# Patient Record
Sex: FEMALE | Race: WHITE | NOT HISPANIC OR LATINO | Employment: FULL TIME | URBAN - METROPOLITAN AREA
[De-identification: names, ages, dates, MRNs, and addresses within clinical notes are randomized per-mention and may not be internally consistent; named-entity substitution may affect disease eponyms.]

---

## 2017-04-13 ENCOUNTER — ALLSCRIPTS OFFICE VISIT (OUTPATIENT)
Dept: OTHER | Facility: OTHER | Age: 62
End: 2017-04-13

## 2017-04-21 ENCOUNTER — ALLSCRIPTS OFFICE VISIT (OUTPATIENT)
Dept: OTHER | Facility: OTHER | Age: 62
End: 2017-04-21

## 2017-04-28 ENCOUNTER — GENERIC CONVERSION - ENCOUNTER (OUTPATIENT)
Dept: OTHER | Facility: OTHER | Age: 62
End: 2017-04-28

## 2017-04-28 LAB
A/G RATIO (HISTORICAL): 1.6 (ref 1.2–2.2)
ALBUMIN SERPL BCP-MCNC: 4 G/DL (ref 3.5–5.5)
ALP SERPL-CCNC: 84 IU/L (ref 39–117)
ALT SERPL W P-5'-P-CCNC: 23 IU/L (ref 0–32)
AST SERPL W P-5'-P-CCNC: 22 IU/L (ref 0–40)
BILIRUB SERPL-MCNC: 0.5 MG/DL (ref 0–1.2)
BUN SERPL-MCNC: 12 MG/DL (ref 6–24)
BUN/CREA RATIO (HISTORICAL): 18 (ref 9–23)
CALCIUM SERPL-MCNC: 9 MG/DL (ref 8.7–10.2)
CHLORIDE SERPL-SCNC: 102 MMOL/L (ref 96–106)
CHOLEST SERPL-MCNC: 164 MG/DL (ref 100–199)
CHOLEST/HDLC SERPL: 2.4 RATIO UNITS (ref 0–4.4)
CO2 SERPL-SCNC: 24 MMOL/L (ref 18–29)
CREAT SERPL-MCNC: 0.67 MG/DL (ref 0.57–1)
EGFR AFRICAN AMERICAN (HISTORICAL): 111 ML/MIN/1.73
EGFR-AMERICAN CALC (HISTORICAL): 97 ML/MIN/1.73
GLUCOSE SERPL-MCNC: 83 MG/DL (ref 65–99)
HDLC SERPL-MCNC: 67 MG/DL
LDLC SERPL CALC-MCNC: 85 MG/DL (ref 0–99)
POTASSIUM SERPL-SCNC: 4.5 MMOL/L (ref 3.5–5.2)
SODIUM SERPL-SCNC: 142 MMOL/L (ref 134–144)
TOT. GLOBULIN, SERUM (HISTORICAL): 2.5 G/DL (ref 1.5–4.5)
TOTAL PROTEIN (HISTORICAL): 6.5 G/DL (ref 6–8.5)
TRIGL SERPL-MCNC: 62 MG/DL (ref 0–149)
VLDLC SERPL CALC-MCNC: 12 MG/DL (ref 5–40)

## 2017-04-29 LAB — INTERPRETATION (HISTORICAL): NORMAL

## 2017-04-30 ENCOUNTER — GENERIC CONVERSION - ENCOUNTER (OUTPATIENT)
Dept: OTHER | Facility: OTHER | Age: 62
End: 2017-04-30

## 2017-05-10 ENCOUNTER — GENERIC CONVERSION - ENCOUNTER (OUTPATIENT)
Dept: OTHER | Facility: OTHER | Age: 62
End: 2017-05-10

## 2017-12-19 ENCOUNTER — ALLSCRIPTS OFFICE VISIT (OUTPATIENT)
Dept: OTHER | Facility: OTHER | Age: 62
End: 2017-12-19

## 2018-01-13 VITALS
WEIGHT: 145 LBS | SYSTOLIC BLOOD PRESSURE: 106 MMHG | BODY MASS INDEX: 24.75 KG/M2 | DIASTOLIC BLOOD PRESSURE: 60 MMHG | HEIGHT: 64 IN | TEMPERATURE: 98 F

## 2018-01-13 NOTE — RESULT NOTES
Verified Results  (1) ACUTE HEPATITIS PANEL [id number] 07:14AM [facility]     Test Name Result Flag Reference   Hep A Ab, IgM Negative  Negative   HBsAg Screen Negative  Negative   Hep B Core Ab, IgM Negative  Negative   Hep C Virus Ab 0 2 s/co ratio  0 0-0 9   Negative:     < 0 8                                              Indeterminate: 0 8 - 0 9                                                   Positive:     > 0 9                  The [facility] recommends that a positive HCV antibody result                  be followed up with a HCV Nucleic Acid Amplification                  test ([id number])  (1) COMPREHENSIVE METABOLIC PANEL [id number] 06:93LF [facility]     Test Name Result Flag Reference   Glucose, Serum 96 mg/dL  65-99   BUN 9 mg/dL  6-24   Creatinine, Serum 0 78 mg/dL  0 57-1 00   eGFR If NonAfricn Am 83 mL/min/1 73  >59   eGFR If Africn Am 96 mL/min/1 73  >59   BUN/Creatinine Ratio 12  9-23   Sodium, Serum 140 mmol/L  134-144   Potassium, Serum 4 3 mmol/L  3 5-5 2   Chloride, Serum 102 mmol/L     Carbon Dioxide, Total 22 mmol/L  18-29   Calcium, Serum 9 6 mg/dL  8 7-10 2   Protein, Total, Serum 6 6 g/dL  6 0-8 5   Albumin, Serum 4 3 g/dL  3 5-5 5   Globulin, Total 2 3 g/dL  1 5-4 5   A/G Ratio 1 9  1 1-2 5   Bilirubin, Total 0 5 mg/dL  0 0-1 2   Alkaline Phosphatase, S 91 IU/L     AST (SGOT) 25 IU/L  0-40   ALT (SGPT) 21 IU/L  0-32     (1) LIPID PANEL FASTING W DIRECT LDL REFLEX [id number] 07:14AM [facility]     Test Name Result Flag Reference   Cholesterol, Total 147 mg/dL  100-199   Triglycerides 79 mg/dL  0-149   HDL Cholesterol 54 mg/dL  >39   According to ATP-III Guidelines, HDL-C >59 mg/dL is considered a  negative risk factor for CHD     LDL Cholesterol Calc 77 mg/dL  0-99     (1) CBC/ PLT (NO DIFF) [id number] 07:14AM [facility]     Test Name Result Flag Reference   WBC 3 8 x10E3/uL  3 4-10 8   RBC 4 31 x10E6/uL  3 77-5 28   Hemoglobin 13 5 g/dL  11 1-15 9   Hematocrit 40 8 % 34 0-46  6   MCV 95 fL  79-97   MCH 31 3 pg  26 6-33 0   MCHC 33 1 g/dL  31 5-35 7   RDW 13 3 %  12 3-15 4   Platelets 625 B48Y6/TRUMAN  150-379     Dinora Reesex CMP14 Default [id number] 07:14AM [facility]     Test Name Result Flag Reference   Everjose Keto CMP14 Default Comment     A hand-written panel/profile was received from your office  In  accordance with the [facility] Ambiguous Test Code Policy dated July 9295, we have completed your order by using the closest currently  or formerly recognized AMA panel  We have assigned Comprehensive  Metabolic Panel (14), Test Code #121603 to this request   If this  is not the testing you wished to receive on this specimen, please  contact the [address] [place] [address] Client Inquiry/Technical Services Department  to clarify the test order  We appreciate your business         Discussion/Summary   your labs were great! follow up as planned

## 2018-01-14 VITALS
SYSTOLIC BLOOD PRESSURE: 124 MMHG | BODY MASS INDEX: 24.75 KG/M2 | HEART RATE: 66 BPM | TEMPERATURE: 98.2 F | DIASTOLIC BLOOD PRESSURE: 78 MMHG | WEIGHT: 145 LBS | HEIGHT: 64 IN | RESPIRATION RATE: 14 BRPM

## 2018-01-18 NOTE — RESULT NOTES
Discussion/Summary   labs in normal range  Continue medications     Verified Results  (1) COMPREHENSIVE METABOLIC PANEL [id number] 11:07BU [facility]     Test Name Result Flag Reference   Glucose, Serum 83 mg/dL  65-99   BUN 12 mg/dL  6-24   Creatinine, Serum 0 67 mg/dL  0 57-1 00   BUN/Creatinine Ratio 18  9-23   Sodium, Serum 142 mmol/L  134-144   Potassium, Serum 4 5 mmol/L  3 5-5 2   Chloride, Serum 102 mmol/L     Carbon Dioxide, Total 24 mmol/L  18-29   Calcium, Serum 9 0 mg/dL  8 7-10 2   Protein, Total, Serum 6 5 g/dL  6 0-8 5   Albumin, Serum 4 0 g/dL  3 5-5 5   Globulin, Total 2 5 g/dL  1 5-4 5   A/G Ratio 1 6  1 2-2 2   Bilirubin, Total 0 5 mg/dL  0 0-1 2   Alkaline Phosphatase, S 84 IU/L     AST (SGOT) 22 IU/L  0-40   ALT (SGPT) 23 IU/L  0-32   eGFR If NonAfricn Am 97 mL/min/1 73  >59   eGFR If Africn Am 111 mL/min/1 73  >59     (1) LIPID PANEL, FASTING [id number] 07:16AM [facility]     Test Name Result Flag Reference   Cholesterol, Total 164 mg/dL  100-199   Triglycerides 62 mg/dL  0-149   HDL Cholesterol 67 mg/dL  >39   VLDL Cholesterol Carlos 12 mg/dL  5-40   LDL Cholesterol Calc 85 mg/dL  0-99   T  Chol/HDL Ratio 2 4 ratio units  0 0-4 4   T  Chol/HDL Ratio                                                             Men  Women                                               1/2 Avg  Risk  3 4    3 3                                                   Avg Risk  5 0    4 4                                                2X Avg  Risk  9 6    7 1                                                3X Avg  Risk 23 4   11 0     [facility]) Cardiovascular Risk Assessment [id number] 07:16AM [facility]     Test Name Result Flag Reference   Interpretation Note     Supplement report is available  PDF Image

## 2018-01-23 NOTE — MISCELLANEOUS
Message  Return to work or school:   Claudell Fire is under my professional care  She was seen in my office on 12/19/2017        SUSANNE Mccoy        Signatures   Electronically signed by : Harrison Smalls; Dec 19 2017  2:36PM EST                       (Author)    Electronically signed by : Rodri Graf DO; Dec 19 2017  8:56PM EST                       (Author)

## 2018-01-24 VITALS
HEART RATE: 72 BPM | DIASTOLIC BLOOD PRESSURE: 68 MMHG | HEIGHT: 64 IN | RESPIRATION RATE: 16 BRPM | BODY MASS INDEX: 26.46 KG/M2 | SYSTOLIC BLOOD PRESSURE: 120 MMHG | WEIGHT: 155 LBS | TEMPERATURE: 96.8 F

## 2018-02-16 DIAGNOSIS — J32.9 CHRONIC SINUSITIS, UNSPECIFIED LOCATION: Primary | ICD-10-CM

## 2018-02-16 RX ORDER — CHLORAL HYDRATE 500 MG
CAPSULE ORAL
COMMUNITY
End: 2018-04-22

## 2018-02-16 RX ORDER — CETIRIZINE HYDROCHLORIDE 10 MG/1
10 TABLET ORAL
COMMUNITY
End: 2022-07-28

## 2018-02-16 RX ORDER — FLUTICASONE PROPIONATE 50 MCG
SPRAY, SUSPENSION (ML) NASAL
COMMUNITY
Start: 2016-05-07 | End: 2018-02-16 | Stop reason: SDUPTHER

## 2018-02-16 RX ORDER — ESTRADIOL 0.1 MG/G
CREAM VAGINAL
COMMUNITY
Start: 2017-12-19

## 2018-02-16 RX ORDER — BIOTIN 1 MG
TABLET ORAL DAILY
COMMUNITY

## 2018-02-16 RX ORDER — FLUTICASONE PROPIONATE 50 MCG
1 SPRAY, SUSPENSION (ML) NASAL DAILY
Qty: 16 G | Refills: 1 | Status: SHIPPED | OUTPATIENT
Start: 2018-02-16 | End: 2018-04-27 | Stop reason: SDUPTHER

## 2018-02-16 RX ORDER — ATORVASTATIN CALCIUM 10 MG/1
10 TABLET, FILM COATED ORAL
COMMUNITY
End: 2018-04-13 | Stop reason: SDUPTHER

## 2018-04-13 DIAGNOSIS — E78.2 MIXED HYPERLIPIDEMIA: Primary | ICD-10-CM

## 2018-04-13 RX ORDER — ATORVASTATIN CALCIUM 10 MG/1
10 TABLET, FILM COATED ORAL DAILY
Qty: 30 TABLET | Refills: 0 | Status: SHIPPED | OUTPATIENT
Start: 2018-04-13 | End: 2018-04-27 | Stop reason: SDUPTHER

## 2018-04-13 NOTE — TELEPHONE ENCOUNTER
Left message for pt to call back  She needs an appt and we need to confirm if she will see Dr Domenic Joe in [place]

## 2018-04-13 NOTE — TELEPHONE ENCOUNTER
Maira left a message on the hotline requesting that Dr Kanu Kincaid refill her Atorvastatin 10 mg to [facility]   We need to clarify with her that she plans to continue care with Dr Gail Mansfield

## 2018-04-13 NOTE — TELEPHONE ENCOUNTER
Confirmed with pt that she will be staying at [facility]  Appt made with Dr Rashida Adams 4/27/18  Rx confirmed  No further action needed

## 2018-04-22 PROBLEM — E88.1 LIPODYSTROPHY: Status: ACTIVE | Noted: 2017-05-12

## 2018-04-27 ENCOUNTER — OFFICE VISIT (OUTPATIENT)
Dept: FAMILY MEDICINE CLINIC | Facility: CLINIC | Age: 63
End: 2018-04-27
Payer: COMMERCIAL

## 2018-04-27 VITALS
TEMPERATURE: 97.7 F | HEART RATE: 82 BPM | RESPIRATION RATE: 16 BRPM | HEIGHT: 64 IN | DIASTOLIC BLOOD PRESSURE: 70 MMHG | SYSTOLIC BLOOD PRESSURE: 118 MMHG | BODY MASS INDEX: 27.83 KG/M2 | WEIGHT: 163 LBS

## 2018-04-27 DIAGNOSIS — J30.2 SEASONAL ALLERGIC RHINITIS, UNSPECIFIED TRIGGER: ICD-10-CM

## 2018-04-27 DIAGNOSIS — J32.9 CHRONIC SINUSITIS, UNSPECIFIED LOCATION: ICD-10-CM

## 2018-04-27 DIAGNOSIS — M81.0 OSTEOPOROSIS, UNSPECIFIED OSTEOPOROSIS TYPE, UNSPECIFIED PATHOLOGICAL FRACTURE PRESENCE: ICD-10-CM

## 2018-04-27 DIAGNOSIS — E78.2 MIXED HYPERLIPIDEMIA: Primary | ICD-10-CM

## 2018-04-27 PROCEDURE — 3008F BODY MASS INDEX DOCD: CPT | Performed by: FAMILY MEDICINE

## 2018-04-27 PROCEDURE — 99214 OFFICE O/P EST MOD 30 MIN: CPT | Performed by: FAMILY MEDICINE

## 2018-04-27 RX ORDER — ATORVASTATIN CALCIUM 10 MG/1
10 TABLET, FILM COATED ORAL DAILY
Qty: 90 TABLET | Refills: 1 | Status: SHIPPED | OUTPATIENT
Start: 2018-04-27 | End: 2018-11-08 | Stop reason: SDUPTHER

## 2018-04-27 RX ORDER — ALENDRONATE SODIUM 70 MG/1
70 TABLET ORAL WEEKLY
COMMUNITY
Start: 2018-03-09 | End: 2018-12-10 | Stop reason: SINTOL

## 2018-04-27 RX ORDER — FLUTICASONE PROPIONATE 50 MCG
1 SPRAY, SUSPENSION (ML) NASAL DAILY
Qty: 48 G | Refills: 1 | Status: SHIPPED | OUTPATIENT
Start: 2018-04-27 | End: 2019-10-04 | Stop reason: SDUPTHER

## 2018-04-27 NOTE — PATIENT INSTRUCTIONS
SHINGRIX is the new, more effective vaccine for Shingles  It is more than 90% effective  You should check with your local pharmacy and insurance company for availability and coverage  It is a 2 shot series, with the second shot given between 2-6 months after the first, and is approved for ages 48 and up

## 2018-04-27 NOTE — PROGRESS NOTES
Assessment/Plan:    No problem-specific Assessment & Plan notes found for this encounter  Allergies stable, cont flonase  Chol advised, importance of q6m f/u and labs  Use of statins for the purposes of CVD/CVA risks reduction was advised according to USPSTF guidelines along with appropriate continued liver monitoring  bmi advised and wt loss  Diet/exercise/weight loss is recommended  Keep rheum f/u  Osteoporosis new, tolerating fosamax, told to get dexa in 2y     Diagnoses and all orders for this visit:    Seasonal allergic rhinitis, unspecified trigger    Mixed hyperlipidemia  -     atorvastatin (LIPITOR) 10 mg tablet; Take 1 tablet (10 mg total) by mouth daily  -     Lipid Panel with Direct LDL reflex; Future    BMI 27 0-27 9,adult    Chronic sinusitis, unspecified location  -     fluticasone (FLONASE) 50 mcg/act nasal spray; 1 spray into each nostril daily    Osteoporosis, unspecified osteoporosis type, unspecified pathological fracture presence              Return in about 6 months (around 10/27/2018) for Recheck  Subjective:      Patient ID: Bairon Martinez is a 63 y o  female      Chief Complaint   Patient presents with    Follow-up     medications        HPI  Seeing rheumatologist, did labs, in [facility]  Also sees gyn  Had left leg pain  dexa showed osteoporosis, started on fosamax    Mid march had labs at [address] [facility] specialist and  normal cmp/tsh/D  On 1000u/d of vitamin D, was on 2000u and told to reduce      Takes lipitor  Not on low fat diet   Exercises  LFD and lab monitoring q6m advised    Allergies better on flonase, no nose bleeds  Zyrtec helps also  Spring and fall  Been congested  No f/c/discolored mucus    Quit tob 10y ago  Refuses CT lung screen      The following portions of the patient's history were reviewed and updated as appropriate: allergies, current medications, past family history, past medical history, past social history, past surgical history and problem list     Review of Systems Constitutional: Negative for fever  Respiratory: Negative for shortness of breath and wheezing  Musculoskeletal: Positive for arthralgias  Negative for myalgias  Current Outpatient Prescriptions   Medication Sig Dispense Refill    alendronate (FOSAMAX) 70 mg tablet Take 70 mg by mouth Once a week      atorvastatin (LIPITOR) 10 mg tablet Take 1 tablet (10 mg total) by mouth daily 90 tablet 1    cetirizine (ZyrTEC) 10 mg tablet Take 10 mg by mouth      Cholecalciferol (VITAMIN D3) 1000 units CAPS Take by mouth 2 (two) times a day      estradiol (ESTRACE VAGINAL) 0 1 mg/g vaginal cream Insert into the vagina      fluticasone (FLONASE) 50 mcg/act nasal spray 1 spray into each nostril daily 48 g 1    Omega-3 Fatty Acids (FISH OIL PO) Take 1,000 mg by mouth      CALCIUM CITRATE-VITAMIN D3 PO Take by mouth       No current facility-administered medications for this visit  Objective:    /70   Pulse 82   Temp 97 7 °F (36 5 °C)   Resp 16   Ht 5' 4" (1 626 m)   Wt 73 9 kg (163 lb)   BMI 27 98 kg/m²        Physical Exam   Constitutional: She appears well-developed  HENT:   Head: Normocephalic  Eyes: Conjunctivae are normal    Neck: Neck supple  Cardiovascular: Normal rate and intact distal pulses  Pulmonary/Chest: Effort normal  No respiratory distress  Abdominal: Soft  Musculoskeletal: She exhibits no edema or deformity  Neurological: She is alert  Skin: Skin is warm and dry  Psychiatric: Her behavior is normal  Thought content normal    Nursing note and vitals reviewed               Ebony Espino DO

## 2018-07-07 LAB
CHOLEST SERPL-MCNC: 185 MG/DL (ref 100–199)
HDLC SERPL-MCNC: 74 MG/DL
LDLC SERPL CALC-MCNC: 95 MG/DL (ref 0–99)
MICRODELETION SYND BLD/T FISH: NORMAL
TRIGL SERPL-MCNC: 79 MG/DL (ref 0–149)

## 2018-11-08 DIAGNOSIS — E78.2 MIXED HYPERLIPIDEMIA: ICD-10-CM

## 2018-11-08 RX ORDER — ATORVASTATIN CALCIUM 10 MG/1
10 TABLET, FILM COATED ORAL DAILY
Qty: 30 TABLET | Refills: 0 | Status: SHIPPED | OUTPATIENT
Start: 2018-11-08 | End: 2018-12-10 | Stop reason: SDUPTHER

## 2018-11-09 NOTE — TELEPHONE ENCOUNTER
Appointment made for 12/10/18 with Dr Courtney Govea   No further action is required  Milena Altman MA

## 2018-12-06 NOTE — PROGRESS NOTES
Subjective:      Patient ID: Lenard Allen is a 66 y o  female  Chief Complaint   Patient presents with    Follow-up     medication use-lj       She is here for follow up of hyperlipidemia and osteoporosis  Had DEXA by gyn showing osteoporosis  He sent her to rheum at [facility], who put her on fosamax and discharged her  She has been having problems with her teeth and was told by her dentist that she has to be off the fosamax for at least 3 months in order to treat her tooth, and that he did not think this medication is good for her  She stopped the fosamax and does not want anything else at this point for osteoporosis despite the risk of fracture  Is due for another DEXA in 2 years  Does exercise and gets adequate calcium and vitamin D daily  The following portions of the patient's history were reviewed and updated as appropriate: allergies, current medications, past family history, past medical history, past social history, past surgical history and problem list     Review of Systems   Constitutional: Negative  Respiratory: Negative  Cardiovascular: Negative  Current Outpatient Prescriptions   Medication Sig Dispense Refill    atorvastatin (LIPITOR) 10 mg tablet Take 1 tablet (10 mg total) by mouth daily 90 tablet 3    cetirizine (ZyrTEC) 10 mg tablet Take 10 mg by mouth      Cholecalciferol (VITAMIN D3) 1000 units CAPS Take by mouth 2 (two) times a day      estradiol (ESTRACE VAGINAL) 0 1 mg/g vaginal cream Insert into the vagina      fluticasone (FLONASE) 50 mcg/act nasal spray 1 spray into each nostril daily 48 g 1    Omega-3 Fatty Acids (FISH OIL PO) Take 1,000 mg by mouth       No current facility-administered medications for this visit  Objective:    /72   Pulse 68   Temp (!) 97 4 °F (36 3 °C)   Resp 16   Ht 5' 4" (1 626 m)   Wt 75 8 kg (167 lb)   Breastfeeding?  No   BMI 28 67 kg/m²        Physical Exam   Constitutional: She appears well-developed and well-nourished  Eyes: Conjunctivae are normal    Neck: Neck supple  No JVD present  No thyromegaly present  Cardiovascular: Normal rate, regular rhythm, normal heart sounds and intact distal pulses  Exam reveals no gallop and no friction rub  No murmur heard  Pulmonary/Chest: Effort normal and breath sounds normal  She has no wheezes  She has no rales  Abdominal: Soft  Bowel sounds are normal  She exhibits no distension  There is no tenderness  Musculoskeletal: She exhibits no edema  Assessment/Plan:    Osteoporosis  She will continue calcium and vitamin D and will recheck DEXA in 2 years  She refuses any alternate medication  Mixed hyperlipidemia  She is due for bloodwork on atorvastatin and this was added  Advised on low fat diet exercise, weight loss  BMI 28 0-28 9,adult  Advised again on low fat diet, exercise, weight loss  Diagnoses and all orders for this visit:    Osteoporosis, unspecified osteoporosis type, unspecified pathological fracture presence    Mixed hyperlipidemia  -     atorvastatin (LIPITOR) 10 mg tablet; Take 1 tablet (10 mg total) by mouth daily  -     CBC and differential; Future  -     Comprehensive metabolic panel; Future  -     Lipid panel; Future  -     CBC and differential  -     Comprehensive metabolic panel  -     Lipid panel    BMI 28 0-28 9,adult          Return in about 6 months (around 6/10/2019)         Goldy Baum MD

## 2018-12-10 ENCOUNTER — OFFICE VISIT (OUTPATIENT)
Dept: FAMILY MEDICINE CLINIC | Facility: CLINIC | Age: 63
End: 2018-12-10
Payer: COMMERCIAL

## 2018-12-10 VITALS
DIASTOLIC BLOOD PRESSURE: 72 MMHG | SYSTOLIC BLOOD PRESSURE: 124 MMHG | BODY MASS INDEX: 28.51 KG/M2 | HEIGHT: 64 IN | WEIGHT: 167 LBS | HEART RATE: 68 BPM | TEMPERATURE: 97.4 F | RESPIRATION RATE: 16 BRPM

## 2018-12-10 DIAGNOSIS — E78.2 MIXED HYPERLIPIDEMIA: ICD-10-CM

## 2018-12-10 DIAGNOSIS — M81.0 OSTEOPOROSIS, UNSPECIFIED OSTEOPOROSIS TYPE, UNSPECIFIED PATHOLOGICAL FRACTURE PRESENCE: Primary | ICD-10-CM

## 2018-12-10 PROCEDURE — 99213 OFFICE O/P EST LOW 20 MIN: CPT | Performed by: INTERNAL MEDICINE

## 2018-12-10 PROCEDURE — 3008F BODY MASS INDEX DOCD: CPT | Performed by: INTERNAL MEDICINE

## 2018-12-10 RX ORDER — ATORVASTATIN CALCIUM 10 MG/1
10 TABLET, FILM COATED ORAL DAILY
Qty: 90 TABLET | Refills: 3 | Status: SHIPPED | OUTPATIENT
Start: 2018-12-10 | End: 2018-12-19 | Stop reason: SDUPTHER

## 2018-12-10 NOTE — ASSESSMENT & PLAN NOTE
She will continue calcium and vitamin D and will recheck DEXA in 2 years  She refuses any alternate medication

## 2018-12-10 NOTE — ASSESSMENT & PLAN NOTE
She is due for bloodwork on atorvastatin and this was added  Advised on low fat diet exercise, weight loss

## 2018-12-19 DIAGNOSIS — E78.2 MIXED HYPERLIPIDEMIA: ICD-10-CM

## 2018-12-19 RX ORDER — ATORVASTATIN CALCIUM 10 MG/1
10 TABLET, FILM COATED ORAL DAILY
Qty: 90 TABLET | Refills: 3 | Status: SHIPPED | OUTPATIENT
Start: 2018-12-19 | End: 2019-06-10 | Stop reason: SDUPTHER

## 2019-01-02 ENCOUNTER — OFFICE VISIT (OUTPATIENT)
Dept: FAMILY MEDICINE CLINIC | Facility: CLINIC | Age: 64
End: 2019-01-02
Payer: COMMERCIAL

## 2019-01-02 VITALS
DIASTOLIC BLOOD PRESSURE: 82 MMHG | SYSTOLIC BLOOD PRESSURE: 130 MMHG | BODY MASS INDEX: 28.68 KG/M2 | RESPIRATION RATE: 16 BRPM | HEIGHT: 64 IN | TEMPERATURE: 97.4 F | HEART RATE: 84 BPM | WEIGHT: 168 LBS

## 2019-01-02 DIAGNOSIS — J06.9 VIRAL UPPER RESPIRATORY TRACT INFECTION: Primary | ICD-10-CM

## 2019-01-02 DIAGNOSIS — M77.12 LATERAL EPICONDYLITIS OF LEFT ELBOW: ICD-10-CM

## 2019-01-02 PROCEDURE — 3008F BODY MASS INDEX DOCD: CPT | Performed by: INTERNAL MEDICINE

## 2019-01-02 PROCEDURE — 99213 OFFICE O/P EST LOW 20 MIN: CPT | Performed by: INTERNAL MEDICINE

## 2019-01-02 PROCEDURE — 1036F TOBACCO NON-USER: CPT | Performed by: INTERNAL MEDICINE

## 2019-01-02 RX ORDER — DEXTROMETHORPHAN HYDROBROMIDE AND PROMETHAZINE HYDROCHLORIDE 15; 6.25 MG/5ML; MG/5ML
5 SYRUP ORAL 4 TIMES DAILY PRN
Qty: 118 ML | Refills: 0 | Status: SHIPPED | OUTPATIENT
Start: 2019-01-02 | End: 2019-06-10 | Stop reason: ALTCHOICE
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
## 2019-01-02 NOTE — LETTER
January 2, 2019     Patient: Tosin Esteves   YOB: 1955   Date of Visit: 1/2/2019       To Whom it May Concern:    Aris Bennett is under my professional care  She was seen in my office on 1/2/2019  She is excused due to illness 1/2 and 1/3/19  If you have any questions or concerns, please don't hesitate to call           Sincerely,          Reed Pinto MD        CC: No Recipients

## 2019-02-07 LAB
ALBUMIN SERPL-MCNC: 4.4 G/DL (ref 3.6–4.8)
ALBUMIN/GLOB SERPL: 1.8 {RATIO} (ref 1.2–2.2)
ALP SERPL-CCNC: 85 IU/L (ref 39–117)
ALT SERPL-CCNC: 20 IU/L (ref 0–32)
AST SERPL-CCNC: 20 IU/L (ref 0–40)
BASOPHILS # BLD AUTO: 0.1 X10E3/UL (ref 0–0.2)
BASOPHILS NFR BLD AUTO: 1 %
BILIRUB SERPL-MCNC: 0.3 MG/DL (ref 0–1.2)
BUN SERPL-MCNC: 16 MG/DL (ref 8–27)
BUN/CREAT SERPL: 20 (ref 12–28)
CALCIUM SERPL-MCNC: 9.5 MG/DL (ref 8.7–10.3)
CHLORIDE SERPL-SCNC: 104 MMOL/L (ref 96–106)
CHOLEST SERPL-MCNC: 167 MG/DL (ref 100–199)
CHOLEST/HDLC SERPL: 2.9 RATIO (ref 0–4.4)
CO2 SERPL-SCNC: 26 MMOL/L (ref 20–29)
CREAT SERPL-MCNC: 0.81 MG/DL (ref 0.57–1)
EOSINOPHIL # BLD AUTO: 0.5 X10E3/UL (ref 0–0.4)
EOSINOPHIL NFR BLD AUTO: 10 %
ERYTHROCYTE [DISTWIDTH] IN BLOOD BY AUTOMATED COUNT: 13.1 % (ref 12.3–15.4)
GLOBULIN SER-MCNC: 2.4 G/DL (ref 1.5–4.5)
GLUCOSE SERPL-MCNC: 99 MG/DL (ref 65–99)
HCT VFR BLD AUTO: 39 % (ref 34–46.6)
HDLC SERPL-MCNC: 58 MG/DL
HGB BLD-MCNC: 13.4 G/DL (ref 11.1–15.9)
IMM GRANULOCYTES # BLD: 0 X10E3/UL (ref 0–0.1)
IMM GRANULOCYTES NFR BLD: 0 %
LDLC SERPL CALC-MCNC: 92 MG/DL (ref 0–99)
LYMPHOCYTES # BLD AUTO: 1.6 X10E3/UL (ref 0.7–3.1)
LYMPHOCYTES NFR BLD AUTO: 32 %
MCH RBC QN AUTO: 32.4 PG (ref 26.6–33)
MCHC RBC AUTO-ENTMCNC: 34.4 G/DL (ref 31.5–35.7)
MCV RBC AUTO: 94 FL (ref 79–97)
MICRODELETION SYND BLD/T FISH: NORMAL
MONOCYTES # BLD AUTO: 0.5 X10E3/UL (ref 0.1–0.9)
MONOCYTES NFR BLD AUTO: 9 %
NEUTROPHILS # BLD AUTO: 2.4 X10E3/UL (ref 1.4–7)
NEUTROPHILS NFR BLD AUTO: 48 %
PLATELET # BLD AUTO: 230 X10E3/UL (ref 150–379)
POTASSIUM SERPL-SCNC: 4.5 MMOL/L (ref 3.5–5.2)
PROT SERPL-MCNC: 6.8 G/DL (ref 6–8.5)
RBC # BLD AUTO: 4.14 X10E6/UL (ref 3.77–5.28)
SL AMB EGFR AFRICAN AMERICAN: 91 ML/MIN/1.73
SL AMB EGFR NON AFRICAN AMERICAN: 79 ML/MIN/1.73
SL AMB VLDL CHOLESTEROL CALC: 17 MG/DL (ref 5–40)
SODIUM SERPL-SCNC: 143 MMOL/L (ref 134–144)
TRIGL SERPL-MCNC: 87 MG/DL (ref 0–149)
WBC # BLD AUTO: 5 X10E3/UL (ref 3.4–10.8)

## 2019-02-11 ENCOUNTER — TELEPHONE (OUTPATIENT)
Dept: FAMILY MEDICINE CLINIC | Facility: CLINIC | Age: 64
End: 2019-02-11

## 2019-02-11 NOTE — TELEPHONE ENCOUNTER
Patient aware    Thinks she may need more testing if level does not come down  Upset that count is off, "count should not be off" tried to explain this is a type of wbc and its not elevated much but said she should not chronically be sick with nasal congestion  Believes its from a infection  Encouraged patient to make appt if sick  Will only make appointment if continues 
Pt left a message on results hotline for lab work she had done last Friday  Pt states she was looking in [facility] and noticed she had an abnormal reading for EOS absolute  She wants to speak with Dr Hemal Cueva about this   [place], [place]
These are types of WBC's that can be elevated in allergy and the elevation is very mild 
Alert and oriented to person, place, time/situation. normal mood and affect. no apparent risk to self or others.

## 2019-06-10 ENCOUNTER — OFFICE VISIT (OUTPATIENT)
Dept: FAMILY MEDICINE CLINIC | Facility: CLINIC | Age: 64
End: 2019-06-10
Payer: COMMERCIAL

## 2019-06-10 VITALS
WEIGHT: 159 LBS | RESPIRATION RATE: 16 BRPM | DIASTOLIC BLOOD PRESSURE: 78 MMHG | SYSTOLIC BLOOD PRESSURE: 122 MMHG | HEART RATE: 76 BPM | BODY MASS INDEX: 27.14 KG/M2 | TEMPERATURE: 97.9 F | HEIGHT: 64 IN

## 2019-06-10 DIAGNOSIS — R19.7 DIARRHEA, UNSPECIFIED TYPE: ICD-10-CM

## 2019-06-10 DIAGNOSIS — E78.2 MIXED HYPERLIPIDEMIA: Primary | ICD-10-CM

## 2019-06-10 PROCEDURE — 3008F BODY MASS INDEX DOCD: CPT | Performed by: INTERNAL MEDICINE

## 2019-06-10 PROCEDURE — 99213 OFFICE O/P EST LOW 20 MIN: CPT | Performed by: INTERNAL MEDICINE

## 2019-06-10 PROCEDURE — 1036F TOBACCO NON-USER: CPT | Performed by: INTERNAL MEDICINE

## 2019-06-10 RX ORDER — ATORVASTATIN CALCIUM 10 MG/1
10 TABLET, FILM COATED ORAL DAILY
Qty: 90 TABLET | Refills: 3 | Status: SHIPPED | OUTPATIENT
Start: 2019-06-10 | End: 2019-12-12 | Stop reason: SDUPTHER

## 2019-06-10 RX ORDER — AMOXICILLIN 500 MG
1000 CAPSULE ORAL
COMMUNITY
Start: 2010-03-01 | End: 2019-06-10 | Stop reason: ALTCHOICE

## 2019-10-04 ENCOUNTER — OFFICE VISIT (OUTPATIENT)
Dept: FAMILY MEDICINE CLINIC | Facility: CLINIC | Age: 64
End: 2019-10-04
Payer: COMMERCIAL

## 2019-10-04 VITALS
HEART RATE: 72 BPM | HEIGHT: 64 IN | WEIGHT: 163 LBS | SYSTOLIC BLOOD PRESSURE: 110 MMHG | TEMPERATURE: 98.6 F | RESPIRATION RATE: 18 BRPM | DIASTOLIC BLOOD PRESSURE: 74 MMHG | BODY MASS INDEX: 27.83 KG/M2

## 2019-10-04 DIAGNOSIS — J32.9 CHRONIC SINUSITIS, UNSPECIFIED LOCATION: ICD-10-CM

## 2019-10-04 DIAGNOSIS — J06.9 UPPER RESPIRATORY TRACT INFECTION, UNSPECIFIED TYPE: Primary | ICD-10-CM

## 2019-10-04 PROCEDURE — 3008F BODY MASS INDEX DOCD: CPT | Performed by: FAMILY MEDICINE

## 2019-10-04 PROCEDURE — 99213 OFFICE O/P EST LOW 20 MIN: CPT | Performed by: FAMILY MEDICINE

## 2019-10-04 RX ORDER — AZITHROMYCIN 250 MG/1
TABLET, FILM COATED ORAL
Qty: 6 TABLET | Refills: 0 | Status: SHIPPED | OUTPATIENT
Start: 2019-10-04 | End: 2019-10-09

## 2019-10-04 RX ORDER — FLUTICASONE PROPIONATE 50 MCG
1 SPRAY, SUSPENSION (ML) NASAL DAILY
Qty: 48 G | Refills: 1 | Status: SHIPPED | OUTPATIENT
Start: 2019-10-04 | End: 2020-08-03 | Stop reason: SDUPTHER

## 2019-10-04 NOTE — PROGRESS NOTES
Assessment/Plan:     Diagnoses and all orders for this visit:    Upper respiratory tract infection, unspecified type  -     azithromycin (ZITHROMAX) 250 mg tablet; Take 2 tablets (500 mg total) by mouth daily for 1 day, THEN 1 tablet (250 mg total) daily for 4 days if symptoms worsen or do not improve  -     fluticasone (FLONASE) 50 mcg/act nasal spray; 1 spray into each nostril daily  - Reviewed supportive care with patient including rest, staying hydrated (drink 8-10 glasses of liquids such as water, ginger ale, juice), salt water gargles, saline nasal drops, use of humidifier, frequent handwashing to prevent spread of germs, and use of over the counter decongestants, cough suppressants, Ibuprofen/Acetaminophen        Subjective:      Patient ID: Liliam Earl is a 60 y o  female  URI    This is a new problem  The current episode started in the past 7 days  The problem has been unchanged  There has been no fever  Associated symptoms include congestion, ear pain, a plugged ear sensation, sinus pain, a sore throat and swollen glands  Pertinent negatives include no abdominal pain, chest pain, coughing, diarrhea, dysuria, headaches, joint pain, joint swelling, nausea, neck pain, rash, rhinorrhea, sneezing, vomiting or wheezing  She has tried increased fluids for the symptoms  The treatment provided mild relief  The following portions of the patient's history were reviewed and updated as appropriate: allergies, current medications, past family history, past medical history, past social history, past surgical history and problem list     Review of Systems   HENT: Positive for congestion, ear pain, sinus pain and sore throat  Negative for rhinorrhea and sneezing  Respiratory: Negative for cough and wheezing  Cardiovascular: Negative for chest pain  Gastrointestinal: Negative for abdominal pain, diarrhea, nausea and vomiting  Genitourinary: Negative for dysuria     Musculoskeletal: Negative for joint pain and neck pain  Skin: Negative for rash  Neurological: Negative for headaches  Objective:      /74   Pulse 72   Temp 98 6 °F (37 °C)   Resp 18   Ht 5' 4" (1 626 m)   Wt 73 9 kg (163 lb)   BMI 27 98 kg/m²          Physical Exam   Constitutional: She is oriented to person, place, and time  She appears well-developed and well-nourished  No distress  HENT:   Head: Normocephalic and atraumatic  Right Ear: External ear normal    Left Ear: External ear normal    Nose: Nose normal    Mouth/Throat: Oropharynx is clear and moist  No oropharyngeal exudate  Eyes: Pupils are equal, round, and reactive to light  Conjunctivae and EOM are normal  Right eye exhibits no discharge  Left eye exhibits no discharge  No scleral icterus  Neck: Normal range of motion  Neck supple  No JVD present  No tracheal deviation present  No thyromegaly present  Cardiovascular: Normal rate, regular rhythm, normal heart sounds and intact distal pulses  Exam reveals no gallop and no friction rub  No murmur heard  Pulmonary/Chest: Effort normal and breath sounds normal  No respiratory distress  She has no wheezes  She has no rales  She exhibits no tenderness  Musculoskeletal: Normal range of motion  She exhibits no edema or deformity  Neurological: She is alert and oriented to person, place, and time  Skin: Skin is warm and dry  She is not diaphoretic  Psychiatric: She has a normal mood and affect   Her behavior is normal  Judgment and thought content normal

## 2019-11-27 LAB
ALBUMIN SERPL-MCNC: 4.4 G/DL (ref 3.6–4.8)
ALBUMIN/GLOB SERPL: 1.8 {RATIO} (ref 1.2–2.2)
ALP SERPL-CCNC: 89 IU/L (ref 39–117)
ALT SERPL-CCNC: 29 IU/L (ref 0–32)
AST SERPL-CCNC: 25 IU/L (ref 0–40)
BILIRUB SERPL-MCNC: 0.6 MG/DL (ref 0–1.2)
BUN SERPL-MCNC: 13 MG/DL (ref 8–27)
BUN/CREAT SERPL: 16 (ref 12–28)
CALCIUM SERPL-MCNC: 9.4 MG/DL (ref 8.7–10.3)
CHLORIDE SERPL-SCNC: 102 MMOL/L (ref 96–106)
CHOLEST SERPL-MCNC: 189 MG/DL (ref 100–199)
CHOLEST/HDLC SERPL: 2.7 RATIO (ref 0–4.4)
CO2 SERPL-SCNC: 24 MMOL/L (ref 20–29)
CREAT SERPL-MCNC: 0.79 MG/DL (ref 0.57–1)
ENDOMYSIUM IGA SER QL: NEGATIVE
GLOBULIN SER-MCNC: 2.4 G/DL (ref 1.5–4.5)
GLUCOSE SERPL-MCNC: 96 MG/DL (ref 65–99)
HDLC SERPL-MCNC: 69 MG/DL
IGA SERPL-MCNC: 176 MG/DL (ref 87–352)
LDLC SERPL CALC-MCNC: 97 MG/DL (ref 0–99)
MICRODELETION SYND BLD/T FISH: NORMAL
POTASSIUM SERPL-SCNC: 4.3 MMOL/L (ref 3.5–5.2)
PROT SERPL-MCNC: 6.8 G/DL (ref 6–8.5)
SL AMB EGFR AFRICAN AMERICAN: 93 ML/MIN/1.73
SL AMB EGFR NON AFRICAN AMERICAN: 80 ML/MIN/1.73
SL AMB VLDL CHOLESTEROL CALC: 23 MG/DL (ref 5–40)
SODIUM SERPL-SCNC: 140 MMOL/L (ref 134–144)
TRIGL SERPL-MCNC: 115 MG/DL (ref 0–149)
TTG IGA SER-ACNC: <2 U/ML (ref 0–3)

## 2019-12-09 NOTE — PROGRESS NOTES
Assessment/Plan:    1  Mixed hyperlipidemia  Assessment & Plan: This is well controlled on atorvastatin, reviewed labs with patient  Continue low fat diet  Orders:  -     atorvastatin (LIPITOR) 10 mg tablet; Take 1 tablet (10 mg total) by mouth daily  -     Comprehensive metabolic panel; Future  -     Lipid panel; Future  -     influenza vaccine, [id number], quadrivalent, recombinant, PF, 0 5 mL, for patients 18 yr+ (FLUBLOK)  -     Comprehensive metabolic panel  -     Lipid panel    2  Nevus  Comments:  Referred to dermatology for a skin check  Orders:  -     Ambulatory referral to Dermatology; Future    3  Need for vaccination  -     influenza vaccine, [id number], quadrivalent, recombinant, PF, 0 5 mL, for patients 18 yr+ (FLUBLOK)          There are no Patient Instructions on file for this visit  Return in about 6 months (around 6/12/2020)  Subjective:      Patient ID: Magen Olsen is a 60 y o  female  Chief Complaint   Patient presents with    Follow-up     6 month f/u-wmcma       Here for follow up of hyperlipidemia  Feels well and has no side effects on the atorvastatin  Trying to follow a low fat diet  She has a mole that she is a little concerned about  It has been there for years and has not changed, but would like it checked  The following portions of the patient's history were reviewed and updated as appropriate: allergies, current medications, past family history, past medical history, past social history, past surgical history and problem list     Review of Systems   Constitutional: Negative  Respiratory: Negative  Cardiovascular: Negative  Skin:        As per HPI           Current Outpatient Medications   Medication Sig Dispense Refill    Ascorbic Acid (VITAMIN C) 100 MG tablet Take 100 mg by mouth daily      atorvastatin (LIPITOR) 10 mg tablet Take 1 tablet (10 mg total) by mouth daily 90 tablet 3    cetirizine (ZyrTEC) 10 mg tablet Take 10 mg by mouth      Cholecalciferol (VITAMIN D3) 1000 units CAPS Take by mouth 2 (two) times a day      estradiol (ESTRACE VAGINAL) 0 1 mg/g vaginal cream Insert into the vagina      fluticasone (FLONASE) 50 mcg/act nasal spray 1 spray into each nostril daily 48 g 1    Multiple Vitamins-Iron (MULTIVITAMIN/IRON PO) one daily      Omega-3 Fatty Acids (FISH OIL PO) Take 1,000 mg by mouth       No current facility-administered medications for this visit  Objective:    /80   Pulse 66   Temp (!) 96 7 °F (35 9 °C)   Resp 16   Ht 5' 4" (1 626 m)   Wt 74 4 kg (164 lb)   SpO2 96%   BMI 28 15 kg/m²        Physical Exam   Constitutional: She appears well-developed and well-nourished  Eyes: Conjunctivae are normal    Neck: Neck supple  No JVD present  No thyromegaly present  Cardiovascular: Normal rate, regular rhythm, normal heart sounds and intact distal pulses  Exam reveals no gallop and no friction rub  No murmur heard  Pulmonary/Chest: Effort normal and breath sounds normal  She has no wheezes  She has no rales  Abdominal: Soft  Bowel sounds are normal  She exhibits no distension  There is no tenderness  Musculoskeletal: She exhibits no edema     Skin:   Benign appearing nevus R upper chest              Poonam Burton MD

## 2019-12-12 ENCOUNTER — OFFICE VISIT (OUTPATIENT)
Dept: FAMILY MEDICINE CLINIC | Facility: CLINIC | Age: 64
End: 2019-12-12
Payer: COMMERCIAL

## 2019-12-12 VITALS
TEMPERATURE: 96.7 F | WEIGHT: 164 LBS | RESPIRATION RATE: 16 BRPM | HEART RATE: 66 BPM | OXYGEN SATURATION: 96 % | HEIGHT: 64 IN | SYSTOLIC BLOOD PRESSURE: 120 MMHG | BODY MASS INDEX: 28 KG/M2 | DIASTOLIC BLOOD PRESSURE: 80 MMHG

## 2019-12-12 DIAGNOSIS — D22.9 NEVUS: ICD-10-CM

## 2019-12-12 DIAGNOSIS — Z23 NEED FOR VACCINATION: ICD-10-CM

## 2019-12-12 DIAGNOSIS — E78.2 MIXED HYPERLIPIDEMIA: Primary | ICD-10-CM

## 2019-12-12 PROCEDURE — 90471 IMMUNIZATION ADMIN: CPT

## 2019-12-12 PROCEDURE — 90682 RIV4 VACC RECOMBINANT DNA IM: CPT

## 2019-12-12 PROCEDURE — 1036F TOBACCO NON-USER: CPT

## 2019-12-12 PROCEDURE — 99213 OFFICE O/P EST LOW 20 MIN: CPT

## 2019-12-12 PROCEDURE — 3008F BODY MASS INDEX DOCD: CPT

## 2019-12-12 RX ORDER — RIBOFLAVIN (VITAMIN B2) 100 MG
100 TABLET ORAL DAILY
COMMUNITY

## 2019-12-12 RX ORDER — ATORVASTATIN CALCIUM 10 MG/1
10 TABLET, FILM COATED ORAL DAILY
Qty: 90 TABLET | Refills: 3 | Status: SHIPPED | OUTPATIENT
Start: 2019-12-12 | End: 2020-09-19

## 2020-04-01 ENCOUNTER — TELEMEDICINE (OUTPATIENT)
Dept: FAMILY MEDICINE CLINIC | Facility: CLINIC | Age: 65
End: 2020-04-01
Payer: COMMERCIAL

## 2020-04-01 DIAGNOSIS — Z20.822 EXPOSURE TO COVID-19 VIRUS: Primary | ICD-10-CM

## 2020-04-01 PROBLEM — N39.3 STRESS INCONTINENCE IN FEMALE: Status: ACTIVE | Noted: 2020-02-22

## 2020-04-01 PROCEDURE — 99213 OFFICE O/P EST LOW 20 MIN: CPT | Performed by: INTERNAL MEDICINE

## 2020-08-03 ENCOUNTER — TELEMEDICINE (OUTPATIENT)
Dept: FAMILY MEDICINE CLINIC | Facility: CLINIC | Age: 65
End: 2020-08-03
Payer: COMMERCIAL

## 2020-08-03 DIAGNOSIS — J32.9 CHRONIC SINUSITIS, UNSPECIFIED LOCATION: ICD-10-CM

## 2020-08-03 DIAGNOSIS — Z20.828 EXPOSURE TO SARS-ASSOCIATED CORONAVIRUS: ICD-10-CM

## 2020-08-03 DIAGNOSIS — R19.7 DIARRHEA, UNSPECIFIED TYPE: Primary | ICD-10-CM

## 2020-08-03 DIAGNOSIS — J02.9 SORE THROAT: ICD-10-CM

## 2020-08-03 PROCEDURE — U0003 INFECTIOUS AGENT DETECTION BY NUCLEIC ACID (DNA OR RNA); SEVERE ACUTE RESPIRATORY SYNDROME CORONAVIRUS 2 (SARS-COV-2) (CORONAVIRUS DISEASE [COVID-19]), AMPLIFIED PROBE TECHNIQUE, MAKING USE OF HIGH THROUGHPUT TECHNOLOGIES AS DESCRIBED BY CMS-2020-01-R: HCPCS | Performed by: NURSE PRACTITIONER

## 2020-08-03 PROCEDURE — 1036F TOBACCO NON-USER: CPT | Performed by: NURSE PRACTITIONER

## 2020-08-03 PROCEDURE — 99213 OFFICE O/P EST LOW 20 MIN: CPT | Performed by: NURSE PRACTITIONER

## 2020-08-03 RX ORDER — FLUTICASONE PROPIONATE 50 MCG
1 SPRAY, SUSPENSION (ML) NASAL DAILY
Qty: 48 G | Refills: 1 | Status: SHIPPED | OUTPATIENT
Start: 2020-08-03 | End: 2021-06-09

## 2020-08-03 NOTE — PROGRESS NOTES
Virtual Regular Visit      Assessment/Plan:    Problem List Items Addressed This Visit        Respiratory    Chronic sinusitis    Relevant Medications    fluticasone (FLONASE) 50 mcg/act nasal spray      Other Visit Diagnoses     Diarrhea, unspecified type    -  Primary    Sore throat        Exposure to SARS-associated coronavirus        Relevant Orders    Novel Coronavirus (COVID-19), PCR [facility] - Collected at [facility] or [facility]        Afrin nasal spray for 3 days followed by fluticasone (Flonase) 2 sprays in each nostril daily  Supportive care discussed and advised  Advised to RTO for any worsening and no improvement  Follow up for no improvement and worsening of conditions  Patient advised and educated when to see immediate medical care  Increase fluid intake, saline nasal rinses, and hot tea with honey and lemon  Cool air humidification can be helpful as well  May take Tylenol as needed for pain or fevers  Mucinex D for sinus congestion or Coricidin HBP if you have high blood pressure or a heart condition  Mucinex or Robitussin DM are effective for cough and chest congestion  Advise to avoid use of NSAIDs like ibuprofen, advil, motrin, alleve, mobic  Eat low residue diet with food low in fiber and follow bland diet  Avoid Caffeine for 2 weeks  Avoid dairy products  Increase your fluid intake  Keep a record of the number of times you urinate during the day  You may slowly resume your normal level of activity once you feel better  Pepto Bismol and kaopectate as needed  Do not take any imodium for diarrhea  Follow up for no improvement and worsening of symptoms and for fever, localized and severe abdominal pain, recurrent nausea, vomiting, and diarrhea      If no improvement in 48 to 72 hours, will do COVID-19 testing             Reason for visit is   Chief Complaint   Patient presents with    Virtual Regular Visit        Encounter provider LEOPOLDO Terry    Provider located at  [address]  [address]  [address] [facility] [address]  [place] [address]      Recent Visits  No visits were found meeting these conditions  Showing recent visits within past 7 days and meeting all other requirements     Today's Visits  Date Type Provider Dept   08/03/20 Telemedicine Francis Wallis today's visits and meeting all other requirements     Future Appointments  No visits were found meeting these conditions  Showing future appointments within next 150 days and meeting all other requirements        The patient was identified by name and date of birth  Kori Kristan was informed that this is a telemedicine visit and that the visit is being conducted through [employer] [address] [employer] and patient was informed that this is not a secure, HIPAA-complaint platform  She agrees to proceed     My office door was closed  No one else was in the room  She acknowledged consent and understanding of privacy and security of the video platform  The patient has agreed to participate and understands they can discontinue the visit at any time  Patient is aware this is a billable service  Rudolph Hernandez is a 63 y o  female    HPI   Patient stated that went for camping last week in [place]  Started with congestion and sore throat last week and  having chills too  Denies fever, sob, chest pain, loss of sense of smell and taste  Stated that had diarrhea one day last week and today again  Denies abdominal pain, nausea and vomiting  Taking mucinex and sudafed for congestion  Not taking anything for diarrhea  Stated that does have chronic sinus issues too         Past Medical History:   Diagnosis Date    Anxiety     Cellulitis     Chronic rhinitis     Last Assessed:6/6/2016    GERD without esophagitis     Last Assessed:6/6/2016    Hyperlipidemia     Hypometabolism     Vitamin D deficiency        Past Surgical History:   Procedure Laterality Date    COLONOSCOPY         Current Outpatient Medications   Medication Sig Dispense Refill    Ascorbic Acid (VITAMIN C) 100 MG tablet Take 100 mg by mouth daily      atorvastatin (LIPITOR) 10 mg tablet Take 1 tablet (10 mg total) by mouth daily 90 tablet 3    cetirizine (ZyrTEC) 10 mg tablet Take 10 mg by mouth      Cholecalciferol (VITAMIN D3) 1000 units CAPS Take by mouth 2 (two) times a day      estradiol (ESTRACE VAGINAL) 0 1 mg/g vaginal cream Insert into the vagina      fluticasone (FLONASE) 50 mcg/act nasal spray 1 spray into each nostril daily 48 g 1    Multiple Vitamins-Iron (MULTIVITAMIN/IRON PO) one daily      Omega-3 Fatty Acids (FISH OIL PO) Take 1,000 mg by mouth       No current facility-administered medications for this visit  Allergies   Allergen Reactions    Monascus Purpureus Went Yeast Other (See Comments)    Sulfa Antibiotics Edema    Penicillins Rash       Review of Systems   Constitutional: Positive for chills  Negative for diaphoresis, fatigue, fever and unexpected weight change  HENT: Positive for congestion and sore throat  Negative for dental problem, drooling, ear discharge, ear pain, facial swelling, hearing loss, mouth sores, nosebleeds, postnasal drip, rhinorrhea, sinus pressure, sinus pain, sneezing, tinnitus, trouble swallowing and voice change  Respiratory: Negative for cough, chest tightness, shortness of breath and wheezing  Cardiovascular: Negative  Gastrointestinal: Positive for diarrhea  Negative for abdominal pain, constipation, nausea and vomiting  Musculoskeletal: Negative  Skin: Negative  Neurological: Negative for dizziness, weakness, light-headedness and headaches  Hematological: Negative  Video Exam    There were no vitals filed for this visit  Physical Exam   Constitutional: She is oriented to person, place, and time  She appears well-developed     HENT:   Nose: Nose normal    Eyes: Conjunctivae are normal    Neck: Normal range of motion  Pulmonary/Chest: Effort normal    Neurological: She is alert and oriented to person, place, and time  Skin:   No diaphoresis noted on video call   Psychiatric: Her behavior is normal  Judgment and thought content normal         I spent 10 minutes directly with the patient during this visit      [address] acknowledges that she has consented to an online visit or consultation  She understands that the online visit is based solely on information provided by her, and that, in the absence of a face-to-face physical evaluation by the physician, the diagnosis she receives is both limited and provisional in terms of accuracy and completeness  This is not intended to replace a full medical face-to-face evaluation by the physician  Renae Miranda understands and accepts these terms

## 2020-08-03 NOTE — LETTER
August 3, 2020     Patient: Eneida Webber   YOB: 1955   Date of Visit: 8/3/2020       To Whom it May Concern:    Genevieve Frankyjazmyn is under my professional care  She was seen in my office on 8/3/2020  She may return to work on 08/05/2020  If you have any questions or concerns, please don't hesitate to call           Sincerely,          LEOPOLDO Carmona        CC: No Recipients

## 2020-08-04 ENCOUNTER — TELEPHONE (OUTPATIENT)
Dept: FAMILY MEDICINE CLINIC | Facility: CLINIC | Age: 65
End: 2020-08-04

## 2020-08-04 LAB — SARS-COV-2 RNA SPEC QL NAA+PROBE: NOT DETECTED

## 2020-08-04 NOTE — LETTER
August 4, 2020     Patient: Erin Castro   YOB: 1955   Date of Visit: 8/4/2020       To Whom it May Concern:    Jenaro Yan is under my professional care  She may return to work on 08/6/2020  If you have any questions or concerns, please don't hesitate to call           Sincerely,          Jose Coley MA        CC: No Recipients

## 2020-08-04 NOTE — TELEPHONE ENCOUNTER
Patient aware, she is requesting a note to return to work on 08/06/20(not 08/05/20) as she is still feeling sick   Please advise Carisa Howard MA

## 2020-08-04 NOTE — TELEPHONE ENCOUNTER
----- Message from Pikk 20 sent at 8/4/2020  3:17 PM EDT -----  Please let patient know that her COVID-19 test is negative   LEOPOLDO Espinoza

## 2020-09-17 ENCOUNTER — TELEPHONE (OUTPATIENT)
Dept: FAMILY MEDICINE CLINIC | Facility: CLINIC | Age: 65
End: 2020-09-17

## 2020-09-17 ENCOUNTER — OFFICE VISIT (OUTPATIENT)
Dept: FAMILY MEDICINE CLINIC | Facility: CLINIC | Age: 65
End: 2020-09-17
Payer: COMMERCIAL

## 2020-09-17 VITALS
RESPIRATION RATE: 16 BRPM | WEIGHT: 169 LBS | BODY MASS INDEX: 28.85 KG/M2 | HEIGHT: 64 IN | TEMPERATURE: 97 F | DIASTOLIC BLOOD PRESSURE: 68 MMHG | SYSTOLIC BLOOD PRESSURE: 126 MMHG | HEART RATE: 64 BPM

## 2020-09-17 DIAGNOSIS — R10.2 SUPRAPUBIC PRESSURE: Primary | ICD-10-CM

## 2020-09-17 DIAGNOSIS — N39.3 STRESS INCONTINENCE: ICD-10-CM

## 2020-09-17 LAB
SL AMB  POCT GLUCOSE, UA: ABNORMAL
SL AMB LEUKOCYTE ESTERASE,UA: ABNORMAL
SL AMB POCT BILIRUBIN,UA: ABNORMAL
SL AMB POCT BLOOD,UA: ABNORMAL
SL AMB POCT CLARITY,UA: ABNORMAL
SL AMB POCT COLOR,UA: YELLOW
SL AMB POCT KETONES,UA: ABNORMAL
SL AMB POCT NITRITE,UA: ABNORMAL
SL AMB POCT PH,UA: 7
SL AMB POCT SPECIFIC GRAVITY,UA: 1.01
SL AMB POCT URINE PROTEIN: ABNORMAL
SL AMB POCT UROBILINOGEN: 0.2

## 2020-09-17 PROCEDURE — 99213 OFFICE O/P EST LOW 20 MIN: CPT | Performed by: NURSE PRACTITIONER

## 2020-09-17 PROCEDURE — 3725F SCREEN DEPRESSION PERFORMED: CPT | Performed by: NURSE PRACTITIONER

## 2020-09-17 PROCEDURE — 81003 URINALYSIS AUTO W/O SCOPE: CPT | Performed by: NURSE PRACTITIONER

## 2020-09-17 PROCEDURE — 1036F TOBACCO NON-USER: CPT | Performed by: NURSE PRACTITIONER

## 2020-09-17 NOTE — TELEPHONE ENCOUNTER
NAVEEN    Patient needs a work note for today  Patient will call back with a fax number for her work

## 2020-09-17 NOTE — PROGRESS NOTES
Assessment/Plan:    FAUSTO pollo  Will send for culture and have pt hydrate  Discussed that symptoms are consistent with pelvic floor prolapse  She was referred to a urogynecologist by her ob/gyn, but has not yet scheduled an appt   Reports she has also seen Dr Froy Lopez years ago  Advised that if urine culture is negative and her symptoms do not improve, would recommend urogyn eval      1  Suprapubic pressure  -     POCT urine dip auto non-scope  -     Urine culture    2  Urinary incontinence, unspecified type  -     Urine culture            There are no Patient Instructions on file for this visit  No follow-ups on file  Subjective:      Patient ID: Franklin Green is a 63 y o  female  Chief Complaint   Patient presents with    Urinary Incontinence     C/O recent dribbling of urine and now has pressure with urination  Nausea as well this am JMoyleLPN       Here today with complaints of urinary incontinence  This started about a year ago with mild leakage  Feels this is getting worse with time, however over the past few days, she has been experiencing suprapubic and vaginal pressure  She has needed to wear a liner in the past, but now needs to consistently  She notes frequency, but denies any hematuria, dysuria, flank pain, or vaginal itching or discharge  She does not experience any leakage overnight  No prior history of UTI  Saw her ob/gyn in February of this year and did discuss her incontinence issues with him at that time  The following portions of the patient's history were reviewed and updated as appropriate: allergies, current medications, past family history, past medical history, past social history, past surgical history and problem list     Review of Systems   Constitutional: Negative for chills, fatigue and fever  Respiratory: Negative for cough and shortness of breath  Cardiovascular: Negative for chest pain     Gastrointestinal: Negative for abdominal pain, diarrhea, nausea and vomiting  Genitourinary: Positive for vaginal pain (pressure)  Negative for dysuria, flank pain, frequency, hematuria, pelvic pain, urgency and vaginal discharge  See HPI   Musculoskeletal: Negative for arthralgias and back pain  Current Outpatient Medications   Medication Sig Dispense Refill    Ascorbic Acid (VITAMIN C) 100 MG tablet Take 100 mg by mouth daily      atorvastatin (LIPITOR) 10 mg tablet Take 1 tablet (10 mg total) by mouth daily 90 tablet 3    cetirizine (ZyrTEC) 10 mg tablet Take 10 mg by mouth      Cholecalciferol (VITAMIN D3) 1000 units CAPS Take by mouth daily       estradiol (ESTRACE VAGINAL) 0 1 mg/g vaginal cream Insert into the vagina      fluticasone (FLONASE) 50 mcg/act nasal spray 1 spray into each nostril daily 48 g 1    Multiple Vitamins-Iron (MULTIVITAMIN/IRON PO) daily       Omega-3 Fatty Acids (FISH OIL PO) Take 1,000 mg by mouth       No current facility-administered medications for this visit  Objective:    /68   Pulse 64   Temp (!) 97 °F (36 1 °C)   Resp 16   Ht 5' 4" (1 626 m)   Wt 76 7 kg (169 lb)   BMI 29 01 kg/m²        Physical Exam  Vitals signs and nursing note reviewed  Constitutional:       Appearance: She is well-developed  Cardiovascular:      Rate and Rhythm: Normal rate and regular rhythm  Heart sounds: S1 normal and S2 normal    Pulmonary:      Effort: Pulmonary effort is normal       Breath sounds: Normal breath sounds  Abdominal:      General: Bowel sounds are normal  There is no distension  Tenderness: There is no abdominal tenderness  Comments: Bladder nondistended   Skin:     General: Skin is warm and dry                  [facility]

## 2020-09-18 LAB
BACTERIA UR CULT: NORMAL
Lab: NORMAL

## 2020-09-19 DIAGNOSIS — E78.2 MIXED HYPERLIPIDEMIA: ICD-10-CM

## 2020-09-19 RX ORDER — ATORVASTATIN CALCIUM 10 MG/1
10 TABLET, FILM COATED ORAL DAILY
Qty: 90 TABLET | Refills: 3 | Status: SHIPPED | OUTPATIENT
Start: 2020-09-19 | End: 2020-12-22 | Stop reason: SDUPTHER

## 2020-09-21 ENCOUNTER — TELEPHONE (OUTPATIENT)
Dept: FAMILY MEDICINE CLINIC | Facility: CLINIC | Age: 65
End: 2020-09-21

## 2020-09-21 NOTE — TELEPHONE ENCOUNTER
Left message on machine requesting a call back  [place] Call, MAHSA Santos's information is    [address], [address] [facility]    [phone]      [place] Call, [address] [facility]

## 2020-09-21 NOTE — TELEPHONE ENCOUNTER
Please let pt know that her urine culture came back negative  If she is still symptomatic, I would recommend that she schedule an appointment with Dr Robe Condon, urogynecology, for further evaluation   Elenore Model

## 2020-12-19 LAB
ALBUMIN SERPL-MCNC: 4.1 G/DL (ref 3.8–4.8)
ALBUMIN/GLOB SERPL: 1.9 {RATIO} (ref 1.2–2.2)
ALP SERPL-CCNC: 95 IU/L (ref 39–117)
ALT SERPL-CCNC: 19 IU/L (ref 0–32)
AST SERPL-CCNC: 24 IU/L (ref 0–40)
BILIRUB SERPL-MCNC: 0.6 MG/DL (ref 0–1.2)
BUN SERPL-MCNC: 14 MG/DL (ref 8–27)
BUN/CREAT SERPL: 22 (ref 12–28)
CALCIUM SERPL-MCNC: 9.2 MG/DL (ref 8.7–10.3)
CHLORIDE SERPL-SCNC: 105 MMOL/L (ref 96–106)
CHOLEST SERPL-MCNC: 169 MG/DL (ref 100–199)
CHOLEST/HDLC SERPL: 2.8 RATIO (ref 0–4.4)
CO2 SERPL-SCNC: 26 MMOL/L (ref 20–29)
CREAT SERPL-MCNC: 0.63 MG/DL (ref 0.57–1)
GLOBULIN SER-MCNC: 2.2 G/DL (ref 1.5–4.5)
GLUCOSE SERPL-MCNC: 92 MG/DL (ref 65–99)
HDLC SERPL-MCNC: 60 MG/DL
LDLC SERPL CALC-MCNC: 93 MG/DL (ref 0–99)
MICRODELETION SYND BLD/T FISH: NORMAL
POTASSIUM SERPL-SCNC: 4.1 MMOL/L (ref 3.5–5.2)
PROT SERPL-MCNC: 6.3 G/DL (ref 6–8.5)
SL AMB EGFR AFRICAN AMERICAN: 110 ML/MIN/1.73
SL AMB EGFR NON AFRICAN AMERICAN: 96 ML/MIN/1.73
SL AMB VLDL CHOLESTEROL CALC: 16 MG/DL (ref 5–40)
SODIUM SERPL-SCNC: 142 MMOL/L (ref 134–144)
TRIGL SERPL-MCNC: 85 MG/DL (ref 0–149)

## 2020-12-22 DIAGNOSIS — E78.2 MIXED HYPERLIPIDEMIA: ICD-10-CM

## 2020-12-22 RX ORDER — ATORVASTATIN CALCIUM 10 MG/1
10 TABLET, FILM COATED ORAL DAILY
Qty: 90 TABLET | Refills: 3 | Status: SHIPPED | OUTPATIENT
Start: 2020-12-22 | End: 2021-10-01

## 2021-02-05 ENCOUNTER — TRANSCRIBE ORDERS (OUTPATIENT)
Dept: ADMINISTRATIVE | Facility: HOSPITAL | Age: 66
End: 2021-02-05

## 2021-02-05 DIAGNOSIS — Z12.31 ENCOUNTER FOR SCREENING MAMMOGRAM FOR MALIGNANT NEOPLASM OF BREAST: Primary | ICD-10-CM

## 2021-02-24 ENCOUNTER — TELEMEDICINE (OUTPATIENT)
Dept: FAMILY MEDICINE CLINIC | Facility: CLINIC | Age: 66
End: 2021-02-24
Payer: COMMERCIAL

## 2021-02-24 DIAGNOSIS — J01.91 ACUTE RECURRENT SINUSITIS, UNSPECIFIED LOCATION: Primary | ICD-10-CM

## 2021-02-24 PROCEDURE — 1036F TOBACCO NON-USER: CPT | Performed by: INTERNAL MEDICINE

## 2021-02-24 PROCEDURE — 99213 OFFICE O/P EST LOW 20 MIN: CPT | Performed by: INTERNAL MEDICINE

## 2021-02-24 RX ORDER — AZITHROMYCIN 250 MG/1
TABLET, FILM COATED ORAL
Qty: 6 TABLET | Refills: 0 | Status: SHIPPED | OUTPATIENT
Start: 2021-02-24 | End: 2021-03-01

## 2021-02-24 NOTE — PROGRESS NOTES
Virtual Regular Visit      Assessment/Plan:    Problem List Items Addressed This Visit     None      Visit Diagnoses     Acute recurrent sinusitis, unspecified location    -  Primary    Continue saline rinses, mucinex, flonase  Will add antibiotics and also advised warm compresses  Follow up if not improving  Relevant Medications    azithromycin (Zithromax) 250 mg tablet               Reason for visit is   Chief Complaint   Patient presents with    Virtual Regular Visit        Encounter provider Hilda Gurrola MD    Provider located at  [address]  [place] [address]  [address] [facility] [address]  [place] [address]      Recent Visits  No visits were found meeting these conditions  Showing recent visits within past 7 days and meeting all other requirements     Today's Visits  Date Type Provider Dept   02/24/21 [id number] [facility], [address] [facility] today's visits and meeting all other requirements     Future Appointments  No visits were found meeting these conditions  Showing future appointments within next 150 days and meeting all other requirements        The patient was identified by name and date of birth  Shannon Gillespie was informed that this is a telemedicine visit and that the visit is being conducted through [address] [place] [address] and patient was informed that this is not a secure, HIPAA-compliant platform  She agrees to proceed     My office door was closed  No one else was in the room  She acknowledged consent and understanding of privacy and security of the video platform  The patient has agreed to participate and understands they can discontinue the visit at any time  Patient is aware this is a billable service  Robe Nelson is a 63 y o  female with sinus issues   Calling with complaints of possible sinus infection  She has been congested for many months  She feels she has kept this at [place] [address] with daily saline rinses    Last week she started to have pain in her upper teeth  It can be from side to side  There is no swelling or pain with chewing  She does see the dentist tomorrow  Now her sinuses seem completely blocked  She is not able to get anything out with her rinses  Pressure is worse with bending forward  There is no fever  Past Medical History:   Diagnosis Date    Anxiety     Cellulitis     Chronic rhinitis     Last Assessed:6/6/2016    GERD without esophagitis     Last Assessed:6/6/2016    Hyperlipidemia     Hypometabolism     Vitamin D deficiency        Past Surgical History:   Procedure Laterality Date    COLONOSCOPY         Current Outpatient Medications   Medication Sig Dispense Refill    Ascorbic Acid (VITAMIN C) 100 MG tablet Take 100 mg by mouth daily      atorvastatin (LIPITOR) 10 mg tablet Take 1 tablet (10 mg total) by mouth daily 90 tablet 3    azithromycin (Zithromax) 250 mg tablet Take 2 tablets (500 mg total) by mouth daily for 1 day, THEN 1 tablet (250 mg total) daily for 4 days  6 tablet 0    cetirizine (ZyrTEC) 10 mg tablet Take 10 mg by mouth      Cholecalciferol (VITAMIN D3) 1000 units CAPS Take by mouth daily       estradiol (ESTRACE VAGINAL) 0 1 mg/g vaginal cream Insert into the vagina      fluticasone (FLONASE) 50 mcg/act nasal spray 1 spray into each nostril daily 48 g 1    Multiple Vitamins-Iron (MULTIVITAMIN/IRON PO) daily       Omega-3 Fatty Acids (FISH OIL PO) Take 1,000 mg by mouth       No current facility-administered medications for this visit  Allergies   Allergen Reactions    Monascus Purpureus Went Yeast Other (See Comments)    Sulfa Antibiotics Edema    Penicillins Rash       Review of Systems   Constitutional: Positive for fatigue  Negative for fever  HENT: Positive for congestion and rhinorrhea  Negative for sore throat  Respiratory: Negative for cough and wheezing  Video Exam    There were no vitals filed for this visit      Physical Exam  Constitutional: General: She is not in acute distress  Appearance: She is well-developed  She is not diaphoretic  HENT:      Head: Normocephalic and atraumatic  Neck:      Musculoskeletal: Normal range of motion  Pulmonary:      Effort: Pulmonary effort is normal    Neurological:      Mental Status: She is alert and oriented to person, place, and time  I spent 10 minutes directly with the patient during this visit      [address] acknowledges that she has consented to an online visit or consultation  She understands that the online visit is based solely on information provided by her, and that, in the absence of a face-to-face physical evaluation by the physician, the diagnosis she receives is both limited and provisional in terms of accuracy and completeness  This is not intended to replace a full medical face-to-face evaluation by the physician  Hema Valerio understands and accepts these terms

## 2021-03-30 DIAGNOSIS — Z23 ENCOUNTER FOR IMMUNIZATION: ICD-10-CM

## 2021-06-09 DIAGNOSIS — J32.9 CHRONIC SINUSITIS, UNSPECIFIED LOCATION: ICD-10-CM

## 2021-06-09 RX ORDER — FLUTICASONE PROPIONATE 50 MCG
1 SPRAY, SUSPENSION (ML) NASAL DAILY
Qty: 48 G | Refills: 1 | Status: SHIPPED | OUTPATIENT
Start: 2021-06-09 | End: 2022-05-26 | Stop reason: SDUPTHER

## 2021-06-18 ENCOUNTER — HOSPITAL ENCOUNTER (OUTPATIENT)
Dept: RADIOLOGY | Facility: HOSPITAL | Age: 66
Discharge: HOME | End: 2021-06-18
Payer: COMMERCIAL

## 2021-06-18 ENCOUNTER — TELEPHONE (OUTPATIENT)
Dept: RADIOLOGY | Facility: HOSPITAL | Age: 66
End: 2021-06-18

## 2021-06-18 VITALS — HEIGHT: 64 IN | BODY MASS INDEX: 27.31 KG/M2 | WEIGHT: 160 LBS

## 2021-06-18 DIAGNOSIS — Z12.31 ENCOUNTER FOR SCREENING MAMMOGRAM FOR MALIGNANT NEOPLASM OF BREAST: ICD-10-CM

## 2021-06-18 PROCEDURE — 77063 BREAST TOMOSYNTHESIS BI: CPT

## 2021-06-18 PROCEDURE — 77067 SCR MAMMO BI INCL CAD: CPT

## 2021-10-14 PROCEDURE — U0003 INFECTIOUS AGENT DETECTION BY NUCLEIC ACID (DNA OR RNA); SEVERE ACUTE RESPIRATORY SYNDROME CORONAVIRUS 2 (SARS-COV-2) (CORONAVIRUS DISEASE [COVID-19]), AMPLIFIED PROBE TECHNIQUE, MAKING USE OF HIGH THROUGHPUT TECHNOLOGIES AS DESCRIBED BY CMS-2020-01-R: HCPCS | Performed by: FAMILY MEDICINE

## 2021-10-14 PROCEDURE — U0005 INFEC AGEN DETEC AMPLI PROBE: HCPCS | Performed by: FAMILY MEDICINE

## 2021-10-15 ENCOUNTER — TELEPHONE (OUTPATIENT)
Dept: FAMILY MEDICINE CLINIC | Facility: CLINIC | Age: 66
End: 2021-10-15

## 2021-11-18 ENCOUNTER — OFFICE VISIT (OUTPATIENT)
Dept: FAMILY MEDICINE CLINIC | Facility: CLINIC | Age: 66
End: 2021-11-18
Payer: COMMERCIAL

## 2021-11-18 VITALS
HEIGHT: 63 IN | HEART RATE: 74 BPM | SYSTOLIC BLOOD PRESSURE: 120 MMHG | OXYGEN SATURATION: 97 % | RESPIRATION RATE: 16 BRPM | WEIGHT: 173 LBS | DIASTOLIC BLOOD PRESSURE: 70 MMHG | BODY MASS INDEX: 30.65 KG/M2 | TEMPERATURE: 97.6 F

## 2021-11-18 DIAGNOSIS — E78.2 MIXED HYPERLIPIDEMIA: ICD-10-CM

## 2021-11-18 DIAGNOSIS — Z00.00 WELL ADULT EXAM: Primary | ICD-10-CM

## 2021-11-18 DIAGNOSIS — Z23 NEED FOR VACCINATION: ICD-10-CM

## 2021-11-18 PROCEDURE — 90471 IMMUNIZATION ADMIN: CPT

## 2021-11-18 PROCEDURE — 3725F SCREEN DEPRESSION PERFORMED: CPT | Performed by: INTERNAL MEDICINE

## 2021-11-18 PROCEDURE — 1036F TOBACCO NON-USER: CPT | Performed by: INTERNAL MEDICINE

## 2021-11-18 PROCEDURE — 3008F BODY MASS INDEX DOCD: CPT | Performed by: INTERNAL MEDICINE

## 2021-11-18 PROCEDURE — 99396 PREV VISIT EST AGE 40-64: CPT | Performed by: INTERNAL MEDICINE

## 2021-11-18 PROCEDURE — 90715 TDAP VACCINE 7 YRS/> IM: CPT

## 2021-11-18 RX ORDER — PSEUDOEPHEDRINE HYDROCHLORIDE 30 MG/1
30 TABLET ORAL EVERY 4 HOURS PRN
COMMUNITY

## 2021-11-18 RX ORDER — VITAMIN E 268 MG
400 CAPSULE ORAL DAILY
COMMUNITY
End: 2022-07-28

## 2021-11-18 RX ORDER — SODIUM FLUORIDE 1.1 G/100G
GEL, DENTIFRICE ORAL
COMMUNITY
Start: 2021-08-31 | End: 2022-07-28

## 2021-12-31 LAB
ALBUMIN SERPL-MCNC: 4.4 G/DL (ref 3.8–4.8)
ALBUMIN/GLOB SERPL: 1.9 {RATIO} (ref 1.2–2.2)
ALP SERPL-CCNC: 106 IU/L (ref 44–121)
ALT SERPL-CCNC: 25 IU/L (ref 0–32)
AST SERPL-CCNC: 25 IU/L (ref 0–40)
BASOPHILS # BLD AUTO: 0.1 X10E3/UL (ref 0–0.2)
BASOPHILS NFR BLD AUTO: 2 %
BILIRUB SERPL-MCNC: 0.5 MG/DL (ref 0–1.2)
BUN SERPL-MCNC: 16 MG/DL (ref 8–27)
BUN/CREAT SERPL: 23 (ref 12–28)
CALCIUM SERPL-MCNC: 9.4 MG/DL (ref 8.7–10.3)
CHLORIDE SERPL-SCNC: 100 MMOL/L (ref 96–106)
CHOLEST SERPL-MCNC: 202 MG/DL (ref 100–199)
CHOLEST/HDLC SERPL: 3.1 RATIO (ref 0–4.4)
CO2 SERPL-SCNC: 25 MMOL/L (ref 20–29)
CREAT SERPL-MCNC: 0.7 MG/DL (ref 0.57–1)
EOSINOPHIL # BLD AUTO: 0.4 X10E3/UL (ref 0–0.4)
EOSINOPHIL NFR BLD AUTO: 8 %
ERYTHROCYTE [DISTWIDTH] IN BLOOD BY AUTOMATED COUNT: 12.3 % (ref 11.7–15.4)
GLOBULIN SER-MCNC: 2.3 G/DL (ref 1.5–4.5)
GLUCOSE SERPL-MCNC: 94 MG/DL (ref 65–99)
HCT VFR BLD AUTO: 39.4 % (ref 34–46.6)
HDLC SERPL-MCNC: 66 MG/DL
HGB BLD-MCNC: 13.7 G/DL (ref 11.1–15.9)
IMM GRANULOCYTES # BLD: 0 X10E3/UL (ref 0–0.1)
IMM GRANULOCYTES NFR BLD: 0 %
LDLC SERPL CALC-MCNC: 121 MG/DL (ref 0–99)
LYMPHOCYTES # BLD AUTO: 1.7 X10E3/UL (ref 0.7–3.1)
LYMPHOCYTES NFR BLD AUTO: 37 %
MCH RBC QN AUTO: 32.9 PG (ref 26.6–33)
MCHC RBC AUTO-ENTMCNC: 34.8 G/DL (ref 31.5–35.7)
MCV RBC AUTO: 95 FL (ref 79–97)
MICRODELETION SYND BLD/T FISH: NORMAL
MONOCYTES # BLD AUTO: 0.4 X10E3/UL (ref 0.1–0.9)
MONOCYTES NFR BLD AUTO: 9 %
NEUTROPHILS # BLD AUTO: 2 X10E3/UL (ref 1.4–7)
NEUTROPHILS NFR BLD AUTO: 44 %
PLATELET # BLD AUTO: 218 X10E3/UL (ref 150–450)
POTASSIUM SERPL-SCNC: 4.1 MMOL/L (ref 3.5–5.2)
PROT SERPL-MCNC: 6.7 G/DL (ref 6–8.5)
RBC # BLD AUTO: 4.16 X10E6/UL (ref 3.77–5.28)
SL AMB EGFR AFRICAN AMERICAN: 106 ML/MIN/1.73
SL AMB EGFR NON AFRICAN AMERICAN: 92 ML/MIN/1.73
SL AMB VLDL CHOLESTEROL CALC: 15 MG/DL (ref 5–40)
SODIUM SERPL-SCNC: 138 MMOL/L (ref 134–144)
TRIGL SERPL-MCNC: 85 MG/DL (ref 0–149)
WBC # BLD AUTO: 4.5 X10E3/UL (ref 3.4–10.8)

## 2022-01-03 DIAGNOSIS — E78.2 MIXED HYPERLIPIDEMIA: ICD-10-CM

## 2022-01-05 RX ORDER — ATORVASTATIN CALCIUM 10 MG/1
10 TABLET, FILM COATED ORAL DAILY
Qty: 90 TABLET | Refills: 1 | Status: SHIPPED | OUTPATIENT
Start: 2022-01-05 | End: 2022-07-06

## 2022-01-08 ENCOUNTER — APPOINTMENT (OUTPATIENT)
Dept: RADIOLOGY | Facility: CLINIC | Age: 67
End: 2022-01-08
Payer: COMMERCIAL

## 2022-01-08 ENCOUNTER — OFFICE VISIT (OUTPATIENT)
Dept: URGENT CARE | Facility: CLINIC | Age: 67
End: 2022-01-08
Payer: COMMERCIAL

## 2022-01-08 VITALS
DIASTOLIC BLOOD PRESSURE: 86 MMHG | RESPIRATION RATE: 18 BRPM | HEART RATE: 65 BPM | WEIGHT: 175 LBS | TEMPERATURE: 96.9 F | HEIGHT: 64 IN | BODY MASS INDEX: 29.88 KG/M2 | OXYGEN SATURATION: 99 % | SYSTOLIC BLOOD PRESSURE: 190 MMHG

## 2022-01-08 DIAGNOSIS — S90.112A CONTUSION OF LEFT GREAT TOE WITHOUT DAMAGE TO NAIL, INITIAL ENCOUNTER: ICD-10-CM

## 2022-01-08 DIAGNOSIS — S99.922A INJURY OF LEFT GREAT TOE, INITIAL ENCOUNTER: Primary | ICD-10-CM

## 2022-01-08 DIAGNOSIS — S99.922A INJURY OF LEFT GREAT TOE, INITIAL ENCOUNTER: ICD-10-CM

## 2022-01-08 PROCEDURE — 99213 OFFICE O/P EST LOW 20 MIN: CPT | Performed by: PHYSICIAN ASSISTANT

## 2022-01-08 PROCEDURE — 73660 X-RAY EXAM OF TOE(S): CPT

## 2022-01-08 NOTE — PROGRESS NOTES
[facility]        NAME: Franklin Green is a 61 y o  female  : 1955    MRN: [id number]  DATE: 2022  TIME: 2:02 PM    Assessment and Plan   Injury of left great toe, initial encounter [S99 922A]  1  Injury of left great toe, initial encounter  XR toe left great min 2 views     Patient Instructions   L great toe contusion  xrays negative  Scooter taped to adjacent toe  Rest, ice and elevation  If no improvement in 1 week f/u with ortho    Follow up with PCP in 3-5 days  Proceed to  ER if symptoms worsen  Chief Complaint     Chief Complaint   Patient presents with    Toe Pain     left great big toe injury  History of Present Illness       Santi Eye is a 72-year-old female who presents to clinic complaining of left toe pain x1 day  She states the male night she got up to go the bathroom and stubbed her left toe on the pad of stool of the sink and male night  She notes bruising swelling and decreased range of motion  She notes increased pain with weight-bearing  She denies any history of injury to that toe  She denies any numbness or tingling of the toe  She denies any open wounds or nail damage to the toe  Review of Systems   Review of Systems   Constitutional: Negative  Musculoskeletal: Positive for arthralgias, gait problem and joint swelling  Skin: Positive for color change  Negative for wound       Current Medications       Current Outpatient Medications:     Ascorbic Acid (VITAMIN C) 100 MG tablet, Take 100 mg by mouth daily, Disp: , Rfl:     atorvastatin (LIPITOR) 10 mg tablet, Take 1 tablet (10 mg total) by mouth daily, Disp: 90 tablet, Rfl: 1    cetirizine (ZyrTEC) 10 mg tablet, Take 10 mg by mouth, Disp: , Rfl:     Cholecalciferol (VITAMIN D3) 1000 units CAPS, Take by mouth daily , Disp: , Rfl:     Denta 5000 Plus 1 1 % CREA, , Disp: , Rfl:     estradiol (ESTRACE VAGINAL) 0 1 mg/g vaginal cream, Insert into the vagina, Disp: , Rfl:     fluticasone (FLONASE) 50 mcg/act nasal spray, 1 SPRAY INTO EACH NOSTRIL DAILY, Disp: 48 g, Rfl: 1    Multiple Vitamins-Iron (MULTIVITAMIN/IRON PO), daily , Disp: , Rfl:     Omega-3 Fatty Acids (FISH OIL PO), Take 1,000 mg by mouth, Disp: , Rfl:     pseudoephedrine (SUDAFED) 30 mg tablet, Take 30 mg by mouth every 4 (four) hours as needed, Disp: , Rfl:     vitamin E, tocopherol, 400 units capsule, Take 400 Units by mouth daily, Disp: , Rfl:     Current Allergies     Allergies as of 01/08/2022 - Reviewed 01/08/2022   Allergen Reaction Noted    Monascus [place] went yeast Other (See Comments) 04/06/2015    Sulfa antibiotics Edema 09/26/2013    Other Rash 02/25/2021    Penicillins Rash 09/26/2013            The following portions of the patient's history were reviewed and updated as appropriate: allergies, current medications, past family history, past medical history, past social history, past surgical history and problem list      Past Medical History:   Diagnosis Date    Anxiety     Cellulitis     Chronic rhinitis     Last Assessed:6/6/2016    GERD without esophagitis     Last Assessed:6/6/2016    Hyperlipidemia     Hypometabolism     Vitamin D deficiency        Past Surgical History:   Procedure Laterality Date    COLONOSCOPY         Family History   Problem Relation Age of Onset    Atrial fibrillation Mother     Coronary artery disease Mother     Atrial fibrillation Father     Hypertension Father     Breast cancer Maternal Grandmother 60         Medications have been verified  Objective   BP (!) 190/86   Pulse 65   Temp (!) 96 9 °F (36 1 °C)   Resp 18   Ht 5' 4" (1 626 m)   Wt 79 4 kg (175 lb)   SpO2 99%   BMI 30 04 kg/m²   No LMP recorded  Patient is postmenopausal        Physical Exam     Physical Exam  Vitals and nursing note reviewed  Constitutional:       General: She is not in acute distress  Appearance: Normal appearance  She is not ill-appearing     Cardiovascular:      Rate and Rhythm: Normal rate and regular rhythm  Heart sounds: Normal heart sounds  Pulmonary:      Effort: Pulmonary effort is normal       Breath sounds: Normal breath sounds  Musculoskeletal:      Left foot: Decreased range of motion  Normal capillary refill  Swelling, tenderness and bony tenderness present  No deformity, laceration or crepitus  Normal pulse  Neurological:      Mental Status: She is alert and oriented to person, place, and time     Psychiatric:         Mood and Affect: Mood normal          Behavior: Behavior normal

## 2022-01-10 ENCOUNTER — TELEPHONE (OUTPATIENT)
Dept: FAMILY MEDICINE CLINIC | Facility: CLINIC | Age: 67
End: 2022-01-10

## 2022-01-10 ENCOUNTER — TELEPHONE (OUTPATIENT)
Dept: OBGYN CLINIC | Facility: OTHER | Age: 67
End: 2022-01-10

## 2022-01-10 NOTE — TELEPHONE ENCOUNTER
DR SULTANA [facility]    Patient feel and broke her foot on sat  She needs to see ortho and they cant see her until weds  She would like to speak to you for some medical advice

## 2022-04-05 ENCOUNTER — TELEPHONE (OUTPATIENT)
Dept: FAMILY MEDICINE CLINIC | Facility: CLINIC | Age: 67
End: 2022-04-05

## 2022-04-07 ENCOUNTER — CLINICAL SUPPORT (OUTPATIENT)
Dept: FAMILY MEDICINE CLINIC | Facility: CLINIC | Age: 67
End: 2022-04-07
Payer: COMMERCIAL

## 2022-04-07 ENCOUNTER — TELEPHONE (OUTPATIENT)
Dept: FAMILY MEDICINE CLINIC | Facility: CLINIC | Age: 67
End: 2022-04-07

## 2022-04-07 DIAGNOSIS — Z23 NEED FOR VACCINATION: Primary | ICD-10-CM

## 2022-04-07 PROCEDURE — 90471 IMMUNIZATION ADMIN: CPT

## 2022-04-07 PROCEDURE — 90750 HZV VACC RECOMBINANT IM: CPT

## 2022-05-26 DIAGNOSIS — J32.9 CHRONIC SINUSITIS, UNSPECIFIED LOCATION: ICD-10-CM

## 2022-05-26 RX ORDER — FLUTICASONE PROPIONATE 50 MCG
1 SPRAY, SUSPENSION (ML) NASAL DAILY
Qty: 48 G | Refills: 1 | Status: SHIPPED | OUTPATIENT
Start: 2022-05-26

## 2022-06-20 ENCOUNTER — TELEPHONE (OUTPATIENT)
Dept: FAMILY MEDICINE CLINIC | Facility: CLINIC | Age: 67
End: 2022-06-20

## 2022-06-23 ENCOUNTER — CLINICAL SUPPORT (OUTPATIENT)
Dept: FAMILY MEDICINE CLINIC | Facility: CLINIC | Age: 67
End: 2022-06-23
Payer: COMMERCIAL

## 2022-06-23 DIAGNOSIS — Z23 NEED FOR VACCINATION: Primary | ICD-10-CM

## 2022-06-23 PROCEDURE — 90471 IMMUNIZATION ADMIN: CPT

## 2022-06-23 PROCEDURE — 90750 HZV VACC RECOMBINANT IM: CPT

## 2022-07-06 DIAGNOSIS — E78.2 MIXED HYPERLIPIDEMIA: ICD-10-CM

## 2022-07-06 RX ORDER — ATORVASTATIN CALCIUM 10 MG/1
TABLET, FILM COATED ORAL
Qty: 90 TABLET | Refills: 1 | Status: SHIPPED | OUTPATIENT
Start: 2022-07-06

## 2022-07-20 ENCOUNTER — RA CDI HCC (OUTPATIENT)
Dept: OTHER | Facility: HOSPITAL | Age: 67
End: 2022-07-20

## 2022-07-20 NOTE — PROGRESS NOTES
[facility]  coding opportunities       Chart reviewed, no opportunity found: CHART REVIEWED, NO OPPORTUNITY FOUND        Patients Insurance        Commercial Insurance: Joey Begum

## 2022-07-28 ENCOUNTER — OFFICE VISIT (OUTPATIENT)
Dept: FAMILY MEDICINE CLINIC | Facility: CLINIC | Age: 67
End: 2022-07-28
Payer: COMMERCIAL

## 2022-07-28 VITALS
SYSTOLIC BLOOD PRESSURE: 126 MMHG | DIASTOLIC BLOOD PRESSURE: 80 MMHG | RESPIRATION RATE: 16 BRPM | HEART RATE: 62 BPM | BODY MASS INDEX: 30.9 KG/M2 | WEIGHT: 181 LBS | HEIGHT: 64 IN | OXYGEN SATURATION: 99 %

## 2022-07-28 DIAGNOSIS — Z23 NEED FOR VACCINATION: ICD-10-CM

## 2022-07-28 DIAGNOSIS — E78.2 MIXED HYPERLIPIDEMIA: Primary | ICD-10-CM

## 2022-07-28 PROCEDURE — 90471 IMMUNIZATION ADMIN: CPT

## 2022-07-28 PROCEDURE — 1100F PTFALLS ASSESS-DOCD GE2>/YR: CPT | Performed by: INTERNAL MEDICINE

## 2022-07-28 PROCEDURE — 99213 OFFICE O/P EST LOW 20 MIN: CPT | Performed by: INTERNAL MEDICINE

## 2022-07-28 PROCEDURE — 90677 PCV20 VACCINE IM: CPT

## 2022-07-28 PROCEDURE — 3288F FALL RISK ASSESSMENT DOCD: CPT | Performed by: INTERNAL MEDICINE

## 2022-07-28 PROCEDURE — 3725F SCREEN DEPRESSION PERFORMED: CPT | Performed by: INTERNAL MEDICINE

## 2022-07-28 NOTE — PROGRESS NOTES
Assessment/Plan:    1  Mixed hyperlipidemia  Assessment & Plan: Will continue atorvastatin and asked patient to get labs for evaluation of lipids and LFT's  Encouraged low fat diet and continued exercise  Orders:  -     CBC and differential; Future  -     Comprehensive metabolic panel; Future  -     Lipid panel; Future  -     CBC and differential  -     Comprehensive metabolic panel  -     Lipid panel    2  Need for vaccination  -     Pneumococcal Conjugate Vaccine 20-valent (Pcv20)            There are no Patient Instructions on file for this visit  Return in about 6 months (around 1/28/2023)  Subjective:      Patient ID: Lashon Villanueva is a 74 y o  female  Chief Complaint   Patient presents with    Follow-up     6 month mz cma       Here for follow up of hyperlipidemia  Has been having foot issues and is being cared for by podiatry at [facility]  Otherwise feels well  The following portions of the patient's history were reviewed and updated as appropriate: allergies, current medications, past family history, past medical history, past social history, past surgical history and problem list     Review of Systems   Constitutional: Negative  Respiratory: Negative  Cardiovascular: Negative            Current Outpatient Medications   Medication Sig Dispense Refill    Ascorbic Acid (VITAMIN C) 100 MG tablet Take 100 mg by mouth daily      atorvastatin (LIPITOR) 10 mg tablet TAKE 1 TABLET DAILY 90 tablet 1    Cholecalciferol (VITAMIN D3) 1000 units CAPS Take by mouth daily       estradiol (ESTRACE) 0 1 mg/g vaginal cream Insert into the vagina      fluticasone (FLONASE) 50 mcg/act nasal spray 1 spray into each nostril daily 48 g 1    Multiple Vitamins-Iron (MULTIVITAMIN/IRON PO) daily       Omega-3 Fatty Acids (FISH OIL PO) Take 1,000 mg by mouth      pseudoephedrine (SUDAFED) 30 mg tablet Take 30 mg by mouth every 4 (four) hours as needed       No current facility-administered medications for this visit  Objective:    /80   Pulse 62   Resp 16   Ht 5' 4" (1 626 m)   Wt 82 1 kg (181 lb)   SpO2 99%   BMI 31 07 kg/m²        Physical Exam  Constitutional:       Appearance: Normal appearance  She is well-developed  Eyes:      Conjunctiva/sclera: Conjunctivae normal    Neck:      Thyroid: No thyromegaly  Vascular: No JVD  Cardiovascular:      Rate and Rhythm: Normal rate and regular rhythm  Heart sounds: Normal heart sounds  No murmur heard  No friction rub  No gallop  Pulmonary:      Effort: Pulmonary effort is normal       Breath sounds: Normal breath sounds  No wheezing or rales  Abdominal:      General: Bowel sounds are normal  There is no distension  Palpations: Abdomen is soft  Tenderness: There is no abdominal tenderness  Musculoskeletal:      Cervical back: Neck supple  Neurological:      Mental Status: She is alert                  Jay Montiel MD

## 2022-07-28 NOTE — ASSESSMENT & PLAN NOTE
Will continue atorvastatin and asked patient to get labs for evaluation of lipids and LFT's  Encouraged low fat diet and continued exercise

## 2022-09-19 ENCOUNTER — HOSPITAL ENCOUNTER (OUTPATIENT)
Dept: RADIOLOGY | Facility: HOSPITAL | Age: 67
Discharge: HOME | End: 2022-09-19
Payer: COMMERCIAL

## 2022-09-19 VITALS — BODY MASS INDEX: 30.73 KG/M2 | HEIGHT: 64 IN | WEIGHT: 180 LBS

## 2022-09-19 DIAGNOSIS — Z12.31 ENCOUNTER FOR SCREENING MAMMOGRAM FOR MALIGNANT NEOPLASM OF BREAST: ICD-10-CM

## 2022-09-19 PROCEDURE — 77063 BREAST TOMOSYNTHESIS BI: CPT

## 2022-09-19 PROCEDURE — 77067 SCR MAMMO BI INCL CAD: CPT

## 2022-11-12 LAB
ALBUMIN SERPL-MCNC: 4.1 G/DL (ref 3.8–4.8)
ALBUMIN/GLOB SERPL: 1.8 {RATIO} (ref 1.2–2.2)
ALP SERPL-CCNC: 97 IU/L (ref 44–121)
ALT SERPL-CCNC: 20 IU/L (ref 0–32)
AST SERPL-CCNC: 23 IU/L (ref 0–40)
BASOPHILS # BLD AUTO: 0.1 X10E3/UL (ref 0–0.2)
BASOPHILS NFR BLD AUTO: 2 %
BILIRUB SERPL-MCNC: 0.5 MG/DL (ref 0–1.2)
BUN SERPL-MCNC: 11 MG/DL (ref 8–27)
BUN/CREAT SERPL: 16 (ref 12–28)
CALCIUM SERPL-MCNC: 9 MG/DL (ref 8.7–10.3)
CHLORIDE SERPL-SCNC: 103 MMOL/L (ref 96–106)
CHOLEST SERPL-MCNC: 172 MG/DL (ref 100–199)
CHOLEST/HDLC SERPL: 2.8 RATIO (ref 0–4.4)
CO2 SERPL-SCNC: 24 MMOL/L (ref 20–29)
CREAT SERPL-MCNC: 0.69 MG/DL (ref 0.57–1)
EGFR: 96 ML/MIN/1.73
EOSINOPHIL # BLD AUTO: 0.3 X10E3/UL (ref 0–0.4)
EOSINOPHIL NFR BLD AUTO: 7 %
ERYTHROCYTE [DISTWIDTH] IN BLOOD BY AUTOMATED COUNT: 12.2 % (ref 11.7–15.4)
GLOBULIN SER-MCNC: 2.3 G/DL (ref 1.5–4.5)
GLUCOSE SERPL-MCNC: 93 MG/DL (ref 70–99)
HCT VFR BLD AUTO: 38.2 % (ref 34–46.6)
HDLC SERPL-MCNC: 61 MG/DL
HGB BLD-MCNC: 12.9 G/DL (ref 11.1–15.9)
IMM GRANULOCYTES # BLD: 0 X10E3/UL (ref 0–0.1)
IMM GRANULOCYTES NFR BLD: 0 %
LDLC SERPL CALC-MCNC: 99 MG/DL (ref 0–99)
LYMPHOCYTES # BLD AUTO: 1.3 X10E3/UL (ref 0.7–3.1)
LYMPHOCYTES NFR BLD AUTO: 35 %
MCH RBC QN AUTO: 32.2 PG (ref 26.6–33)
MCHC RBC AUTO-ENTMCNC: 33.8 G/DL (ref 31.5–35.7)
MCV RBC AUTO: 95 FL (ref 79–97)
MICRODELETION SYND BLD/T FISH: NORMAL
MONOCYTES # BLD AUTO: 0.3 X10E3/UL (ref 0.1–0.9)
MONOCYTES NFR BLD AUTO: 9 %
NEUTROPHILS # BLD AUTO: 1.8 X10E3/UL (ref 1.4–7)
NEUTROPHILS NFR BLD AUTO: 47 %
PLATELET # BLD AUTO: 201 X10E3/UL (ref 150–450)
POTASSIUM SERPL-SCNC: 4.4 MMOL/L (ref 3.5–5.2)
PROT SERPL-MCNC: 6.4 G/DL (ref 6–8.5)
RBC # BLD AUTO: 4.01 X10E6/UL (ref 3.77–5.28)
SL AMB VLDL CHOLESTEROL CALC: 12 MG/DL (ref 5–40)
SODIUM SERPL-SCNC: 139 MMOL/L (ref 134–144)
TRIGL SERPL-MCNC: 59 MG/DL (ref 0–149)
WBC # BLD AUTO: 3.7 X10E3/UL (ref 3.4–10.8)

## 2022-12-03 DIAGNOSIS — J32.9 CHRONIC SINUSITIS, UNSPECIFIED LOCATION: ICD-10-CM

## 2022-12-05 RX ORDER — FLUTICASONE PROPIONATE 50 MCG
1 SPRAY, SUSPENSION (ML) NASAL DAILY
Qty: 48 G | Refills: 1 | Status: SHIPPED | OUTPATIENT
Start: 2022-12-05

## 2022-12-15 ENCOUNTER — OFFICE VISIT (OUTPATIENT)
Dept: FAMILY MEDICINE CLINIC | Facility: CLINIC | Age: 67
End: 2022-12-15

## 2022-12-15 VITALS
DIASTOLIC BLOOD PRESSURE: 80 MMHG | TEMPERATURE: 99.6 F | SYSTOLIC BLOOD PRESSURE: 114 MMHG | RESPIRATION RATE: 16 BRPM | HEIGHT: 64 IN | HEART RATE: 92 BPM | WEIGHT: 182 LBS | BODY MASS INDEX: 31.07 KG/M2

## 2022-12-15 DIAGNOSIS — U07.1 COVID-19: Primary | ICD-10-CM

## 2022-12-15 RX ORDER — NIRMATRELVIR AND RITONAVIR 300-100 MG
3 KIT ORAL 2 TIMES DAILY
Qty: 30 TABLET | Refills: 0 | Status: SHIPPED | OUTPATIENT
Start: 2022-12-15 | End: 2022-12-20

## 2022-12-15 NOTE — PROGRESS NOTES
COVID-19 Outpatient Progress Note    Assessment/Plan:    Problem List Items Addressed This Visit    None  Visit Diagnoses     COVID-19    -  Primary    Relevant Medications    nirmatrelvir & ritonavir (Paxlovid, 300/100,) tablet therapy pack         Anticipate return to work on 12/20/22 but she needs to be reevaluated to return as required by her employer    Disposition:     Patient has asymptomatic or mild COVID-19 infection  Based off CDC guidelines, they were recommended to isolate for 5 days  If they are asymptomatic or symptoms are improving with no fevers in the past 24 hours, isolation may be ended followed by 5 days of wearing a mask when around othes to minimize risk of infecting others  If still have a fever or other symptoms have not improved, continue to isolate until they improve  Regardless of when they end isolation, avoid being around people who are more likely to get very sick from COVID-19 until at least day 11  Patient meets criteria for PAXLOVID and they have been counseled appropriately according to EUA documentation released by the FDA  After discussion, patient agrees to treatment  Patricia Mantle is an investigational medicine used to treat mild-to-moderate COVID-19 in adults and children (15years of age and older weighing at least 80 pounds (40 kg)) with positive results of direct SARS-CoV-2 viral testing, and who are at high risk for progression to severe COVID-19, including hospitalization or death  PAXLOVID is investigational because it is still being studied  There is limited information about the safety and effectiveness of using PAXLOVID to treat people with mild-to-moderate COVID-19      The FDA has authorized the emergency use of PAXLOVID for the treatment of mild-tomoderate COVID-19 in adults and children (15years of age and older weighing at least 80 pounds (40 kg)) with a positive test for the virus that causes COVID-19, and who are at high risk for progression to severe COVID-19, including hospitalization or death, under an EUA  What should I tell my healthcare provider before I take PAXLOVID? Tell your healthcare provider if you:  - Have any allergies  - Have liver or kidney disease  - Are pregnant or plan to become pregnant  - Are breastfeeding a child  - Have any serious illnesses    Tell your healthcare provider about all the medicines you take, including prescription and over-the-counter medicines, vitamins, and herbal supplements  Some medicines may interact with PAXLOVID and may cause serious side effects  Keep a list of your medicines to show your healthcare provider and pharmacist when you get a new medicine  You can ask your healthcare provider or pharmacist for a list of medicines that interact with PAXLOVID  Do not start taking a new medicine without telling your healthcare provider  Your healthcare provider can tell you if it is safe to take PAXLOVID with other medicines  Tell your healthcare provider if you are taking combined hormonal contraceptive  PAXLOVID may affect how your birth control pills work  Females who are able to become pregnant should use another effective alternative form of contraception or an additional barrier method of contraception  Talk to your healthcare provider if you have any questions about contraceptive methods that might be right for you  How do I take PAXLOVID? PAXLOVID consists of 2 medicines: nirmatrelvir and ritonavir  - Take 2 pink tablets of nirmatrelvir with 1 white tablet of ritonavir by mouth 2 times each day (in the morning and in the evening) for 5 days  For each dose, take all 3 tablets at the same time  - If you have kidney disease, talk to your healthcare provider  You may need a different dose  - Swallow the tablets whole  Do not chew, break, or crush the tablets  - Take PAXLOVID with or without food    - Do not stop taking PAXLOVID without talking to your healthcare provider, even if you feel better  - If you miss a dose of PAXLOVID within 8 hours of the time it is usually taken, take it as soon as you remember  If you miss a dose by more than 8 hours, skip the missed dose and take the next dose at your regular time  Do not take 2 doses of PAXLOVID at the same time  - If you take too much PAXLOVID, call your healthcare provider or go to the nearest hospital emergency room right away  - If you are taking a ritonavir- or cobicistat-containing medicine to treat hepatitis C or Human Immunodeficiency Virus (HIV), you should continue to take your medicine as prescribed by your healthcare provider   - Talk to your healthcare provider if you do not feel better or if you feel worse after 5 days  Who should generally not take PAXLOVID? Do not take PAXLOVID if:  You are allergic to nirmatrelvir, ritonavir, or any of the ingredients in PAXLOVID  You are taking any of the following medicines:  - Alfuzosin  - Pethidine, piroxicam, propoxyphene  - Ranolazine  - Amiodarone, dronedarone, flecainide, propafenone, quinidine  - Colchicine  - Lurasidone, pimozide, clozapine  - Dihydroergotamine, ergotamine, methylergonovine  - Lovastatin, simvastatin  - Sildenafil (Revatio®) for pulmonary arterial hypertension (PAH)  - Triazolam, oral midazolam  - Apalutamide  - Carbamazepine, phenobarbital, phenytoin  - Rifampin  - St  Eduardo’s Wort (hypericum perforatum)    What are the important possible side effects of PAXLOVID? Possible side effects of PAXLOVID are:  - Liver Problems  Tell your healthcare provider right away if you have any of these signs and symptoms of liver problems: loss of appetite, yellowing of your skin and the whites of eyes (jaundice), dark-colored urine, pale colored stools and itchy skin, stomach area (abdominal) pain  - Resistance to HIV Medicines  If you have untreated HIV infection, PAXLOVID may lead to some HIV medicines not working as well in the future    - Other possible side effects include: altered sense of taste, diarrhea, high blood pressure, or muscle aches    These are not all the possible side effects of PAXLOVID  Not many people have taken PAXLOVID  Serious and unexpected side effects may happen  Noel Jones is still being studied, so it is possible that all of the risks are not known at this time  What other treatment choices are there? Like [facility] may allow for the emergency use of other medicines to treat people with COVID-19  Go to https://[employer]/ for information on the emergency use of other medicines that are authorized by FDA to treat people with COVID-19  Your healthcare provider may talk with you about clinical trials for which you may be eligible  It is your choice to be treated or not to be treated with PAXLOVID  Should you decide not to receive it or for your child not to receive it, it will not change your standard medical care  What if I am pregnant or breastfeeding? There is no experience treating pregnant women or breastfeeding mothers with PAXLOVID  For a mother and unborn baby, the benefit of taking PAXLOVID may be greater than the risk from the treatment  If you are pregnant, discuss your options and specific situation with your healthcare provider  It is recommended that you use effective barrier contraception or do not have sexual activity while taking PAXLOVID  If you are breastfeeding, discuss your options and specific situation with your healthcare provider  How do I report side effects with PAXLOVID? Contact your healthcare provider if you have any side effects that bother you or do not go away  Report side effects to FDA MedWatch at www fda gov/medwatch or call 8-239-ANT0252 or you can report side effects to [facility]  at the contact information provided below      Website Fax number Telephone number www [employer] [phone]     How should I store [address]? Store PAXLOVID tablets at room temperature between 68°F to 77°F (20°C to 25°C)  Full fact sheet for patients, parents, and caregivers can be found at: [employer] [place]    I have spent 10 minutes directly with the patient  Encounter provider: Amanda Omalley DO     Provider located at:  [address]  [address] [address]     Recent Visits  No visits were found meeting these conditions  Showing recent visits within past 7 days and meeting all other requirements  Today's Visits  Date Type Provider Dept   12/15/22 Office Visit Amanda Omalley, [address] today's visits and meeting all other requirements  Future Appointments  No visits were found meeting these conditions  Showing future appointments within next 150 days and meeting all other requirements     Subjective:   Ángel Delatorre is a 74 y o  female who has been screened for COVID-19  Symptom change since last report: worsening  Patient's symptoms include fever, chills, malaise, nasal congestion, sore throat, cough, nausea and headache  Patient denies anosmia, loss of taste, shortness of breath, chest tightness and diarrhea  - Date of symptom onset: 12/14/2022  - Date of positive COVID-19 test: 12/15/2022  Type of test: Home antigen  Patient with typical symptoms of COVID-19 and they attest that they were positive on home rapid antigen testing  Image of positive result is not able to be uploaded into their chart  COVID-19 vaccination status: Fully vaccinated (primary series)    Lab Results   Component Value Date    SARSCOV2 Negative 10/14/2021    Elias Ormond Not Detected 08/03/2020       Review of Systems   Constitutional: Positive for chills and fever  HENT: Positive for congestion and sore throat  Respiratory: Positive for cough   Negative for chest tightness and shortness of breath  Gastrointestinal: Positive for nausea  Negative for diarrhea  Neurological: Positive for headaches  Current Outpatient Medications on File Prior to Visit   Medication Sig   • Ascorbic Acid (VITAMIN C) 100 MG tablet Take 100 mg by mouth daily   • atorvastatin (LIPITOR) 10 mg tablet TAKE 1 TABLET DAILY   • Cholecalciferol (VITAMIN D3) 1000 units CAPS Take by mouth daily    • estradiol (ESTRACE) 0 1 mg/g vaginal cream Insert into the vagina   • fluticasone (FLONASE) 50 mcg/act nasal spray 1 SPRAY INTO EACH NOSTRIL DAILY   • Multiple Vitamins-Iron (MULTIVITAMIN/IRON PO) daily    • Omega-3 Fatty Acids (FISH OIL PO) Take 1,000 mg by mouth   • pseudoephedrine (SUDAFED) 30 mg tablet Take 30 mg by mouth every 4 (four) hours as needed       Objective:    /80   Pulse 92   Temp 99 6 °F (37 6 °C)   Resp 16   Ht 5' 4" (1 626 m)   Wt 82 6 kg (182 lb)   BMI 31 24 kg/m²      Physical Exam  Vitals and nursing note reviewed  Constitutional:       General: She is not in acute distress  Appearance: She is well-developed  HENT:      Head: Normocephalic and atraumatic  Eyes:      Conjunctiva/sclera: Conjunctivae normal    Cardiovascular:      Rate and Rhythm: Normal rate and regular rhythm  Heart sounds: No murmur heard  Pulmonary:      Effort: Pulmonary effort is normal  No respiratory distress  Breath sounds: Normal breath sounds  Abdominal:      Palpations: Abdomen is soft  Tenderness: There is no abdominal tenderness  Musculoskeletal:         General: No swelling  Cervical back: Neck supple  Skin:     General: Skin is warm and dry  Capillary Refill: Capillary refill takes less than 2 seconds  Neurological:      Mental Status: She is alert     Psychiatric:         Mood and Affect: Mood normal        Marisol Gould,

## 2022-12-19 ENCOUNTER — OFFICE VISIT (OUTPATIENT)
Dept: FAMILY MEDICINE CLINIC | Facility: CLINIC | Age: 67
End: 2022-12-19

## 2022-12-19 VITALS
OXYGEN SATURATION: 98 % | TEMPERATURE: 97.6 F | RESPIRATION RATE: 16 BRPM | DIASTOLIC BLOOD PRESSURE: 84 MMHG | HEIGHT: 64 IN | HEART RATE: 74 BPM | SYSTOLIC BLOOD PRESSURE: 128 MMHG | WEIGHT: 177 LBS | BODY MASS INDEX: 30.22 KG/M2

## 2022-12-19 DIAGNOSIS — U07.1 COVID-19 VIRUS INFECTION: Primary | ICD-10-CM

## 2022-12-19 NOTE — LETTER
December 19, 2022     Patient: Nadja Mcgraw  YOB: 1955  Date of Visit: 12/19/2022      To Whom it May Concern:    Mic Juan is under my professional care  Cinthiagregory Reyes was seen in my office on 12/19/2022  Jose Reyes may return to work 12/20/22  If you have any questions or concerns, please don't hesitate to call         Sincerely,          LEOPOLDO Ocampo        CC: No Recipients

## 2022-12-19 NOTE — PROGRESS NOTES
Assessment/Plan:    Symptoms overall improving  Note given to return to work    1  COVID-19 virus infection          There are no Patient Instructions on file for this visit  Return if symptoms worsen or fail to improve  Subjective:      Patient ID: Dustin Mendoza is a 74 y o  female  Chief Complaint   Patient presents with   • Follow-up     Covid 19 Clearance mz cma       Following up on Covid 19  Symptom onset 12/14/22  She feels well overall, but still feels fatigued  Has a mild intermittent cough  Tolerated Paxlovid well  The following portions of the patient's history were reviewed and updated as appropriate: allergies, current medications, past family history, past medical history, past social history, past surgical history and problem list     Review of Systems   Constitutional: Positive for fatigue  Negative for chills and fever  HENT: Positive for postnasal drip  Negative for congestion, ear pain, rhinorrhea, sinus pressure and sore throat  Respiratory: Positive for cough  Negative for shortness of breath and wheezing  Cardiovascular: Negative for chest pain  Gastrointestinal: Negative for abdominal pain, diarrhea, nausea and vomiting  Musculoskeletal: Negative for arthralgias  Skin: Negative for rash  Neurological: Negative for headaches           Current Outpatient Medications   Medication Sig Dispense Refill   • Ascorbic Acid (VITAMIN C) 100 MG tablet Take 100 mg by mouth daily     • atorvastatin (LIPITOR) 10 mg tablet TAKE 1 TABLET DAILY 90 tablet 1   • Cholecalciferol (VITAMIN D3) 1000 units CAPS Take by mouth daily      • estradiol (ESTRACE) 0 1 mg/g vaginal cream Insert into the vagina     • fluticasone (FLONASE) 50 mcg/act nasal spray 1 SPRAY INTO EACH NOSTRIL DAILY 48 g 1   • Multiple Vitamins-Iron (MULTIVITAMIN/IRON PO) daily      • nirmatrelvir & ritonavir (Paxlovid, 300/100,) tablet therapy pack Take 3 tablets by mouth 2 (two) times a day for 5 days Take 2 nirmatrelvir tablets + 1 ritonavir tablet together per dose; do not take Atorvastatin while on this medication 30 tablet 0   • Omega-3 Fatty Acids (FISH OIL PO) Take 1,000 mg by mouth     • pseudoephedrine (SUDAFED) 30 mg tablet Take 30 mg by mouth every 4 (four) hours as needed     • estradiol (ESTRACE) 0 1 mg/g vaginal cream Insert into the vagina       No current facility-administered medications for this visit  Objective:    /84   Pulse 74   Temp 97 6 °F (36 4 °C)   Resp 16   Ht 5' 4" (1 626 m)   Wt 80 3 kg (177 lb)   SpO2 98%   BMI 30 38 kg/m²        Physical Exam  Vitals and nursing note reviewed  Constitutional:       Appearance: Normal appearance  She is well-developed  Cardiovascular:      Rate and Rhythm: Normal rate and regular rhythm  Pulses: Normal pulses  Heart sounds: Normal heart sounds  No murmur heard  Pulmonary:      Effort: Pulmonary effort is normal       Breath sounds: Normal breath sounds  Skin:     General: Skin is warm and dry  Neurological:      Mental Status: She is alert     Psychiatric:         Mood and Affect: Mood normal          Behavior: Behavior normal                 LEOPOLDO Patel

## 2022-12-19 NOTE — LETTER
December 19, 2022     Patient: Bessie Betancourt  YOB: 1955  Date of Visit: 12/19/2022      To Whom it May Concern:    Lynnette Lundy is under my professional care  Rebeca Cabello was seen in my office on 12/19/2022  Rebeca Cabello may return to work on 12/21/22  If you have any questions or concerns, please don't hesitate to call           Sincerely,          LEOPOLDO Barker        CC: No Recipients

## 2023-01-17 DIAGNOSIS — E78.2 MIXED HYPERLIPIDEMIA: ICD-10-CM

## 2023-01-17 RX ORDER — ATORVASTATIN CALCIUM 10 MG/1
TABLET, FILM COATED ORAL
Qty: 90 TABLET | Refills: 1 | Status: SHIPPED | OUTPATIENT
Start: 2023-01-17

## 2023-01-24 ENCOUNTER — RA CDI HCC (OUTPATIENT)
Dept: OTHER | Facility: HOSPITAL | Age: 68
End: 2023-01-24

## 2023-01-24 NOTE — PROGRESS NOTES
[facility]  coding opportunities       Chart reviewed, no opportunity found: CHART REVIEWED, NO OPPORTUNITY FOUND        Patients Insurance        Commercial Insurance: Joey Begum

## 2023-02-02 ENCOUNTER — OFFICE VISIT (OUTPATIENT)
Dept: FAMILY MEDICINE CLINIC | Facility: CLINIC | Age: 68
End: 2023-02-02

## 2023-02-02 VITALS
HEART RATE: 70 BPM | OXYGEN SATURATION: 96 % | SYSTOLIC BLOOD PRESSURE: 128 MMHG | TEMPERATURE: 97.2 F | BODY MASS INDEX: 29.53 KG/M2 | DIASTOLIC BLOOD PRESSURE: 74 MMHG | RESPIRATION RATE: 16 BRPM | WEIGHT: 173 LBS | HEIGHT: 64 IN

## 2023-02-02 DIAGNOSIS — E78.2 MIXED HYPERLIPIDEMIA: Primary | ICD-10-CM

## 2023-02-02 NOTE — ASSESSMENT & PLAN NOTE
Well controlled and will continue atorvastatin as ordered  Continued weight loss efforts were encouraged

## 2023-02-02 NOTE — PROGRESS NOTES
Name: Reynaldo Pimentel      : 1955      MRN: [id number]  Encounter Provider: Markus Braden MD  Encounter Date: 2023   Encounter department: [address] [place] [address]     1  Mixed hyperlipidemia  Assessment & Plan:  Well controlled and will continue atorvastatin as ordered  Continued weight loss efforts were encouraged  Orders:  -     CBC and differential; Future; Expected date: 2023  -     Comprehensive metabolic panel; Future; Expected date: 2023  -     Lipid panel; Future; Expected date: 2023  -     CBC and differential  -     Comprehensive metabolic panel  -     Lipid panel           Subjective      Here for follow up of hyperlipidemia  Feels well and denies chest pain or dyspnea  No side effects with medications  We reviewed labwork from November  Review of Systems   Constitutional: Negative  Respiratory: Negative  Cardiovascular: Negative  Current Outpatient Medications on File Prior to Visit   Medication Sig   • Ascorbic Acid (VITAMIN C) 100 MG tablet Take 100 mg by mouth daily   • atorvastatin (LIPITOR) 10 mg tablet TAKE 1 TABLET DAILY   • Cholecalciferol (VITAMIN D3) 1000 units CAPS Take by mouth daily    • estradiol (ESTRACE) 0 1 mg/g vaginal cream Insert into the vagina   • fluticasone (FLONASE) 50 mcg/act nasal spray 1 SPRAY INTO EACH NOSTRIL DAILY   • Multiple Vitamins-Iron (MULTIVITAMIN/IRON PO) daily    • Omega-3 Fatty Acids (FISH OIL PO) Take 1,000 mg by mouth   • pseudoephedrine (SUDAFED) 30 mg tablet Take 30 mg by mouth every 4 (four) hours as needed       Objective     /74   Pulse 70   Temp (!) 97 2 °F (36 2 °C)   Resp 16   Ht 5' 4" (1 626 m)   Wt 78 5 kg (173 lb)   SpO2 96%   BMI 29 70 kg/m²     Physical Exam  Constitutional:       Appearance: She is well-developed  Eyes:      Conjunctiva/sclera: Conjunctivae normal    Neck:      Thyroid: No thyromegaly  Vascular: No JVD     Cardiovascular:      Rate and Rhythm: Normal rate and regular rhythm  Heart sounds: Normal heart sounds  No murmur heard  No friction rub  No gallop  Pulmonary:      Effort: Pulmonary effort is normal       Breath sounds: Normal breath sounds  No wheezing or rales  Abdominal:      General: Bowel sounds are normal  There is no distension  Palpations: Abdomen is soft  Tenderness: There is no abdominal tenderness  Musculoskeletal:      Cervical back: Neck supple         Jercorina Bolanos MD

## 2023-02-08 ENCOUNTER — TELEPHONE (OUTPATIENT)
Dept: FAMILY MEDICINE CLINIC | Facility: CLINIC | Age: 68
End: 2023-02-08

## 2023-02-08 DIAGNOSIS — Z82.49 FAMILY HISTORY OF HEART DISEASE: Primary | ICD-10-CM

## 2023-02-08 NOTE — TELEPHONE ENCOUNTER
DR SULTANA [facility]    Patient would like an order placed in her chart for Dr Almendarez Dear Cardiologist

## 2023-02-08 NOTE — TELEPHONE ENCOUNTER
Spoke to patient, she stated she started having flashes in her eye, so she went to eye doctor  They told her the retina tried to detach, but it didn't so there is a lot of pressure behind the eye  She is concerned because both of her parents  at young ages from heart issues  She has an appointment in April with Cardiology    Chandni Mathis LPN

## 2023-02-10 ENCOUNTER — OFFICE VISIT (OUTPATIENT)
Dept: FAMILY MEDICINE CLINIC | Facility: CLINIC | Age: 68
End: 2023-02-10

## 2023-02-10 ENCOUNTER — HOSPITAL ENCOUNTER (OUTPATIENT)
Dept: CT IMAGING | Facility: HOSPITAL | Age: 68
Discharge: HOME | End: 2023-02-10

## 2023-02-10 ENCOUNTER — HOSPITAL ENCOUNTER (EMERGENCY)
Facility: HOSPITAL | Age: 68
Discharge: HOME | End: 2023-02-10
Attending: EMERGENCY MEDICINE

## 2023-02-10 ENCOUNTER — APPOINTMENT (EMERGENCY)
Dept: MRI IMAGING | Facility: HOSPITAL | Age: 68
End: 2023-02-10

## 2023-02-10 VITALS
DIASTOLIC BLOOD PRESSURE: 84 MMHG | HEIGHT: 64 IN | HEART RATE: 72 BPM | RESPIRATION RATE: 20 BRPM | WEIGHT: 175 LBS | TEMPERATURE: 97.4 F | BODY MASS INDEX: 29.88 KG/M2 | SYSTOLIC BLOOD PRESSURE: 162 MMHG

## 2023-02-10 VITALS
DIASTOLIC BLOOD PRESSURE: 60 MMHG | TEMPERATURE: 98 F | RESPIRATION RATE: 16 BRPM | SYSTOLIC BLOOD PRESSURE: 183 MMHG | OXYGEN SATURATION: 100 % | HEART RATE: 91 BPM

## 2023-02-10 DIAGNOSIS — E78.2 MIXED HYPERLIPIDEMIA: ICD-10-CM

## 2023-02-10 DIAGNOSIS — H53.9 VISION CHANGES: Primary | ICD-10-CM

## 2023-02-10 DIAGNOSIS — R51.9 PRESSURE IN HEAD: ICD-10-CM

## 2023-02-10 DIAGNOSIS — H53.9 VISION CHANGES: ICD-10-CM

## 2023-02-10 DIAGNOSIS — R53.1 LEFT-SIDED WEAKNESS: ICD-10-CM

## 2023-02-10 DIAGNOSIS — D42.9 MENINGIOMA, MULTIPLE (HCC): Primary | ICD-10-CM

## 2023-02-10 DIAGNOSIS — R03.0 BLOOD PRESSURE ELEVATED WITHOUT HISTORY OF HTN: ICD-10-CM

## 2023-02-10 LAB
ALBUMIN SERPL BCP-MCNC: 4.3 G/DL (ref 3.5–5)
ALP SERPL-CCNC: 91 U/L (ref 34–104)
ALT SERPL W P-5'-P-CCNC: 25 U/L (ref 7–52)
ANION GAP SERPL CALCULATED.3IONS-SCNC: 10 MMOL/L (ref 4–13)
AST SERPL W P-5'-P-CCNC: 24 U/L (ref 13–39)
BASOPHILS # BLD AUTO: 0.09 THOUSANDS/ÂΜL (ref 0–0.1)
BASOPHILS NFR BLD AUTO: 2 % (ref 0–1)
BILIRUB SERPL-MCNC: 0.7 MG/DL (ref 0.2–1)
BUN SERPL-MCNC: 8 MG/DL (ref 5–25)
CALCIUM SERPL-MCNC: 9.8 MG/DL (ref 8.4–10.2)
CHLORIDE SERPL-SCNC: 101 MMOL/L (ref 96–108)
CO2 SERPL-SCNC: 26 MMOL/L (ref 21–32)
CREAT SERPL-MCNC: 0.66 MG/DL (ref 0.6–1.3)
EOSINOPHIL # BLD AUTO: 0.42 THOUSAND/ÂΜL (ref 0–0.61)
EOSINOPHIL NFR BLD AUTO: 8 % (ref 0–6)
ERYTHROCYTE [DISTWIDTH] IN BLOOD BY AUTOMATED COUNT: 12.3 % (ref 11.6–15.1)
GFR SERPL CREATININE-BSD FRML MDRD: 92 ML/MIN/1.73SQ M
GLUCOSE SERPL-MCNC: 127 MG/DL (ref 65–140)
HCT VFR BLD AUTO: 42.3 % (ref 34.8–46.1)
HGB BLD-MCNC: 14.6 G/DL (ref 11.5–15.4)
IMM GRANULOCYTES # BLD AUTO: 0.02 THOUSAND/UL (ref 0–0.2)
IMM GRANULOCYTES NFR BLD AUTO: 0 % (ref 0–2)
LYMPHOCYTES # BLD AUTO: 1.03 THOUSANDS/ÂΜL (ref 0.6–4.47)
LYMPHOCYTES NFR BLD AUTO: 20 % (ref 14–44)
MCH RBC QN AUTO: 32.1 PG (ref 26.8–34.3)
MCHC RBC AUTO-ENTMCNC: 34.5 G/DL (ref 31.4–37.4)
MCV RBC AUTO: 93 FL (ref 82–98)
MONOCYTES # BLD AUTO: 0.42 THOUSAND/ÂΜL (ref 0.17–1.22)
MONOCYTES NFR BLD AUTO: 8 % (ref 4–12)
NEUTROPHILS # BLD AUTO: 3.11 THOUSANDS/ÂΜL (ref 1.85–7.62)
NEUTS SEG NFR BLD AUTO: 62 % (ref 43–75)
NRBC BLD AUTO-RTO: 0 /100 WBCS
PLATELET # BLD AUTO: 195 THOUSANDS/UL (ref 149–390)
PMV BLD AUTO: 11.4 FL (ref 8.9–12.7)
POTASSIUM SERPL-SCNC: 3.9 MMOL/L (ref 3.5–5.3)
PROT SERPL-MCNC: 7.6 G/DL (ref 6.4–8.4)
RBC # BLD AUTO: 4.55 MILLION/UL (ref 3.81–5.12)
SODIUM SERPL-SCNC: 137 MMOL/L (ref 135–147)
WBC # BLD AUTO: 5.09 THOUSAND/UL (ref 4.31–10.16)

## 2023-02-10 RX ORDER — LORAZEPAM 2 MG/ML
0.5 INJECTION INTRAMUSCULAR ONCE
Status: COMPLETED | OUTPATIENT
Start: 2023-02-10 | End: 2023-02-10

## 2023-02-10 RX ADMIN — GADOBUTROL 7 ML: 604.72 INJECTION INTRAVENOUS at 15:22

## 2023-02-10 RX ADMIN — LORAZEPAM 0.5 MG: 2 INJECTION INTRAMUSCULAR; INTRAVENOUS at 14:46

## 2023-02-10 NOTE — PROGRESS NOTES
Assessment/Plan:  EKG done in office and NSR without any acute changes but discussed with patient that EKG does not r/o ACS and if symptoms gets worse then will go to ER  Will do CT head to r/o acute etiology  Will stop taking sudafed and will follow back in office  In couple of days for BP check  1  Vision changes  -     CT head wo contrast; Future; Expected date: 02/10/2023    2  Left-sided weakness  -     CT head wo contrast; Future; Expected date: 02/10/2023  -     POCT ECG    3  Pressure in head  -     CT head wo contrast; Future; Expected date: 02/10/2023    4  Blood pressure elevated without history of HTN    5  Mixed hyperlipidemia  Comments:  complaint on statin and tolerating it well          Patient Instructions:  Advised to go to ER for any worsening of symptoms  Return if symptoms worsen or fail to improve  Future Appointments   Date Time Provider Freeman Coreas   2/15/2023  2:15 PM Macey Downing MD [facility]   4/19/2023  2:40 PM Manasa Marcelo MD [facility]   8/3/2023  5:15 PM Macey Downing MD [facility]           Subjective:      Patient ID: Nadja Mcgraw is a 74 y o  female  Chief Complaint   Patient presents with   • Headache   • Eye Pain     Started one week ago with episodes of "lightning flashes" left eye  Saw her eye doctor and was told her retina tried to detach but it did not  Now, she has pain in left head, left arm and left hip [id number]   • Sinus Problem     Sinus symptoms started yesterday am         Vitals:  /84   Pulse 72   Temp (!) 97 4 °F (36 3 °C)   Resp 20   Ht 5' 4" (1 626 m)   Wt 79 4 kg (175 lb)   BMI 30 04 kg/m²     HPI   Started with flashing ligthe on 2/3/2023 and then 2 days later went to ophthalmologist on 2/6/2023 and was told might have some trauma but no retinal detachment at this time and will be following back with them on 2/20/2023   Stated that also feeling some pressure on left side of face and feels aching in left arm and left leg and some pressure  Also having some sinus pressure and congestion too  BP elevated in office and stated that taking sudafed from couple of days  Was in office couple of days ago and BP was fine  Was advised by her ophthalmologist to follow up with her PCP  Also having lot of personal and professional stress  Informed with patient that if her insurance refuses CT head STAT then she might be responsible for bill and patient verbalizes understanding       The following portions of the patient's history were reviewed and updated as appropriate: allergies, current medications, past family history, past medical history, past social history, past surgical history and problem list       Review of Systems   Constitutional: Negative for chills, diaphoresis, fatigue, fever and unexpected weight change  HENT: Positive for sinus pressure  Negative for congestion, dental problem, drooling, ear discharge, ear pain, facial swelling, hearing loss, mouth sores, nosebleeds, postnasal drip, rhinorrhea, sinus pain, sneezing, sore throat, tinnitus, trouble swallowing and voice change  Respiratory: Negative for cough, chest tightness, shortness of breath and wheezing  Cardiovascular: Negative  Gastrointestinal: Negative for abdominal pain, constipation, diarrhea, nausea and vomiting  Genitourinary: Negative  Musculoskeletal:        As noted in HPI'   Skin: Negative  Neurological: Negative for dizziness, light-headedness and headaches  As noted in HPI'     Psychiatric/Behavioral: The patient is nervous/anxious  As noted in HPI           Objective:    Social History     Tobacco Use   Smoking Status Former   • Packs/day: 1 50   • Years: 30 00   • Pack years: 45 00   • Types: Cigarettes   • Quit date: 2008   • Years since quitting: 15 1   Smokeless Tobacco Never       Allergies:    Allergies   Allergen Reactions   • Monascus Purpureus Went Yeast Other (See Comments)   • Sulfa Antibiotics Edema   • Other Rash   • Penicillins Rash         Current Outpatient Medications   Medication Sig Dispense Refill   • Ascorbic Acid (VITAMIN C) 100 MG tablet Take 100 mg by mouth daily     • atorvastatin (LIPITOR) 10 mg tablet TAKE 1 TABLET DAILY 90 tablet 1   • Cholecalciferol (VITAMIN D3) 1000 units CAPS Take by mouth daily      • estradiol (ESTRACE) 0 1 mg/g vaginal cream Insert into the vagina     • fluticasone (FLONASE) 50 mcg/act nasal spray 1 SPRAY INTO EACH NOSTRIL DAILY 48 g 1   • Multiple Vitamins-Iron (MULTIVITAMIN/IRON PO) daily      • Omega-3 Fatty Acids (FISH OIL PO) Take 1,000 mg by mouth     • pseudoephedrine (SUDAFED) 30 mg tablet Take 30 mg by mouth every 4 (four) hours as needed       No current facility-administered medications for this visit  Physical Exam  Constitutional:       Appearance: Normal appearance  HENT:      Head: Normocephalic and atraumatic  Nose: Nose normal    Eyes:      Conjunctiva/sclera: Conjunctivae normal    Cardiovascular:      Rate and Rhythm: Normal rate and regular rhythm  Pulses: Normal pulses  Heart sounds: Normal heart sounds  Pulmonary:      Effort: Pulmonary effort is normal       Breath sounds: Normal breath sounds  Skin:     General: Skin is warm and dry  Findings: No rash  Neurological:      General: No focal deficit present  Mental Status: She is alert and oriented to person, place, and time  GCS: GCS eye subscore is 4  GCS verbal subscore is 5  GCS motor subscore is 6  Cranial Nerves: No facial asymmetry  Motor: No weakness  Coordination: Coordination normal       Gait: Gait normal    Psychiatric:         Mood and Affect: Mood normal          Behavior: Behavior normal          Thought Content:  Thought content normal          Judgment: Judgment normal                      LEOPOLDO Yan

## 2023-02-10 NOTE — ED PROVIDER NOTES
History  Chief Complaint   Patient presents with   • Evaluation of Abnormal Diagnostic Test     Pt presents to ED from home after having a CT w/ abnormal results and sent by doctor for MRI  Past Medical History:Anxiety, Cellulitis, Chronic rhinitis, GERD without esophagitis, Hyperlipidemia, Hypometabolism, Vitamin D deficiency   PSH: colonscopy    Pt referred to ED after abnormal outpt CT head  Pt reports 1 week of sx that started with intermittent flashing lights to left eye, that has since resolved; seen by ophthalmologist on 2023, was told "might have some trauma but no retinal detachment at this time and will be following back with them on 2023"  Pt states she then started to feel some pressure on left side of face/sinus pressure/congestion and aching in left arm-has been taking Sudafed for couple of days  Pt seen by PCP today, ordered outpt CT and then was referred to ED  w/ abnormal results and MRI  Also having lot of personal and professional stress                   Prior to Admission Medications   Prescriptions Last Dose Informant Patient Reported? Taking?    Ascorbic Acid (VITAMIN C) 100 MG tablet   Yes No   Sig: Take 100 mg by mouth daily   Cholecalciferol (VITAMIN D3) 1000 units CAPS   Yes No   Sig: Take by mouth daily    Multiple Vitamins-Iron (MULTIVITAMIN/IRON PO)   Yes No   Sig: daily    Omega-3 Fatty Acids (FISH OIL PO)   Yes No   Sig: Take 1,000 mg by mouth   atorvastatin (LIPITOR) 10 mg tablet   No No   Sig: TAKE 1 TABLET DAILY   estradiol (ESTRACE) 0 1 mg/g vaginal cream   Yes No   Sig: Insert into the vagina   fluticasone (FLONASE) 50 mcg/act nasal spray   No No   Si SPRAY INTO EACH NOSTRIL DAILY   pseudoephedrine (SUDAFED) 30 mg tablet   Yes No   Sig: Take 30 mg by mouth every 4 (four) hours as needed      Facility-Administered Medications: None       Past Medical History:   Diagnosis Date   • Anxiety    • Cellulitis    • Chronic rhinitis     Last Assessed:2016   • GERD without esophagitis     Last Assessed:6/6/2016   • Hyperlipidemia    • Hypometabolism    • Vitamin D deficiency        Past Surgical History:   Procedure Laterality Date   • COLONOSCOPY         Family History   Problem Relation Age of Onset   • Atrial fibrillation Mother    • Coronary artery disease Mother    • Atrial fibrillation Father    • Hypertension Father    • Breast cancer Maternal Grandmother 61     I have reviewed and agree with the history as documented  E-Cigarette/Vaping   • E-Cigarette Use Never User      E-Cigarette/Vaping Substances     Social History     Tobacco Use   • Smoking status: Former     Packs/day: 1 50     Years: 30 00     Pack years: 45 00     Types: Cigarettes     Quit date: 2008     Years since quitting: 15 1   • Smokeless tobacco: Never   Vaping Use   • Vaping Use: Never used   Substance Use Topics   • Alcohol use: Yes     Comment: social       Review of Systems   Constitutional: Negative for fever  HENT: Positive for congestion, facial swelling, rhinorrhea, sinus pressure and sinus pain  Negative for nosebleeds, sore throat and trouble swallowing  Eyes: Positive for visual disturbance  Respiratory: Positive for shortness of breath  Gastrointestinal: Negative for diarrhea and vomiting  Genitourinary: Negative for dysuria and hematuria  Musculoskeletal: Negative for myalgias  Skin: Negative for wound  Neurological: Positive for headaches  Negative for facial asymmetry, speech difficulty, weakness and light-headedness  All other systems reviewed and are negative  Physical Exam  Physical Exam  Vitals and nursing note reviewed  Constitutional:       General: She is not in acute distress  Appearance: Normal appearance  She is well-developed  HENT:      Head: Normocephalic and atraumatic        Right Ear: External ear normal       Left Ear: External ear normal       Nose: Nose normal       Mouth/Throat:      Mouth: Mucous membranes are moist       Pharynx: Oropharynx is clear  No oropharyngeal exudate or posterior oropharyngeal erythema  Eyes:      Conjunctiva/sclera: Conjunctivae normal       Pupils: Pupils are equal, round, and reactive to light  Cardiovascular:      Rate and Rhythm: Normal rate  Pulmonary:      Effort: Pulmonary effort is normal  No respiratory distress  Breath sounds: Normal breath sounds  Musculoskeletal:         General: Normal range of motion  Cervical back: Normal range of motion  No tenderness  Right lower leg: No edema  Left lower leg: No edema  Lymphadenopathy:      Cervical: No cervical adenopathy  Skin:     General: Skin is warm and dry  Capillary Refill: Capillary refill takes less than 2 seconds  Findings: No erythema, lesion or rash  Neurological:      General: No focal deficit present  Mental Status: She is alert and oriented to person, place, and time  Cranial Nerves: No cranial nerve deficit  Motor: No weakness        Gait: Gait normal    Psychiatric:         Mood and Affect: Mood normal          Behavior: Behavior normal          Vital Signs  ED Triage Vitals   Temperature Pulse Respirations Blood Pressure SpO2   02/10/23 1414 02/10/23 1243 02/10/23 1243 02/10/23 1245 02/10/23 1243   98 °F (36 7 °C) 91 16 (!) 183/60 100 %      Temp Source Heart Rate Source Patient Position - Orthostatic VS BP Location FiO2 (%)   02/10/23 1414 -- -- -- --   Oral          Pain Score       --                  Vitals:    02/10/23 1243 02/10/23 1245   BP:  (!) 183/60   Pulse: 91          Visual Acuity      ED Medications  Medications   LORazepam (ATIVAN) injection 0 5 mg (0 5 mg Intravenous Given 2/10/23 1446)   Gadobutrol injection (SINGLE-DOSE) SOLN 7 mL (7 mL Intravenous Given 2/10/23 1522)       Diagnostic Studies  Results Reviewed     Procedure Component Value Units Date/Time    Comprehensive metabolic panel [[id number]] Collected: 02/10/23 1257    Lab Status: Final result Specimen: Blood from Arm, Left Updated: 02/10/23 1321     Sodium 137 mmol/L      Potassium 3 9 mmol/L      Chloride 101 mmol/L      CO2 26 mmol/L      ANION GAP 10 mmol/L      BUN 8 mg/dL      Creatinine 0 66 mg/dL      Glucose 127 mg/dL      Calcium 9 8 mg/dL      AST 24 U/L      ALT 25 U/L      Alkaline Phosphatase 91 U/L      Total Protein 7 6 g/dL      Albumin 4 3 g/dL      Total Bilirubin 0 70 mg/dL      eGFR 92 ml/min/1 73sq m     Narrative:      National Kidney Disease Foundation guidelines for Chronic Kidney Disease (CKD):   •  Stage 1 with normal or high GFR (GFR > 90 mL/min/1 73 square meters)  •  Stage 2 Mild CKD (GFR = 60-89 mL/min/1 73 square meters)  •  Stage 3A Moderate CKD (GFR = 45-59 mL/min/1 73 square meters)  •  Stage 3B Moderate CKD (GFR = 30-44 mL/min/1 73 square meters)  •  Stage 4 Severe CKD (GFR = 15-29 mL/min/1 73 square meters)  •  Stage 5 End Stage CKD (GFR <15 mL/min/1 73 square meters)  Note: GFR calculation is accurate only with a steady state creatinine    CBC and differential [[id number]]  (Abnormal) Collected: 02/10/23 1257    Lab Status: Final result Specimen: Blood from Arm, Left Updated: 02/10/23 1301     WBC 5 09 Thousand/uL      RBC 4 55 Million/uL      Hemoglobin 14 6 g/dL      Hematocrit 42 3 %      MCV 93 fL      MCH 32 1 pg      MCHC 34 5 g/dL      RDW 12 3 %      MPV 11 4 fL      Platelets 797 Thousands/uL      nRBC 0 /100 WBCs      Neutrophils Relative 62 %      Immat GRANS % 0 %      Lymphocytes Relative 20 %      Monocytes Relative 8 %      Eosinophils Relative 8 %      Basophils Relative 2 %      Neutrophils Absolute 3 11 Thousands/µL      Immature Grans Absolute 0 02 Thousand/uL      Lymphocytes Absolute 1 03 Thousands/µL      Monocytes Absolute 0 42 Thousand/µL      Eosinophils Absolute 0 42 Thousand/µL      Basophils Absolute 0 09 Thousands/µL                  MRI brain w wo contrast   Final Result by Abhay Painting MD (02/10 9915)   Redemonstration of 2 cm left frontal extra-axial lesion favored to represent meningioma  2 additional left frontal dural-based lesions measuring 5 mm also favored to represent meningiomas  No significant mass effect or edema  No acute intracranial pathology  Workstation performed: [id number]                    Procedures  Procedures         ED Course                  Stroke Assessment     Row Name 02/10/23 1522             NIH Stroke Scale    Interval Baseline      Level of Consciousness (1a ) 0      LOC Questions (1b ) 0      LOC Commands (1c ) 0      Best Gaze (2 ) 0      Visual (3 ) 0      Facial Palsy (4 ) 0      Motor Arm, Left (5a ) 0      Motor Arm, Right (5b ) 0      Motor Leg, Left (6a ) 0      Motor Leg, Right (6b ) 0      Limb Ataxia (7 ) 0      Sensory (8 ) 0      Best Language (9 ) 0      Dysarthria (10 ) 0      Extinction and Inattention (11 ) (Formerly Neglect) 0      Total 0                            SBIRT 20yo+    Flowsheet Row Most Recent Value   SBIRT (25 yo +)    In order to provide better care to our patients, we are screening all of our patients for alcohol and drug use  Would it be okay to ask you these screening questions? Yes Filed at: 02/10/2023 1251   Initial Alcohol Screen: US AUDIT-C     1  How often do you have a drink containing alcohol? 0 Filed at: 02/10/2023 1251   2  How many drinks containing alcohol do you have on a typical day you are drinking? 0 Filed at: 02/10/2023 1251   3a  Male UNDER 65: How often do you have five or more drinks on one occasion? 0 Filed at: 02/10/2023 1251   3b  FEMALE Any Age, or MALE 65+: How often do you have 4 or more drinks on one occassion? 0 Filed at: 02/10/2023 1251   Audit-C Score 0 Filed at: 02/10/2023 1251   DELORIS: How many times in the past year have you    Used an illegal drug or used a prescription medication for non-medical reasons?  Never Filed at: 02/10/2023 1251                    Medical Decision Making  Patient with left visual changes that have since resolved and the left-sided facial fullness, heaviness, aching into the left arm, ekg done in office, normal; abnormal outpatient CT, sent for MRI  Spoke with radiologist MRI approved  MRI shows meningioma, 2cm in greatest size, patient stable for outpatient follow-up with neurosurgery  Explained results to patient and sister; went over results, questions answered    Amount and/or Complexity of Data Reviewed  External Data Reviewed: radiology  Details:  CT head done today IMPRESSION: No mass effect, acute intracranial hemorrhage or CT evidence for a large acute vascular distribution infarct  Left frontal convexity extra-axial soft tissue effacing the CSF space is suspicious for the presence of a meningioma  Correlation with contrast-enhanced MRI of the brain is recommended for further evaluation      The study was marked in EPIC for immediate notification      Labs: ordered  Details: unremarkable  Radiology: ordered  Risk  Prescription drug management  Disposition  Final diagnoses:   Meningioma, multiple ([facility] [address] )     Time reflects when diagnosis was documented in both MDM as applicable and the Disposition within this note     Time User Action Codes Description Comment    2/10/2023  4:04 PM Tristin Romero Add [D42 9] Meningioma, multiple [facility])       ED Disposition     ED Disposition   Discharge    Condition   Stable    Date/Time   Fri Feb 10, 2023  4:03 PM    Comment   Bernice Dowell discharge to home/self care  Follow-up Information     Follow up With Specialties Details Why Contact Info    Zee Medina MD Internal Medicine, Family Medicine   [address] [facility] [address] [phone]      Tequila Rodrigues MD Neurosurgery   [address] [place] [id number]  [phone]            Discharge Medication List as of 2/10/2023  4:06 PM      CONTINUE these medications which have NOT CHANGED    Details   Ascorbic Acid (VITAMIN C) 100 MG tablet Take 100 mg by mouth daily, Historical Med      atorvastatin (LIPITOR) 10 mg tablet TAKE 1 TABLET DAILY, Normal      Cholecalciferol (VITAMIN D3) 1000 units CAPS Take by mouth daily , Historical Med      estradiol (ESTRACE) 0 1 mg/g vaginal cream Insert into the vagina, Starting Tue 12/19/2017, Historical Med      fluticasone (FLONASE) 50 mcg/act nasal spray 1 SPRAY INTO EACH NOSTRIL DAILY, Starting Mon 12/5/2022, Normal      Multiple Vitamins-Iron (MULTIVITAMIN/IRON PO) daily , Starting Mon 3/1/2010, Historical Med      Omega-3 Fatty Acids (FISH OIL PO) Take 1,000 mg by mouth, Starting Mon 3/1/2010, Historical Med      pseudoephedrine (SUDAFED) 30 mg tablet Take 30 mg by mouth every 4 (four) hours as needed, Historical Med             No discharge procedures on file      PDMP Review     None          ED Provider  Electronically Signed by           Racquel Deal PA-C  02/10/23 9192

## 2023-02-13 ENCOUNTER — TELEPHONE (OUTPATIENT)
Dept: FAMILY MEDICINE CLINIC | Facility: CLINIC | Age: 68
End: 2023-02-13

## 2023-02-13 DIAGNOSIS — D42.9 MULTIPLE MENINGIOMA (HCC): Primary | ICD-10-CM

## 2023-02-13 NOTE — TELEPHONE ENCOUNTER
Patient called and discussed and will be following with neurosurgery  I personally called neurosurgery office to schedule her with early appt and office will call her   Eddie Dakin, CRNP

## 2023-02-15 ENCOUNTER — OFFICE VISIT (OUTPATIENT)
Dept: FAMILY MEDICINE CLINIC | Facility: CLINIC | Age: 68
End: 2023-02-15

## 2023-02-15 VITALS
DIASTOLIC BLOOD PRESSURE: 70 MMHG | WEIGHT: 173 LBS | TEMPERATURE: 97.8 F | HEIGHT: 64 IN | SYSTOLIC BLOOD PRESSURE: 120 MMHG | BODY MASS INDEX: 29.53 KG/M2 | OXYGEN SATURATION: 99 % | RESPIRATION RATE: 16 BRPM | HEART RATE: 67 BPM

## 2023-02-15 DIAGNOSIS — D32.9 MENINGIOMA (HCC): Primary | ICD-10-CM

## 2023-02-15 NOTE — ASSESSMENT & PLAN NOTE
She is not currently having symptoms and will discuss with neurosurgery tomorrow at her appointment  Asked patient to call sooner than next scheduled appointment for any complaints or issues

## 2023-02-15 NOTE — PROGRESS NOTES
Name: Yanet Andrews      : 1955      MRN: [id number]  Encounter Provider: Magdaleno Prather MD  Encounter Date: 2/15/2023   Encounter department: [address]     1  [facility])  Assessment & Plan:  She is not currently having symptoms and will discuss with neurosurgery tomorrow at her appointment  Asked patient to call sooner than next scheduled appointment for any complaints or issues  Subjective      She started to have flashing in L eye and later come in to see NP  Thought it was an optic migraine, but was missing vision  Saw ophtho but no retinal detachment  NP here ordered CT here and patient was sent to ER for multiple mengiomas  She has initial evaluation with neurosurgery tomorrow  Review of Systems   Constitutional: Negative  Respiratory: Negative  Cardiovascular: Negative  Neurological: Negative  Current Outpatient Medications on File Prior to Visit   Medication Sig   • Ascorbic Acid (VITAMIN C) 100 MG tablet Take 100 mg by mouth daily   • atorvastatin (LIPITOR) 10 mg tablet TAKE 1 TABLET DAILY   • Cholecalciferol (VITAMIN D3) 1000 units CAPS Take by mouth daily    • estradiol (ESTRACE) 0 1 mg/g vaginal cream Insert into the vagina   • fluticasone (FLONASE) 50 mcg/act nasal spray 1 SPRAY INTO EACH NOSTRIL DAILY   • Multiple Vitamins-Iron (MULTIVITAMIN/IRON PO) daily    • Omega-3 Fatty Acids (FISH OIL PO) Take 1,000 mg by mouth   • pseudoephedrine (SUDAFED) 30 mg tablet Take 30 mg by mouth every 4 (four) hours as needed       Objective     /70   Pulse 67   Temp 97 8 °F (36 6 °C)   Resp 16   Ht 5' 4" (1 626 m)   Wt 78 5 kg (173 lb)   SpO2 99%   BMI 29 70 kg/m²     Physical Exam  Constitutional:       Appearance: She is well-developed  Eyes:      Conjunctiva/sclera: Conjunctivae normal    Neck:      Thyroid: No thyromegaly  Vascular: No JVD     Cardiovascular:      Rate and Rhythm: Normal rate and regular rhythm  Heart sounds: Normal heart sounds  No murmur heard  No friction rub  No gallop  Pulmonary:      Effort: Pulmonary effort is normal       Breath sounds: Normal breath sounds  No wheezing or rales  Abdominal:      General: Bowel sounds are normal  There is no distension  Palpations: Abdomen is soft  Tenderness: There is no abdominal tenderness  Musculoskeletal:      Cervical back: Neck supple         Yenifer Jo MD

## 2023-02-16 ENCOUNTER — CONSULT (OUTPATIENT)
Dept: NEUROSURGERY | Facility: CLINIC | Age: 68
End: 2023-02-16

## 2023-02-16 VITALS
TEMPERATURE: 97.7 F | BODY MASS INDEX: 29.53 KG/M2 | WEIGHT: 173 LBS | HEIGHT: 64 IN | SYSTOLIC BLOOD PRESSURE: 149 MMHG | DIASTOLIC BLOOD PRESSURE: 77 MMHG | HEART RATE: 60 BPM | RESPIRATION RATE: 16 BRPM

## 2023-02-16 DIAGNOSIS — F40.240 CLAUSTROPHOBIA: Primary | ICD-10-CM

## 2023-02-16 DIAGNOSIS — D42.9 MULTIPLE MENINGIOMA (HCC): ICD-10-CM

## 2023-02-16 RX ORDER — ALPRAZOLAM 0.5 MG/1
TABLET ORAL
Qty: 4 TABLET | Refills: 0 | Status: SHIPPED | OUTPATIENT
Start: 2023-02-16

## 2023-02-16 NOTE — PROGRESS NOTES
DISCUSSION SUMMARY  This is a 74 y o  female complains of left facial numbness and left upper extremity pain and numbness as well as left lower extremity pain and numbness  These are not referable to the intracranial meningiomas  Given the symptoms and the position sense loss on examination an MRI of the cervical spine is indicated  An MRI of the brain will be important as a follow-up study  Return in about 6 months (around 8/16/2023) for follow-up after test, we will call with the results of the MRI of the cervical spine  Diagnosis ICD-10-CM Associated Orders   1  Multiple meningioma ([address] )  D42 9 Ambulatory Referral to Neurosurgery     MRI cervical spine with and without contrast     MRI brain with and without contrast           Chief Complaint   Patient presents with   • Consult     Consult (referred by PCP to [facility]) for multiple brain meningiomas with MRI brain 2/10/23 SL         HPI     Referred by LEOPOLDO Ortiz (Dept  [facility]) on 02/10/2023 for Multiple meningioma  02/06/2023, patient went to see the eye doctor and was told might have some trauma but no retinal detachment at this time and will be following back with them on 2/20/2023  Patient also reported feeling some pressure on left side of face and feels aching in left arm and left leg and some pressure  Also having some sinus pressure and congestion too  Was advised by her ophthalmologist to follow up with her PCP      02/10/2023, saw PCP who ordered CT head for further investigation  02/10/2023 CT head wo contrast: No mass effect, acute intracranial hemorrhage or CT evidence for a large acute vascular distribution infarct   Left frontal convexity extra-axial soft tissue effacing the CSF space is suspicious for the presence of a meningioma   Correlation with contrast-enhanced MRI of the brain is recommended for further evaluation       02/10/2023, patient presented to [facility] ED after having her CT with abnormal findings to get an MRI brain per PCP request      02/10/2023 MRI brain w wo contrast: Redemonstration of 2 cm left frontal extra-axial lesion favored to represent meningioma  2 additional left frontal dural-based lesions measuring 5 mm also favored to represent meningiomas  No significant mass effect or edema  No acute intracranial pathology  Patient report left-sided facial pain which radiates to the left upper extremity she was concerned that the meningioma was the cause of this  Her visual findings of floaters and flashes of light were investigated by her ophthalmologist and this led to her family doctor ordering an MRI of the brain  This demonstrated meningiomas  Review of Systems   Constitutional: Negative  HENT: Positive for congestion  Eyes: Positive for visual disturbance (light flashes in her left eye at night; has been having floaters in her left eye as well which was not present before)  Respiratory: Negative  Cardiovascular: Negative  Gastrointestinal: Negative  Endocrine: Negative  Genitourinary: Negative  Musculoskeletal: Positive for neck stiffness  Skin: Negative  Allergic/Immunologic: Negative  Neurological:        Left-sided facial ache that radiates down to the left UE   Hematological: Negative  Psychiatric/Behavioral: Negative  All other systems reviewed and are negative  I reviewed the ROS      Vitals:    /77 (BP Location: Right arm, Patient Position: Sitting, Cuff Size: Adult)   Pulse 60   Temp 97 7 °F (36 5 °C) (Temporal)   Resp 16   Ht 5' 4" (1 626 m)   Wt 78 5 kg (173 lb)   BMI 29 70 kg/m²     MEDICAL HISTORY  Past Medical History:   Diagnosis Date   • Anxiety    • Cellulitis    • Chronic rhinitis     Last Assessed:6/6/2016   • GERD without esophagitis     Last Assessed:6/6/2016   • Hyperlipidemia    • Hypometabolism    • Vitamin D deficiency      Past Surgical History:   Procedure Laterality Date   • COLONOSCOPY       Social History Tobacco Use   • Smoking status: Former     Packs/day: 1 50     Years: 30 00     Pack years: 45 00     Types: Cigarettes     Quit date: 2008     Years since quitting: 15 1   • Smokeless tobacco: Never   Vaping Use   • Vaping Use: Never used   Substance Use Topics   • Alcohol use: Yes     Comment: social      Family History   Problem Relation Age of Onset   • Atrial fibrillation Mother    • Coronary artery disease Mother    • Atrial fibrillation Father    • Hypertension Father    • Breast cancer Maternal Grandmother 61        Current Outpatient Medications:   •  Ascorbic Acid (VITAMIN C) 100 MG tablet, Take 100 mg by mouth daily, Disp: , Rfl:   •  atorvastatin (LIPITOR) 10 mg tablet, TAKE 1 TABLET DAILY, Disp: 90 tablet, Rfl: 1  •  Cholecalciferol (VITAMIN D3) 1000 units CAPS, Take by mouth daily , Disp: , Rfl:   •  estradiol (ESTRACE) 0 1 mg/g vaginal cream, Insert into the vagina, Disp: , Rfl:   •  fluticasone (FLONASE) 50 mcg/act nasal spray, 1 SPRAY INTO EACH NOSTRIL DAILY, Disp: 48 g, Rfl: 1  •  Multiple Vitamins-Iron (MULTIVITAMIN/IRON PO), daily , Disp: , Rfl:   •  Omega-3 Fatty Acids (FISH OIL PO), Take 1,000 mg by mouth, Disp: , Rfl:   •  pseudoephedrine (SUDAFED) 30 mg tablet, Take 30 mg by mouth every 4 (four) hours as needed, Disp: , Rfl:      Allergies   Allergen Reactions   • Monascus Purpureus Went Yeast Other (See Comments)   • Sulfa Antibiotics Edema   • Other Rash   • Penicillins Rash        The following portions of the patient's history were updated by MA and reviewed by MD: allergies, current medications, past family history, past medical history, past social history, past surgical history and problem list     Physical Exam  Vitals and nursing note reviewed  Constitutional:       General: She is not in acute distress  Appearance: Normal appearance  She is not ill-appearing, toxic-appearing or diaphoretic  HENT:      Head: Normocephalic        Nose: Nose normal    Eyes:      Extraocular Movements: Extraocular movements intact  Pupils: Pupils are equal, round, and reactive to light  Musculoskeletal:         General: No swelling, tenderness, deformity or signs of injury  Normal range of motion  Cervical back: Normal range of motion and neck supple  No rigidity  Right lower leg: No edema  Left lower leg: No edema  Lymphadenopathy:      Cervical: No cervical adenopathy  Skin:     General: Skin is warm and dry  Coloration: Skin is not jaundiced  Findings: No erythema or rash  Neurological:      Mental Status: She is alert and oriented to person, place, and time  Cranial Nerves: No cranial nerve deficit  Sensory: Sensory deficit Harman testing demonstrated a position sense deficit on the right as compared to the left) present  Motor: No weakness  Coordination: Coordination normal       Gait: Gait normal       Deep Tendon Reflexes: Reflexes normal    Psychiatric:         Mood and Affect: Mood normal          Behavior: Behavior normal          Thought Content: Thought content normal          Judgment: Judgment normal          RESULTS/DATA  I have personally reviewed pertinent films in PACS   MRI of the brain is carefully reviewed  This demonstrates a left frontal convexity meningioma which does not cause any reaction of the underlying brain and is located very far anteriorly  This is very distant from the motor strip and sensory strip   2 other small meningiomas are are also in the left frontal pole    None of these meningiomas have anything to do with the patient's symptomatology

## 2023-02-16 NOTE — TELEPHONE ENCOUNTER
Patient requested pre-medication for MRI 3/13/2023 and 08/16/2023  Placed order to be eRx to primary pharmacy

## 2023-03-13 ENCOUNTER — HOSPITAL ENCOUNTER (OUTPATIENT)
Dept: MRI IMAGING | Facility: HOSPITAL | Age: 68
Discharge: HOME | End: 2023-03-13
Attending: NEUROLOGICAL SURGERY

## 2023-03-13 DIAGNOSIS — D42.9 MULTIPLE MENINGIOMA (HCC): ICD-10-CM

## 2023-03-13 RX ADMIN — GADOBUTROL 7 ML: 604.72 INJECTION INTRAVENOUS at 13:24

## 2023-03-28 ENCOUNTER — OFFICE VISIT (OUTPATIENT)
Dept: NEUROSURGERY | Facility: CLINIC | Age: 68
End: 2023-03-28

## 2023-03-28 VITALS
SYSTOLIC BLOOD PRESSURE: 139 MMHG | WEIGHT: 171 LBS | TEMPERATURE: 97.3 F | RESPIRATION RATE: 16 BRPM | HEIGHT: 64 IN | HEART RATE: 66 BPM | BODY MASS INDEX: 29.19 KG/M2 | DIASTOLIC BLOOD PRESSURE: 73 MMHG

## 2023-03-28 DIAGNOSIS — D42.9 MULTIPLE MENINGIOMA (HCC): Primary | ICD-10-CM

## 2023-03-28 NOTE — PROGRESS NOTES
"DISCUSSION SUMMARY   This is a 74 y o  female who was recently identified to have a meningiomas  She complained of facial numbness and neck pain and because of this an MRI of the cervical spine was ordered to guard against the possibility of a meningioma in the cervical spine - this is negative  Return for Follow-up as scheduled  Diagnosis ICD-10-CM Associated Orders   1  Multiple meningioma ([facility] [address] )  D42 9            Chief Complaint   F/u after MRI Cspine 3/13/23       History of Present Illness   Patient presented with complains of left facial numbness and left upper extremity pain and numbness as well as left lower extremity pain and numbness   These are not referable to the intracranial meningiomas   Given the symptoms and the position sense loss on examination an MRI of the cervical spine is indicated   An MRI of the brain will be important as a follow-up study  Patient to follow up after MRI cervical spine first and routine follow up in Aug  2023 after MRI brain  Today, patient states that all her previous symptoms mentioned at the last visit have subsided and she presents with no complaints today  Her symptoms previously present are now gone  Review of Systems   Constitutional: Negative  HENT: Negative  Eyes: Negative  Respiratory: Negative  Cardiovascular: Negative  Gastrointestinal: Negative  Endocrine: Negative  Genitourinary: Negative  Musculoskeletal: Negative  Skin: Negative  Allergic/Immunologic: Negative  Neurological: Negative  Hematological: Negative  Psychiatric/Behavioral: Negative  All other systems reviewed and are negative    I reviewed the ROS    Vitals:    /73 (BP Location: Right arm, Patient Position: Sitting, Cuff Size: Standard)   Pulse 66   Temp (!) 97 3 °F (36 3 °C) (Temporal)   Resp 16   Ht 5' 4\" (1 626 m)   Wt 77 6 kg (171 lb)   BMI 29 35 kg/m²     MEDICAL HISTORY  Past Medical History:   Diagnosis Date   • Anxiety  " • Cellulitis    • Chronic rhinitis     Last Assessed:6/6/2016   • GERD without esophagitis     Last Assessed:6/6/2016   • Hyperlipidemia    • Hypometabolism    • Vitamin D deficiency      Past Surgical History:   Procedure Laterality Date   • COLONOSCOPY       Social History     Tobacco Use   • Smoking status: Former     Packs/day: 1 50     Years: 30 00     Pack years: 45 00     Types: Cigarettes     Quit date: 2008     Years since quitting: 15 2   • Smokeless tobacco: Never   Vaping Use   • Vaping Use: Never used   Substance Use Topics   • Alcohol use: Yes     Comment: social      Family History   Problem Relation Age of Onset   • Atrial fibrillation Mother    • Coronary artery disease Mother    • Atrial fibrillation Father    • Hypertension Father    • Breast cancer Maternal Grandmother 60        Current Outpatient Medications:   •  ALPRAZolam (XANAX) 0 5 mg tablet, Take 1 tab 2 hours prior to MRI  May take another tab 30 minutes prior if needed  , Disp: 4 tablet, Rfl: 0  •  Ascorbic Acid (VITAMIN C) 100 MG tablet, Take 100 mg by mouth daily, Disp: , Rfl:   •  atorvastatin (LIPITOR) 10 mg tablet, TAKE 1 TABLET DAILY, Disp: 90 tablet, Rfl: 1  •  Cholecalciferol (VITAMIN D3) 1000 units CAPS, Take by mouth daily , Disp: , Rfl:   •  estradiol (ESTRACE) 0 1 mg/g vaginal cream, Insert into the vagina, Disp: , Rfl:   •  fluticasone (FLONASE) 50 mcg/act nasal spray, 1 SPRAY INTO EACH NOSTRIL DAILY, Disp: 48 g, Rfl: 1  •  Multiple Vitamins-Iron (MULTIVITAMIN/IRON PO), daily , Disp: , Rfl:   •  Omega-3 Fatty Acids (FISH OIL PO), Take 1,000 mg by mouth, Disp: , Rfl:   •  pseudoephedrine (SUDAFED) 30 mg tablet, Take 30 mg by mouth every 4 (four) hours as needed, Disp: , Rfl:      Allergies   Allergen Reactions   • Monascus Purpureus Went Yeast Other (See Comments)   • Sulfa Antibiotics Edema   • Other Rash   • Penicillins Rash        The following portions of the patient's history were updated by MA and reviewed by MD: allergies, current medications, past family history, past medical history, past social history, past surgical history and problem list     Physical Exam  Awake alert and orient  Face is symmetric in grimace and at rest  Her station and gait are normal  Her power appears 5 out of 5    RESULTS/DATA  I have personally reviewed pertinent films in PACS  Neuroma    There is some mild spondyloarthropathy at the C5-6 and C6-7 levels she has some disc space collapse and degenerative disc disease but neither of these are severe and both are commensurate with her age

## 2023-05-17 ENCOUNTER — HOSPITAL ENCOUNTER (OUTPATIENT)
Dept: NON INVASIVE DIAGNOSTICS | Facility: HOSPITAL | Age: 68
Discharge: HOME | End: 2023-05-17
Attending: INTERNAL MEDICINE

## 2023-05-17 ENCOUNTER — TELEPHONE (OUTPATIENT)
Dept: FAMILY MEDICINE CLINIC | Facility: CLINIC | Age: 68
End: 2023-05-17

## 2023-05-17 VITALS
OXYGEN SATURATION: 97 % | HEART RATE: 74 BPM | SYSTOLIC BLOOD PRESSURE: 140 MMHG | WEIGHT: 173 LBS | DIASTOLIC BLOOD PRESSURE: 80 MMHG | HEIGHT: 64 IN | BODY MASS INDEX: 29.53 KG/M2

## 2023-05-17 VITALS
SYSTOLIC BLOOD PRESSURE: 132 MMHG | DIASTOLIC BLOOD PRESSURE: 75 MMHG | BODY MASS INDEX: 29.53 KG/M2 | HEART RATE: 78 BPM | WEIGHT: 173 LBS | HEIGHT: 64 IN

## 2023-05-17 DIAGNOSIS — E78.2 MIXED HYPERLIPIDEMIA: ICD-10-CM

## 2023-05-17 DIAGNOSIS — Z82.49 FAMILY HISTORY OF HEART DISEASE: ICD-10-CM

## 2023-05-17 DIAGNOSIS — D32.9 MENINGIOMA (HCC): ICD-10-CM

## 2023-05-17 DIAGNOSIS — J30.2 SEASONAL ALLERGIC RHINITIS, UNSPECIFIED TRIGGER: ICD-10-CM

## 2023-05-17 DIAGNOSIS — R01.1 CARDIAC MURMUR: ICD-10-CM

## 2023-05-17 LAB
CHEST PAIN STATEMENT: NORMAL
MAX DIASTOLIC BP: 90 MMHG
MAX HEART RATE: 153 BPM
MAX HR PERCENT: 99 %
MAX HR: 153 BPM
MAX PREDICTED HEART RATE: 154 BPM
MAX. SYSTOLIC BP: 210 MMHG
PROTOCOL NAME: NORMAL
RATE PRESSURE PRODUCT: NORMAL
REASON FOR TERMINATION: NORMAL
SL CV STRESS RECOVERY BP: NORMAL MMHG
SL CV STRESS RECOVERY HR: 71 BPM
SL CV STRESS RECOVERY O2 SAT: 98 %
SL CV STRESS STAGE REACHED: 2
STRESS ANGINA INDEX: 0
STRESS BASELINE BP: NORMAL MMHG
STRESS BASELINE HR: 74 BPM
STRESS O2 SAT REST: 97 %
STRESS PEAK HR: 100 BPM
STRESS POST ESTIMATED WORKLOAD: 7.1 METS
STRESS POST EXERCISE DUR MIN: 6 MIN
STRESS POST EXERCISE DUR SEC: 3 SEC
STRESS POST O2 SAT PEAK: 100 %
STRESS POST PEAK BP: 210 MMHG
TARGET HR FORMULA: NORMAL
TEST INDICATION: NORMAL
TIME IN EXERCISE PHASE: NORMAL

## 2023-05-18 ENCOUNTER — OFFICE VISIT (OUTPATIENT)
Dept: CARDIOLOGY CLINIC | Facility: CLINIC | Age: 68
End: 2023-05-18

## 2023-05-18 ENCOUNTER — TELEPHONE (OUTPATIENT)
Dept: CARDIOLOGY CLINIC | Facility: CLINIC | Age: 68
End: 2023-05-18

## 2023-05-18 VITALS
WEIGHT: 173 LBS | HEART RATE: 67 BPM | SYSTOLIC BLOOD PRESSURE: 144 MMHG | BODY MASS INDEX: 29.53 KG/M2 | OXYGEN SATURATION: 98 % | HEIGHT: 64 IN | DIASTOLIC BLOOD PRESSURE: 88 MMHG

## 2023-05-18 DIAGNOSIS — D32.9 MENINGIOMA (HCC): ICD-10-CM

## 2023-05-18 DIAGNOSIS — R01.1 CARDIAC MURMUR: ICD-10-CM

## 2023-05-18 DIAGNOSIS — Z82.49 FAMILY HISTORY OF HEART DISEASE: ICD-10-CM

## 2023-05-18 DIAGNOSIS — R94.39 ABNORMAL STRESS ECG WITH TREADMILL: ICD-10-CM

## 2023-05-18 DIAGNOSIS — E78.2 MIXED HYPERLIPIDEMIA: ICD-10-CM

## 2023-05-18 DIAGNOSIS — I10 PRIMARY HYPERTENSION: ICD-10-CM

## 2023-05-18 LAB
AORTIC ROOT: 3 CM
E WAVE DECELERATION TIME: 206 MS
FRACTIONAL SHORTENING: 33 % (ref 28–44)
INTERVENTRICULAR SEPTUM IN DIASTOLE (PARASTERNAL SHORT AXIS VIEW): 0.7 CM
INTERVENTRICULAR SEPTUM: 0.7 CM (ref 0.6–1.1)
LAAS-AP2: 17.2 CM2
LAAS-AP4: 13.7 CM2
LEFT ATRIUM SIZE: 3 CM
LEFT INTERNAL DIMENSION IN SYSTOLE: 2.9 CM (ref 2.1–4)
LEFT VENTRICULAR INTERNAL DIMENSION IN DIASTOLE: 4.3 CM (ref 3.5–6)
LEFT VENTRICULAR POSTERIOR WALL IN END DIASTOLE: 0.7 CM
LEFT VENTRICULAR STROKE VOLUME: 49 ML
LVSV (TEICH): 49 ML
MV E'TISSUE VEL-LAT: 7 CM/S
MV E'TISSUE VEL-SEP: 9 CM/S
MV PEAK A VEL: 0.7 M/S
MV PEAK E VEL: 83 CM/S
MV STENOSIS PRESSURE HALF TIME: 60 MS
MV VALVE AREA P 1/2 METHOD: 3.67 CM2
RIGHT VENTRICLE ID DIMENSION: 2.8 CM
SL CV LEFT ATRIUM LENGTH A2C: 5.6 CM
SL CV LV EF: 60
SL CV PED ECHO LEFT VENTRICLE DIASTOLIC VOLUME (MOD BIPLANE) 2D: 81 ML
SL CV PED ECHO LEFT VENTRICLE SYSTOLIC VOLUME (MOD BIPLANE) 2D: 32 ML
TRICUSPID ANNULAR PLANE SYSTOLIC EXCURSION: 2.1 CM
TRICUSPID VALVE PEAK REGURGITATION VELOCITY: 2.91 M/S

## 2023-05-18 RX ORDER — AMLODIPINE BESYLATE 2.5 MG/1
2.5 TABLET ORAL DAILY
Qty: 90 TABLET | Refills: 1 | Status: SHIPPED | OUTPATIENT
Start: 2023-05-18

## 2023-05-18 NOTE — PROGRESS NOTES
Progress note  - Cardiology Office  [facility]  Martin Reece 79 y o  female MRN: [id number]  : 1955  Unit/Bed#:  Encounter: [id number]      Assessment:     1  Primary hypertension    2  Abnormal stress ECG with treadmill    3  Cardiac murmur    4  Mixed hyperlipidemia    5  Family history of heart disease    6  Meningioma [facility])        Discussion summary and Plan:     1  Mixed hyperlipidemia  Patient has been very compliant with her statins  Blood test done in 2022 cholesterol profile was acceptable LFTs are stable    2  Strong family history of heart disease with mother having MI at the age of 40 and father had coronary artery disease  Her EKG stress test was abnormal   She also hypertensive sponsor exercise she will be scheduled for nuclear stress test     3   Cardiac murmur echo Doppler shows no evidence of anything valvular disease  4   History of recently diagnosed meningioma thought to be benign follows with neurosurgery    8  Abnormal stress EKG with hypertensive response to exercise  We discussed with patient and her daughter various options available  She had no symptoms of chest pain while during exercise  She does have EKG changes  We did discuss the option of invasive versus noninvasive approach  As she has no symptoms she preferred to have noninvasive approach initially she will be scheduled for nuclear stress test   If she has any symptoms she will let us know or if stress test is abnormal we will consider then cardiac attrition  She agrees with that plan  Plan  She is already on statin therapy  Her cholesterol profiles in 2023 has been acceptable  Pathophysiology of coronary artery disease discussed with her  Continue statins  Profile has been stable  Further plan as results of nuclear stress test become available        Patient  was advised and educated to call our office  immediately if  patient has any new symptoms of chest pain/shortness of breath, near-syncope, syncope, light headedness sustained palpitations or any other cardiovascular symptoms before their scheduled follow-up appointment  Office number was provided #[phone]  Thank you for your consultation  If you have any question please call me at [phone]    Counseling :  A description of the counseling  Goals and Barriers  Patient's ability to self care: Yes  Medication side effect reviewed with patient in detail and all their questions answered to their satisfaction  Primary Care Physician: Thais Leslie MD          HPI :     Yanci Crespo is a 79y o  year old female who was referred by primary care doctor for due to cardiac risk factors of dyslipidemia and strong family history of heart disease  Patient's mother passed away from heart disease at the age of 40 and her father had heart problems  She has a medical history significant for dyslipidemia on statins, anxiety, chronic allergies was recently having issues with her eye vision when a CAT scan was read as abnormal   She became very anxious later on she was diagnosed to have meningioma which are thought to be benign  She had a strong family history of heart disease in view of her multiple risk factors and possible intervention she was sent to us  She feels well now denies any chest pain any shortness of breath and she is pretty compliant with her medication  Occasionally she has to take antiallergy and congestion medications and she has been compliant with her cholesterol therapy  Her last blood test in February 2023 shows her cholesterol profiles, electrolytes and hemoglobin and vitals has been stable  No recent surgery  She is  she lives with her  and she has adult kids  5/18/2023  Reviewed  Patient came for follow-up    She has recently exercise stress test done which shows patient has hypertensive response to exercise as well as EKG changes of 1 5 to 2 mm inferior lateral   EKG reviewed  Patient has no symptoms of chest pain at the time but EKG changes were noted  She had history of heart problem in the family, dyslipidemia and she gets anxious very easily  She does have history of meningioma  She feels well she came with her daughter  She agreed with noninvasive approach  Review of Systems   Constitutional: Negative for activity change, chills, diaphoresis, fever and unexpected weight change  HENT: Negative for congestion  Eyes: Negative for discharge and redness  Respiratory: Negative for cough, chest tightness, shortness of breath and wheezing  Cardiovascular: Negative  Negative for chest pain, palpitations and leg swelling  Gastrointestinal: Negative for abdominal pain, diarrhea and nausea  Endocrine: Negative  Genitourinary: Negative for decreased urine volume and urgency  Musculoskeletal: Negative  Negative for arthralgias, back pain and gait problem  Skin: Negative for rash and wound  Allergic/Immunologic: Negative  Neurological: Negative for dizziness, seizures, syncope, weakness, light-headedness and headaches  Hematological: Negative  Psychiatric/Behavioral: Negative for agitation and confusion  The patient is nervous/anxious          Historical Information   Past Medical History:   Diagnosis Date   • Anxiety    • Cellulitis    • Chronic rhinitis     Last Assessed:6/6/2016   • GERD without esophagitis     Last Assessed:6/6/2016   • Hyperlipidemia    • Hypometabolism    • Vitamin D deficiency      Past Surgical History:   Procedure Laterality Date   • COLONOSCOPY       Social History     Substance and Sexual Activity   Alcohol Use Yes    Comment: social     Social History     Substance and Sexual Activity   Drug Use Not on file     Social History     Tobacco Use   Smoking Status Former   • Packs/day: 1 50   • Years: 30 00   • Pack years: 45 00   • Types: Cigarettes   • Quit date: 2008   • Years since quitting: 15 3 "  Smokeless Tobacco Never     Family History:   Family History   Problem Relation Age of Onset   • Atrial fibrillation Mother    • Coronary artery disease Mother    • Atrial fibrillation Father    • Hypertension Father    • Breast cancer Maternal Grandmother 61       Meds/Allergies     Allergies   Allergen Reactions   • Monascus Purpureus Went Yeast Other (See Comments)   • Sulfa Antibiotics Edema   • Other Rash   • Penicillins Rash       Current Outpatient Medications:   •  ALPRAZolam (XANAX) 0 5 mg tablet, Take 1 tab 2 hours prior to MRI  May take another tab 30 minutes prior if needed  , Disp: 4 tablet, Rfl: 0  •  amLODIPine (NORVASC) 2 5 mg tablet, Take 1 tablet (2 5 mg total) by mouth daily, Disp: 90 tablet, Rfl: 1  •  Ascorbic Acid (VITAMIN C) 100 MG tablet, Take 100 mg by mouth daily, Disp: , Rfl:   •  atorvastatin (LIPITOR) 10 mg tablet, TAKE 1 TABLET DAILY, Disp: 90 tablet, Rfl: 1  •  Cholecalciferol (VITAMIN D3) 1000 units CAPS, Take by mouth daily , Disp: , Rfl:   •  fluticasone (FLONASE) 50 mcg/act nasal spray, 1 SPRAY INTO EACH NOSTRIL DAILY, Disp: 48 g, Rfl: 1  •  Multiple Vitamins-Iron (MULTIVITAMIN/IRON PO), daily , Disp: , Rfl:   •  Omega-3 Fatty Acids (FISH OIL PO), Take 1,000 mg by mouth, Disp: , Rfl:   •  pseudoephedrine (SUDAFED) 30 mg tablet, Take 30 mg by mouth every 4 (four) hours as needed, Disp: , Rfl:   •  Ascorbic Acid (VITAMIN C) 100 MG tablet, Take 100 mg by mouth daily, Disp: , Rfl:   •  Cholecalciferol (VITAMIN D3) 1000 units CAPS, Take by mouth daily , Disp: , Rfl:   •  estradiol (ESTRACE) 0 1 mg/g vaginal cream, Insert into the vagina, Disp: , Rfl:   •  Omega-3 Fatty Acids (FISH OIL PO), Take 1,000 mg by mouth, Disp: , Rfl:   •  pseudoephedrine (SUDAFED) 30 mg tablet, Take 30 mg by mouth every 4 (four) hours as needed, Disp: , Rfl:     Vitals: Blood pressure 144/88, pulse 67, height 5' 4\" (1 626 m), weight 78 5 kg (173 lb), SpO2 98 %, not currently breastfeeding    ?  Body mass index " is 29 7 kg/m²  Wt Readings from Last 3 Encounters:   23 78 5 kg (173 lb)   23 78 5 kg (173 lb)   23 78 5 kg (173 lb)     Vitals:    23 1546   Weight: 78 5 kg (173 lb)     BP Readings from Last 3 Encounters:   23 144/88   23 140/80   23 132/75         Physical Exam  Constitutional:       General: She is not in acute distress  Appearance: She is well-developed  She is not diaphoretic  Neck:      Thyroid: No thyromegaly  Vascular: No JVD  Cardiovascular:      Rate and Rhythm: Normal rate and regular rhythm  Pulses: Normal pulses  Heart sounds: Normal heart sounds  Pulmonary:      Effort: Pulmonary effort is normal  No respiratory distress  Breath sounds: Normal breath sounds  No wheezing or rales  Abdominal:      General: There is no distension  Palpations: Abdomen is soft  Tenderness: There is no abdominal tenderness  There is no rebound  Musculoskeletal:         General: No tenderness  Normal range of motion  Cervical back: Normal range of motion and neck supple  Skin:     General: Skin is warm and dry  Neurological:      Mental Status: She is alert and oriented to person, place, and time  Psychiatric:         Behavior: Behavior normal          Judgment: Judgment normal              Diagnostic Studies Review Cardio:    Echo Doppler  Echo Doppler done on 2022 shows EF 60%, trace valvular disease  Exercise stress test was abnormal there was 1 5 to 2 mm inferior lateral ST depression at peak stress with sluggish recovery  Noted to have marked blood pressure response to exercise    No symptoms      EK EKG done on 2023 shows normal sinus rhythm heart rate 69 bpm   No significant abnormality normal intervals    Lab Review   Lab Results   Component Value Date    WBC 5 09 02/10/2023    HGB 14 6 02/10/2023    HCT 42 3 02/10/2023    MCV 93 02/10/2023    RDW 12 3 02/10/2023     02/10/2023     BMP:  Lab "Results   Component Value Date    SODIUM 137 02/10/2023    K 3 9 02/10/2023     02/10/2023    CO2 26 02/10/2023    BUN 8 02/10/2023    CREATININE 0 66 02/10/2023    GLUC 127 02/10/2023    CALCIUM 9 8 02/10/2023    EGFR 92 02/10/2023     Troponins:    LFT:  Lab Results   Component Value Date    AST 24 02/10/2023    ALT 25 02/10/2023    ALKPHOS 91 02/10/2023    TP 7 6 02/10/2023    ALB 4 3 02/10/2023      Lipid Profile:   Lab Results   Component Value Date    CHOLESTEROL 172 11/11/2022    HDL 61 11/11/2022    LDLCALC 99 11/11/2022    TRIG 59 11/11/2022     Lab Results   Component Value Date    CHOLESTEROL 172 11/11/2022    CHOLESTEROL 202 (H) 12/30/2021           Dr Juan Cardenas MD [facility]      \"This note was completed in part utilizing m-modal fluency direct voice recognition software  Grammatical errors, random word insertion, spelling mistakes, and incomplete sentences may be an occasional consequence of the system secondary to software limitations, ambient noise and hardware issues  Please read the chart carefully and recognize, using context, where substitutions have occurred  If you have any questions or concerns about the context, text or information contained within the body of this dictation, please contact myself, the provider, for further clarification  \"  "

## 2023-05-18 NOTE — TELEPHONE ENCOUNTER
----- Message from Leelee Rodriguez MD sent at 5/18/2023 10:12 AM EDT -----  Patient's echo shows normal LV systolic function  No significant, to mild valvular disease  Patient can keep an appointment  Please call patient about echo report  Airway patent, Nasal mucosa clear. Mouth with normal mucosa. Throat has no vesicles, no oropharyngeal exudates and uvula is midline.

## 2023-06-07 ENCOUNTER — HOSPITAL ENCOUNTER (OUTPATIENT)
Dept: NON INVASIVE DIAGNOSTICS | Facility: HOSPITAL | Age: 68
Discharge: HOME | End: 2023-06-07
Attending: INTERNAL MEDICINE
Payer: COMMERCIAL

## 2023-06-07 ENCOUNTER — HOSPITAL ENCOUNTER (OUTPATIENT)
Dept: RADIOLOGY | Facility: HOSPITAL | Age: 68
Discharge: HOME | End: 2023-06-07
Attending: INTERNAL MEDICINE
Payer: COMMERCIAL

## 2023-06-07 DIAGNOSIS — R01.1 CARDIAC MURMUR: ICD-10-CM

## 2023-06-07 DIAGNOSIS — Z82.49 FAMILY HISTORY OF HEART DISEASE: ICD-10-CM

## 2023-06-07 DIAGNOSIS — I10 PRIMARY HYPERTENSION: ICD-10-CM

## 2023-06-07 DIAGNOSIS — E78.2 MIXED HYPERLIPIDEMIA: ICD-10-CM

## 2023-06-07 DIAGNOSIS — R94.39 ABNORMAL STRESS ECG WITH TREADMILL: ICD-10-CM

## 2023-06-07 DIAGNOSIS — D32.9 MENINGIOMA (HCC): ICD-10-CM

## 2023-06-07 LAB
CHEST PAIN STATEMENT: NORMAL
MAX DIASTOLIC BP: 90 MMHG
MAX HEART RATE: 148 BPM
MAX PREDICTED HEART RATE: 154 BPM
MAX. SYSTOLIC BP: 170 MMHG
PROTOCOL NAME: NORMAL
TARGET HR FORMULA: NORMAL
TEST INDICATION: NORMAL
TIME IN EXERCISE PHASE: NORMAL

## 2023-06-07 PROCEDURE — 78452 HT MUSCLE IMAGE SPECT MULT: CPT | Performed by: INTERNAL MEDICINE

## 2023-06-07 PROCEDURE — 93018 CV STRESS TEST I&R ONLY: CPT | Performed by: INTERNAL MEDICINE

## 2023-06-07 PROCEDURE — A9502 TC99M TETROFOSMIN: HCPCS

## 2023-06-07 PROCEDURE — G1004 CDSM NDSC: HCPCS

## 2023-06-07 PROCEDURE — 93016 CV STRESS TEST SUPVJ ONLY: CPT | Performed by: INTERNAL MEDICINE

## 2023-06-07 PROCEDURE — 78452 HT MUSCLE IMAGE SPECT MULT: CPT

## 2023-06-07 PROCEDURE — 93017 CV STRESS TEST TRACING ONLY: CPT

## 2023-06-08 ENCOUNTER — TELEPHONE (OUTPATIENT)
Dept: CARDIOLOGY CLINIC | Facility: CLINIC | Age: 68
End: 2023-06-08

## 2023-06-08 LAB
MAX HR PERCENT: 96 %
MAX HR: 148 BPM
NUC STRESS EJECTION FRACTION: 74 %
RATE PRESSURE PRODUCT: NORMAL
SL CV REST NUCLEAR ISOTOPE DOSE: 10.8 MCI
SL CV STRESS NUCLEAR ISOTOPE DOSE: 33 MCI
SL CV STRESS RECOVERY BP: NORMAL MMHG
SL CV STRESS RECOVERY HR: 88 BPM
SL CV STRESS RECOVERY O2 SAT: 100 %
SL CV STRESS STAGE REACHED: 2
STRESS ANGINA INDEX: 0
STRESS BASELINE BP: NORMAL MMHG
STRESS BASELINE HR: 81 BPM
STRESS O2 SAT REST: 98 %
STRESS PEAK HR: 144 BPM
STRESS POST ESTIMATED WORKLOAD: 7.1 METS
STRESS POST EXERCISE DUR MIN: 6 MIN
STRESS POST O2 SAT PEAK: 99 %
STRESS POST PEAK BP: 170 MMHG
STRESS/REST PERFUSION RATIO: 0.97

## 2023-06-08 NOTE — TELEPHONE ENCOUNTER
----- Message from Javid Hernadez MD sent at 6/8/2023  9:34 AM EDT -----  Pt's Patient's stress test is normal    Patient can keep regular appointment  Please call patient with the result

## 2023-07-24 DIAGNOSIS — E78.2 MIXED HYPERLIPIDEMIA: ICD-10-CM

## 2023-07-24 RX ORDER — ATORVASTATIN CALCIUM 10 MG/1
TABLET, FILM COATED ORAL
Qty: 90 TABLET | Refills: 1 | Status: SHIPPED | OUTPATIENT
Start: 2023-07-24

## 2023-07-25 DIAGNOSIS — Z82.49 FAMILY HISTORY OF HEART DISEASE: ICD-10-CM

## 2023-07-25 DIAGNOSIS — R94.39 ABNORMAL STRESS ECG WITH TREADMILL: ICD-10-CM

## 2023-07-25 DIAGNOSIS — R01.1 CARDIAC MURMUR: ICD-10-CM

## 2023-07-25 DIAGNOSIS — D32.9 MENINGIOMA (HCC): ICD-10-CM

## 2023-07-25 DIAGNOSIS — E78.2 MIXED HYPERLIPIDEMIA: ICD-10-CM

## 2023-07-25 DIAGNOSIS — I10 PRIMARY HYPERTENSION: ICD-10-CM

## 2023-07-25 RX ORDER — AMLODIPINE BESYLATE 2.5 MG/1
2.5 TABLET ORAL DAILY
Qty: 90 TABLET | Refills: 1 | Status: SHIPPED | OUTPATIENT
Start: 2023-07-25

## 2023-07-27 ENCOUNTER — RA CDI HCC (OUTPATIENT)
Dept: OTHER | Facility: HOSPITAL | Age: 68
End: 2023-07-27

## 2023-07-27 LAB
ALBUMIN SERPL-MCNC: 4.1 G/DL (ref 3.9–4.9)
ALBUMIN/GLOB SERPL: 1.6 {RATIO} (ref 1.2–2.2)
ALP SERPL-CCNC: 111 IU/L (ref 44–121)
ALT SERPL-CCNC: 21 IU/L (ref 0–32)
AST SERPL-CCNC: 23 IU/L (ref 0–40)
BASOPHILS # BLD AUTO: 0.1 X10E3/UL (ref 0–0.2)
BASOPHILS NFR BLD AUTO: 1 %
BILIRUB SERPL-MCNC: 0.8 MG/DL (ref 0–1.2)
BUN SERPL-MCNC: 12 MG/DL (ref 8–27)
BUN/CREAT SERPL: 17 (ref 12–28)
CALCIUM SERPL-MCNC: 9.3 MG/DL (ref 8.7–10.3)
CHLORIDE SERPL-SCNC: 103 MMOL/L (ref 96–106)
CHOLEST SERPL-MCNC: 194 MG/DL (ref 100–199)
CHOLEST/HDLC SERPL: 2.6 RATIO (ref 0–4.4)
CO2 SERPL-SCNC: 24 MMOL/L (ref 20–29)
CREAT SERPL-MCNC: 0.69 MG/DL (ref 0.57–1)
EGFR: 96 ML/MIN/1.73
EOSINOPHIL # BLD AUTO: 0.4 X10E3/UL (ref 0–0.4)
EOSINOPHIL NFR BLD AUTO: 7 %
ERYTHROCYTE [DISTWIDTH] IN BLOOD BY AUTOMATED COUNT: 12.2 % (ref 11.7–15.4)
GLOBULIN SER-MCNC: 2.6 G/DL (ref 1.5–4.5)
GLUCOSE SERPL-MCNC: 96 MG/DL (ref 70–99)
HCT VFR BLD AUTO: 39.5 % (ref 34–46.6)
HDLC SERPL-MCNC: 74 MG/DL
HGB BLD-MCNC: 13.4 G/DL (ref 11.1–15.9)
IMM GRANULOCYTES # BLD: 0 X10E3/UL (ref 0–0.1)
IMM GRANULOCYTES NFR BLD: 0 %
LDLC SERPL CALC-MCNC: 103 MG/DL (ref 0–99)
LYMPHOCYTES # BLD AUTO: 1.5 X10E3/UL (ref 0.7–3.1)
LYMPHOCYTES NFR BLD AUTO: 30 %
MCH RBC QN AUTO: 32.1 PG (ref 26.6–33)
MCHC RBC AUTO-ENTMCNC: 33.9 G/DL (ref 31.5–35.7)
MCV RBC AUTO: 95 FL (ref 79–97)
MICRODELETION SYND BLD/T FISH: NORMAL
MONOCYTES # BLD AUTO: 0.4 X10E3/UL (ref 0.1–0.9)
MONOCYTES NFR BLD AUTO: 8 %
NEUTROPHILS # BLD AUTO: 2.6 X10E3/UL (ref 1.4–7)
NEUTROPHILS NFR BLD AUTO: 54 %
PLATELET # BLD AUTO: 218 X10E3/UL (ref 150–450)
POTASSIUM SERPL-SCNC: 4.3 MMOL/L (ref 3.5–5.2)
PROT SERPL-MCNC: 6.7 G/DL (ref 6–8.5)
RBC # BLD AUTO: 4.17 X10E6/UL (ref 3.77–5.28)
SL AMB VLDL CHOLESTEROL CALC: 17 MG/DL (ref 5–40)
SODIUM SERPL-SCNC: 140 MMOL/L (ref 134–144)
TRIGL SERPL-MCNC: 97 MG/DL (ref 0–149)
WBC # BLD AUTO: 4.9 X10E3/UL (ref 3.4–10.8)

## 2023-07-27 NOTE — PROGRESS NOTES
[address] [facility] coding opportunities       Chart reviewed, no opportunity found: CHART REVIEWED, NO OPPORTUNITY FOUND        Patients Insurance        Commercial Insurance: [address]

## 2023-08-03 ENCOUNTER — OFFICE VISIT (OUTPATIENT)
Dept: FAMILY MEDICINE CLINIC | Facility: CLINIC | Age: 68
End: 2023-08-03
Payer: COMMERCIAL

## 2023-08-03 VITALS
WEIGHT: 174 LBS | DIASTOLIC BLOOD PRESSURE: 74 MMHG | RESPIRATION RATE: 16 BRPM | OXYGEN SATURATION: 97 % | TEMPERATURE: 97.6 F | HEART RATE: 68 BPM | HEIGHT: 64 IN | SYSTOLIC BLOOD PRESSURE: 122 MMHG | BODY MASS INDEX: 29.71 KG/M2

## 2023-08-03 DIAGNOSIS — Z00.00 WELL ADULT EXAM: Primary | ICD-10-CM

## 2023-08-03 PROCEDURE — 99396 PREV VISIT EST AGE 40-64: CPT | Performed by: INTERNAL MEDICINE

## 2023-08-03 NOTE — PROGRESS NOTES
FAMILY PRACTICE HEALTH MAINTENANCE OFFICE VISIT  [facility]    NAME: Karlie Don  AGE: 79 y.o. SEX: female  : 1955     DATE: 8/3/2023    Assessment and Plan     1. Well adult exam        Patient Counseling:   Nutrition: Stressed importance of a well balanced diet, moderation of sodium/saturated fat, caloric balance and sufficient intake of fiber  Exercise: Stressed the importance of regular exercise with a goal of 150 minutes per week  Dental Health: Discussed daily flossing and brushing and regular dental visits     Immunizations reviewed: Up To Date  Discussed benefits of:  Colon Cancer Screening, Mammogram , Cervical Cancer screening and Screening labs. BMI Counseling: Body mass index is 29.87 kg/m². Discussed with patient's BMI with her. The BMI is above normal. Nutrition recommendations include reducing portion sizes and decreasing overall calorie intake. Exercise recommendations include moderate aerobic physical activity for 150 minutes/week. Return in about 6 months (around 2/3/2024). Chief Complaint     Chief Complaint   Patient presents with   • Physical Exam     Ally Adair MA        History of Present Illness     Here for CPE. Feels well. Was put on bp meds by cardiology and denies side effects.        Well Adult Physical   Patient here for a comprehensive physical exam.      Diet and Physical Activity  Diet: well balanced diet  Exercise: frequently      Depression Screen  PHQ-2/9 Depression Screening    Little interest or pleasure in doing things: 0 - not at all  Feeling down, depressed, or hopeless: 0 - not at all  PHQ-2 Score: 0  PHQ-2 Interpretation: Negative depression screen          General Health  Hearing: Normal:  bilateral  Vision: no vision problems  Dental: regular dental visits    Reproductive Health  No issues  and Follows with gynecologist      The following portions of the patient's history were reviewed and updated as appropriate: allergies, current medications, past family history, past medical history, past social history, past surgical history and problem list.    Review of Systems     Review of Systems   Constitutional: Negative. HENT: Negative. Eyes: Negative. Respiratory: Negative. Cardiovascular: Negative. Gastrointestinal: Negative. Endocrine: Negative. Genitourinary: Negative. Musculoskeletal: Negative. Skin: Negative. Allergic/Immunologic: Negative. Neurological: Negative. Hematological: Negative. Psychiatric/Behavioral: Negative.         Past Medical History     Past Medical History:   Diagnosis Date   • Anxiety    • Cellulitis    • Chronic rhinitis     Last Assessed:6/6/2016   • GERD without esophagitis     Last Assessed:6/6/2016   • Hyperlipidemia    • Hypometabolism    • Vitamin D deficiency        Past Surgical History     Past Surgical History:   Procedure Laterality Date   • COLONOSCOPY         Social History     Social History     Socioeconomic History   • Marital status: /Civil Union     Spouse name: None   • Number of children: None   • Years of education: None   • Highest education level: None   Occupational History   • None   Tobacco Use   • Smoking status: Former     Packs/day: 1.50     Years: 30.00     Total pack years: 45.00     Types: Cigarettes     Quit date: 2008     Years since quitting: 15.5   • Smokeless tobacco: Never   Vaping Use   • Vaping Use: Never used   Substance and Sexual Activity   • Alcohol use: Yes     Comment: social   • Drug use: None   • Sexual activity: None   Other Topics Concern   • None   Social History Narrative   • None     Social Determinants of Health     Financial Resource Strain: Not on file   Food Insecurity: Not on file   Transportation Needs: Not on file   Physical Activity: Not on file   Stress: Not on file   Social Connections: Not on file   Intimate Partner Violence: Not on file   Housing Stability: Not on file       Family History     Family History   Problem Relation Age of Onset   • Atrial fibrillation Mother    • Coronary artery disease Mother    • Atrial fibrillation Father    • Hypertension Father    • Breast cancer Maternal Grandmother 61       Current Medications       Current Outpatient Medications:   •  amLODIPine (NORVASC) 2.5 mg tablet, TAKE 1 TABLET (2.5 MG TOTAL) BY MOUTH DAILY, Disp: 90 tablet, Rfl: 1  •  Ascorbic Acid (VITAMIN C) 100 MG tablet, Take 100 mg by mouth daily, Disp: , Rfl:   •  atorvastatin (LIPITOR) 10 mg tablet, TAKE 1 TABLET DAILY, Disp: 90 tablet, Rfl: 1  •  Cholecalciferol (VITAMIN D3) 1000 units CAPS, Take by mouth daily , Disp: , Rfl:   •  estradiol (ESTRACE) 0.1 mg/g vaginal cream, Insert into the vagina, Disp: , Rfl:   •  fluticasone (FLONASE) 50 mcg/act nasal spray, 1 SPRAY INTO EACH NOSTRIL DAILY, Disp: 48 g, Rfl: 1  •  Multiple Vitamins-Iron (MULTIVITAMIN/IRON PO), daily , Disp: , Rfl:   •  Omega-3 Fatty Acids (FISH OIL PO), Take 1,000 mg by mouth, Disp: , Rfl:      Allergies     Allergies   Allergen Reactions   • Monascus Purpureus Went Yeast Other (See Comments)   • Sulfa Antibiotics Edema   • Other Rash   • Penicillins Rash       Objective     /74   Pulse 68   Temp 97.6 °F (36.4 °C)   Resp 16   Ht 5' 4" (1.626 m)   Wt 78.9 kg (174 lb)   SpO2 97%   BMI 29.87 kg/m²      Physical Exam  Constitutional:       General: She is not in acute distress. Appearance: She is well-developed. She is not diaphoretic. HENT:      Head: Normocephalic and atraumatic. Right Ear: External ear normal.      Left Ear: External ear normal.   Neck:      Thyroid: No thyromegaly. Cardiovascular:      Rate and Rhythm: Normal rate and regular rhythm. Heart sounds: Normal heart sounds. No murmur heard. No friction rub. No gallop. Pulmonary:      Effort: Pulmonary effort is normal.      Breath sounds: Normal breath sounds. No wheezing or rales.    Abdominal:      General: Bowel sounds are normal.      Palpations: Abdomen is soft. There is no mass. Tenderness: There is no abdominal tenderness. There is no rebound. Musculoskeletal:         General: No deformity. Normal range of motion. Cervical back: Normal range of motion and neck supple. Lymphadenopathy:      Cervical: No cervical adenopathy. Skin:     General: Skin is warm and dry. Findings: No rash. Neurological:      Mental Status: She is alert and oriented to person, place, and time. Cranial Nerves: No cranial nerve deficit. Motor: No abnormal muscle tone. Coordination: Coordination normal.      Deep Tendon Reflexes: Reflexes are normal and symmetric. Reflexes normal.   Psychiatric:         Mood and Affect: Mood normal.         Behavior: Behavior normal.         Thought Content:  Thought content normal.         Judgment: Judgment normal.           Vision Screening    Right eye Left eye Both eyes   Without correction      With correction 20/25 20/25 20/25           MD NOLVIA [facility]

## 2023-08-11 ENCOUNTER — TELEPHONE (OUTPATIENT)
Dept: NEUROSURGERY | Facility: CLINIC | Age: 68
End: 2023-08-11

## 2023-08-11 NOTE — TELEPHONE ENCOUNTER
Received call from [facility] requesting pre-medication for upcoming MRI 8/16/2023. Noted order placed 2/16 which should have included enough medication to cover her for this study as well as one completed in March. Contacted her to remind her of this. She will recheck to confirm and contact the office with any issues.

## 2023-08-16 ENCOUNTER — HOSPITAL ENCOUNTER (OUTPATIENT)
Dept: MRI IMAGING | Facility: HOSPITAL | Age: 68
Discharge: HOME | End: 2023-08-16
Attending: NEUROLOGICAL SURGERY
Payer: COMMERCIAL

## 2023-08-16 DIAGNOSIS — D42.9 MULTIPLE MENINGIOMA (HCC): ICD-10-CM

## 2023-08-16 PROCEDURE — A9585 GADOBUTROL INJECTION: HCPCS | Performed by: NEUROLOGICAL SURGERY

## 2023-08-16 PROCEDURE — 70553 MRI BRAIN STEM W/O & W/DYE: CPT

## 2023-08-16 PROCEDURE — G1004 CDSM NDSC: HCPCS

## 2023-08-16 RX ORDER — GADOBUTROL 604.72 MG/ML
7 INJECTION INTRAVENOUS
Status: COMPLETED | OUTPATIENT
Start: 2023-08-16 | End: 2023-08-16

## 2023-08-16 RX ADMIN — GADOBUTROL 7 ML: 604.72 INJECTION INTRAVENOUS at 13:13

## 2023-09-07 ENCOUNTER — OFFICE VISIT (OUTPATIENT)
Dept: NEUROSURGERY | Facility: CLINIC | Age: 68
End: 2023-09-07
Payer: COMMERCIAL

## 2023-09-07 VITALS
HEART RATE: 65 BPM | WEIGHT: 174.8 LBS | BODY MASS INDEX: 29.84 KG/M2 | DIASTOLIC BLOOD PRESSURE: 67 MMHG | TEMPERATURE: 98.1 F | SYSTOLIC BLOOD PRESSURE: 133 MMHG | HEIGHT: 64 IN

## 2023-09-07 DIAGNOSIS — D32.9 MENINGIOMA (HCC): Primary | ICD-10-CM

## 2023-09-07 PROCEDURE — 99214 OFFICE O/P EST MOD 30 MIN: CPT | Performed by: NEUROLOGICAL SURGERY

## 2023-09-07 NOTE — PROGRESS NOTES
DISCUSSION SUMMARY   This is a 79 y.o. female with 3 separate meningiomas in the left frontal region. 2 of these are very small and one has increased in size over a period of observation. I have recommended therefore that surgical resection of these masses occur. The 3 are in relatively close proximity and should be able to be removed via a common craniotomy. The risks, benefits and complications of surgery were explained in detail to [employer]  including hemorrhage, infection, CSF leakage, wound problems, pain, weakness, numbness, dysesthesiae, paralysis, coma, and death. Also, the possibility  of further surgery being required was emphasized. Other potential medical complications were outlined, including deep venous thrombosis, pulmonary embolism, pneumonia, urinary tract infection, myocardial infarction,  and stroke. The need for physical therapy, occupational therapy, and rehabilitation was also mentioned. The alternatives to surgery were discussed. [employer] asked relevant questions and asked that we proceed with arrangements for surgery. Return for Surgical intervention. Diagnosis ICD-10-CM Associated Orders   1. Meningioma ([address])  D32.9 Case request operating room: Left frontal CRANIOTOMY IMAGE-GUIDED FOR TUMOR     Case request operating room: Left frontal CRANIOTOMY IMAGE-GUIDED FOR TUMOR           Chief Complaint   Patient presents with   • Follow-up     6 MONTH FOLLOW UP MENINGIOMA MRI BRAIN 8/16/23  LAST OV 3/28/23 DKO        HPI  This patient has hx of intracranial meningiomas. We have recommended repeat MRI studies for follow up and surveillance. On these follow-ups 1one has increased in size. Patient denies  nausea or vomiting she has no changes in mental status. She overall is doing very well. She does have periodic headaches. Review of Systems   Constitutional: Negative. HENT: Negative. Eyes: Negative. Respiratory: Negative.     Cardiovascular: Negative. Gastrointestinal: Negative. Endocrine: Negative. Genitourinary: Negative. Musculoskeletal: Negative. Skin: Negative. Allergic/Immunologic: Negative. Neurological: Negative. Hematological: Negative. Psychiatric/Behavioral: Negative. All other systems reviewed and are negative.   I reviewed the ROS    Vitals:    /67 (BP Location: Right arm, Patient Position: Sitting, Cuff Size: Standard)   Pulse 65   Temp 98.1 °F (36.7 °C) (Temporal)   Ht 5' 4" (1.626 m)   Wt 79.3 kg (174 lb 12.8 oz)   BMI 30.00 kg/m²     MEDICAL HISTORY  Past Medical History:   Diagnosis Date   • Anxiety    • Cellulitis    • Chronic rhinitis     Last Assessed:6/6/2016   • GERD without esophagitis     Last Assessed:6/6/2016   • Hyperlipidemia    • Hypometabolism    • Vitamin D deficiency      Past Surgical History:   Procedure Laterality Date   • COLONOSCOPY       Social History     Tobacco Use   • Smoking status: Former     Packs/day: 1.50     Years: 30.00     Total pack years: 45.00     Types: Cigarettes     Quit date: 2008     Years since quitting: 15.7   • Smokeless tobacco: Never   Vaping Use   • Vaping Use: Never used   Substance Use Topics   • Alcohol use: Yes     Comment: social      Family History   Problem Relation Age of Onset   • Atrial fibrillation Mother    • Coronary artery disease Mother    • Atrial fibrillation Father    • Hypertension Father    • Breast cancer Maternal Grandmother 61        Current Outpatient Medications:   •  amLODIPine (NORVASC) 2.5 mg tablet, TAKE 1 TABLET (2.5 MG TOTAL) BY MOUTH DAILY, Disp: 90 tablet, Rfl: 1  •  Ascorbic Acid (VITAMIN C) 100 MG tablet, Take 100 mg by mouth daily, Disp: , Rfl:   •  atorvastatin (LIPITOR) 10 mg tablet, TAKE 1 TABLET DAILY, Disp: 90 tablet, Rfl: 1  •  Cholecalciferol (VITAMIN D3) 1000 units CAPS, Take by mouth daily , Disp: , Rfl:   •  estradiol (ESTRACE) 0.1 mg/g vaginal cream, Insert into the vagina, Disp: , Rfl:   •  fluticasone (FLONASE) 50 mcg/act nasal spray, 1 SPRAY INTO EACH NOSTRIL DAILY, Disp: 48 g, Rfl: 1  •  Multiple Vitamins-Iron (MULTIVITAMIN/IRON PO), daily , Disp: , Rfl:   •  Omega-3 Fatty Acids (FISH OIL PO), Take 1,000 mg by mouth, Disp: , Rfl:      Allergies   Allergen Reactions   • Monascus Purpureus Went Yeast Other (See Comments)   • Sulfa Antibiotics Edema   • Other Rash   • Penicillins Rash        The following portions of the patient's history were updated by MA and reviewed by MD: allergies, current medications, past family history, past medical history, past social history, past surgical history and problem list.    Physical Exam  Vitals and nursing note reviewed. Constitutional:       General: She is not in acute distress. Appearance: Normal appearance. She is not ill-appearing, toxic-appearing or diaphoretic. HENT:      Head: Normocephalic. Nose: Nose normal.   Eyes:      Extraocular Movements: Extraocular movements intact. Pupils: Pupils are equal, round, and reactive to light. Cardiovascular:      Rate and Rhythm: Normal rate and regular rhythm. Pulses: Normal pulses. Heart sounds: Normal heart sounds. Pulmonary:      Effort: Pulmonary effort is normal.      Breath sounds: Normal breath sounds. Abdominal:      General: Abdomen is flat. Bowel sounds are normal.   Musculoskeletal:         General: No swelling, tenderness, deformity or signs of injury. Normal range of motion. Cervical back: Normal range of motion and neck supple. No rigidity. Right lower leg: No edema. Left lower leg: No edema. Lymphadenopathy:      Cervical: No cervical adenopathy. Skin:     General: Skin is warm and dry. Coloration: Skin is not jaundiced. Findings: No erythema or rash. Neurological:      General: No focal deficit present. Mental Status: She is alert and oriented to person, place, and time. Cranial Nerves: No cranial nerve deficit.       Sensory: No sensory deficit. Motor: No weakness. Coordination: Coordination normal.      Gait: Gait normal.      Deep Tendon Reflexes: Reflexes normal.   Psychiatric:         Mood and Affect: Mood normal.         Behavior: Behavior normal.         Thought Content: Thought content normal.         Judgment: Judgment normal.           RESULTS/DATA  I have personally reviewed pertinent films in PACS     MRI of the brain is carefully reviewed. This demonstrates 3 contrast-enhancing masses can assistant with a meningioma. The largest of these has a approximate 3 cm overall size when measured to the tumor involved tail of the dura. There is one cortical vessel which is parasitized by the larger meningioma.

## 2023-09-11 ENCOUNTER — TELEPHONE (OUTPATIENT)
Dept: CARDIOLOGY CLINIC | Facility: CLINIC | Age: 68
End: 2023-09-11

## 2023-09-13 RX ORDER — CHLORHEXIDINE GLUCONATE ORAL RINSE 1.2 MG/ML
15 SOLUTION DENTAL ONCE
OUTPATIENT
Start: 2023-09-13 | End: 2023-09-13

## 2023-09-13 RX ORDER — VANCOMYCIN HYDROCHLORIDE 1 G/200ML
1000 INJECTION, SOLUTION INTRAVENOUS ONCE
OUTPATIENT
Start: 2023-09-13 | End: 2023-09-13

## 2023-09-25 ENCOUNTER — TELEPHONE (OUTPATIENT)
Dept: NEUROSURGERY | Facility: CLINIC | Age: 68
End: 2023-09-25

## 2023-09-25 NOTE — TELEPHONE ENCOUNTER
Completed [employer] forms faxed to [email or web address]. and mailed to home address as well as requested by patient    Called patient cell and home to followup with patient in regards to fmla papers  Numbers were not working

## 2023-09-27 ENCOUNTER — TELEPHONE (OUTPATIENT)
Dept: NEUROSURGERY | Facility: CLINIC | Age: 68
End: 2023-09-27

## 2023-09-27 NOTE — TELEPHONE ENCOUNTER
Callback received. Maira had specific questions about the post operative period. Reviewed typical post op course. Explained that she will receive a call from PAT to go over med list, will be asked to hold all OTC meds and supplements 7 days prior. Will not be able to drive for the first 6 weeks post op. She was appreciative of the input. Encouraged her to call with questions or concerns.

## 2023-09-27 NOTE — TELEPHONE ENCOUNTER
Received a call from [facility] requesting a call back regarding some surgical questions. Attempted to return her call without success. The line rang >10x before routing to error message. Unable to leave a vm. Will attempt her again tomorrow if no callback received.

## 2023-10-16 ENCOUNTER — OFFICE VISIT (OUTPATIENT)
Dept: FAMILY MEDICINE CLINIC | Facility: CLINIC | Age: 68
End: 2023-10-16
Payer: COMMERCIAL

## 2023-10-16 ENCOUNTER — TELEPHONE (OUTPATIENT)
Dept: NEUROSURGERY | Facility: CLINIC | Age: 68
End: 2023-10-16

## 2023-10-16 VITALS
WEIGHT: 174 LBS | DIASTOLIC BLOOD PRESSURE: 70 MMHG | HEIGHT: 64 IN | BODY MASS INDEX: 29.71 KG/M2 | HEART RATE: 66 BPM | SYSTOLIC BLOOD PRESSURE: 122 MMHG | RESPIRATION RATE: 18 BRPM | TEMPERATURE: 97.5 F | OXYGEN SATURATION: 98 %

## 2023-10-16 DIAGNOSIS — Z23 ENCOUNTER FOR IMMUNIZATION: ICD-10-CM

## 2023-10-16 DIAGNOSIS — I10 PRIMARY HYPERTENSION: ICD-10-CM

## 2023-10-16 DIAGNOSIS — Z01.818 PREOP EXAMINATION: Primary | ICD-10-CM

## 2023-10-16 DIAGNOSIS — D32.9 MENINGIOMA (HCC): ICD-10-CM

## 2023-10-16 PROCEDURE — 90471 IMMUNIZATION ADMIN: CPT

## 2023-10-16 PROCEDURE — 99213 OFFICE O/P EST LOW 20 MIN: CPT | Performed by: INTERNAL MEDICINE

## 2023-10-16 PROCEDURE — 90662 IIV NO PRSV INCREASED AG IM: CPT

## 2023-10-16 NOTE — PROGRESS NOTES
[facility] PRE-OPERATIVE EVALUATION  [facility]    NAME: Juan Ramon Myers  AGE: 79 y.o. SEX: female  : 1955     DATE: 10/16/2023    [facility] Pre-Operative Evaluation      Chief Complaint: Pre-operative Evaluation     Surgery: Left frontal craniotomy and removal of tumor  Anticipated Date of Surgery: 23  Surgeon: Dr. Nereyda Wen      History of Present Illness:     Here for preop clearance for craniotomy/meningioma removal. She is apprehensive but is otherwise feeling well. Has appointment with cardiology on Wednesday and will have her labwork drawn then. Anesthesia:  general  Bleeding Risk: no recent abnormal bleeding  Current Anti-platelet/anticoagulation medication:  none    Assessment of Cardiac Risk:  Denies unstable or severe angina or MI in the last 6 weeks or history of stent placement in the last year   Denies decompensated heart failure (e.g. New onset heart failure, NYHA functional class IV heart failure, or worsening existing heart failure)  Denies significant arrhythmias such as high grade AV block, symptomatic ventricular arrhythmia, newly recognized ventricular tachycardia, supraventricular tachycardia with resting heart rate >100, or symptomatic bradycardia  Denies severe heart valve disease including aortic stenosis or symptomatic mitral stenosis     Exercise Capacity:  Able to walk 4 blocks without symptoms?: Yes  Able to walk 2 flights without symptoms?: Yes    Prior Anesthesia Reactions: No     Personal history of venous thromboembolic disease? No    History of steroid use for >2 weeks within last year? No         Review of Systems:     Review of Systems   Constitutional:  Negative for chills and fever. HENT:  Negative for ear pain and sore throat. Eyes:  Negative for pain and visual disturbance. Respiratory:  Negative for cough and shortness of breath. Cardiovascular:  Negative for chest pain and palpitations. Gastrointestinal:  Negative for abdominal pain and vomiting. Genitourinary:  Negative for dysuria and hematuria. Musculoskeletal:  Negative for arthralgias and back pain. Skin:  Negative for color change and rash. Neurological:  Negative for seizures, syncope and headaches. All other systems reviewed and are negative. Current Problem List:     Patient Active Problem List   Diagnosis   • Chronic sinusitis   • Atrophic vaginitis   • Cyst of Bartholin's gland duct   • Leiomyoma of uterus   • Mixed hyperlipidemia   • Seasonal allergic rhinitis   • BMI 28.0-28.9,adult   • Osteoporosis   • Lateral epicondylitis of left elbow   • Stress incontinence in female   • Meningioma [facility])   • Primary hypertension       Allergies:      Allergies   Allergen Reactions   • Monascus Purpureus Went Yeast Other (See Comments)   • Sulfa Antibiotics Edema   • Other Rash   • Penicillins Rash       Current Medications:       Current Outpatient Medications:   •  amLODIPine (NORVASC) 2.5 mg tablet, TAKE 1 TABLET (2.5 MG TOTAL) BY MOUTH DAILY, Disp: 90 tablet, Rfl: 1  •  Ascorbic Acid (VITAMIN C) 100 MG tablet, Take 100 mg by mouth daily, Disp: , Rfl:   •  atorvastatin (LIPITOR) 10 mg tablet, TAKE 1 TABLET DAILY, Disp: 90 tablet, Rfl: 1  •  Cholecalciferol (VITAMIN D3) 1000 units CAPS, Take by mouth daily , Disp: , Rfl:   •  estradiol (ESTRACE) 0.1 mg/g vaginal cream, Insert into the vagina, Disp: , Rfl:   •  fluticasone (FLONASE) 50 mcg/act nasal spray, 1 SPRAY INTO EACH NOSTRIL DAILY, Disp: 48 g, Rfl: 1  •  Multiple Vitamins-Iron (MULTIVITAMIN/IRON PO), daily , Disp: , Rfl:   •  Omega-3 Fatty Acids (FISH OIL PO), Take 1,000 mg by mouth, Disp: , Rfl:     Past Medical History:       Past Medical History:   Diagnosis Date   • Anxiety    • Cellulitis    • Chronic rhinitis     Last Assessed:6/6/2016   • GERD without esophagitis     Last Assessed:6/6/2016   • Hyperlipidemia    • Hypometabolism    • Vitamin D deficiency         Past Surgical History:   Procedure Laterality Date   • COLONOSCOPY          Family History   Problem Relation Age of Onset   • Atrial fibrillation Mother    • Coronary artery disease Mother    • Atrial fibrillation Father    • Hypertension Father    • Breast cancer Maternal Grandmother 61        Social History     Socioeconomic History   • Marital status: /Civil Union     Spouse name: Not on file   • Number of children: Not on file   • Years of education: Not on file   • Highest education level: Not on file   Occupational History   • Not on file   Tobacco Use   • Smoking status: Former     Packs/day: 1.50     Years: 30.00     Total pack years: 45.00     Types: Cigarettes     Start date: 28     Quit date: 2008     Years since quitting: 15.8   • Smokeless tobacco: Never   Vaping Use   • Vaping Use: Never used   Substance and Sexual Activity   • Alcohol use: Yes     Comment: social   • Drug use: Never   • Sexual activity: Not on file   Other Topics Concern   • Not on file   Social History Narrative   • Not on file     Social Determinants of Health     Financial Resource Strain: Not on file   Food Insecurity: Not on file   Transportation Needs: Not on file   Physical Activity: Not on file   Stress: Not on file   Social Connections: Not on file   Intimate Partner Violence: Not on file   Housing Stability: Not on file        Physical Exam:     /70   Pulse 66   Temp 97.5 °F (36.4 °C)   Resp 18   Ht 5' 4" (1.626 m)   Wt 78.9 kg (174 lb)   SpO2 98%   BMI 29.87 kg/m²     Physical Exam  Constitutional:       General: She is not in acute distress. Appearance: She is well-developed. She is not diaphoretic. HENT:      Head: Normocephalic and atraumatic. Right Ear: External ear normal.      Left Ear: External ear normal.   Neck:      Thyroid: No thyromegaly. Cardiovascular:      Rate and Rhythm: Normal rate and regular rhythm. Heart sounds: Normal heart sounds. No murmur heard.      No friction rub. No gallop. Pulmonary:      Effort: Pulmonary effort is normal.      Breath sounds: Normal breath sounds. No wheezing or rales. Abdominal:      General: Bowel sounds are normal.      Palpations: Abdomen is soft. There is no mass. Tenderness: There is no abdominal tenderness. There is no rebound. Musculoskeletal:         General: No deformity. Normal range of motion. Cervical back: Normal range of motion and neck supple. Lymphadenopathy:      Cervical: No cervical adenopathy. Skin:     General: Skin is warm and dry. Findings: No rash. Neurological:      Mental Status: She is alert and oriented to person, place, and time. Cranial Nerves: No cranial nerve deficit. Motor: No abnormal muscle tone. Coordination: Coordination normal.      Deep Tendon Reflexes: Reflexes are normal and symmetric. Reflexes normal.   Psychiatric:         Behavior: Behavior normal.         Thought Content: Thought content normal.         Judgment: Judgment normal.        Data:     Pre-operative work-up    Laboratory Results: pending    EKG: pending    No results found for this or any previous visit (from the past 672 hour(s)). Assessment & Recommendations:     Problem List Items Addressed This Visit        Cardiovascular and Mediastinum    Primary hypertension     Well controlled and will continue low dose amlodipine through the morning of surgery. Nervous and Auditory    Meningioma ([address])   Other Visit Diagnoses     Preop examination    -  Primary    Encounter for immunization        Relevant Orders    influenza vaccine, high-dose, PF 0.7 mL (FLUZONE HIGH-DOSE) (Completed)            Pre-Op Evaluation Assessment  79 y.o. female with planned surgery: craniotomy with removal of meningioma. Known risk factors for perioperative complications: None. Current medications which may produce withdrawal symptoms if withheld perioperatively: none  . Pre-Op Evaluation Plan  1. Further preoperative workup as follows:   Has cardiology appointment for clearance on 10/18/23. 2. Medication Management/Recommendations:   - Patient has been instructed to avoid herbs or non-directed vitamins the week prior to surgery to ensure no drug interactions with perioperative surgical and anesthetic medications. - Patient should continue antihypertensive medications up through and including the day of surgery. - Patient has been instructed to avoid aspirin containing medications or non-steroidal anti-inflammatory drugs for the week preceding surgery. 3. Prophylaxis for cardiac events with perioperative beta-blockers: not indicated. 4. Patient requires further consultation with: Cardiology    Clearance  Pending, will provide final clearance once labs and EKG are completed.       MD B[facility]I [facility]  [place] [address]  [address] [address]  Phone#  [phone]  Fax#  [phone]

## 2023-10-16 NOTE — TELEPHONE ENCOUNTER
Received initial fmla paperwork from patients family member.   Completed [facility] paperwork   Faxed to the appropriate number on paper #[phone]  Faxed to [email or web address]

## 2023-10-18 ENCOUNTER — OFFICE VISIT (OUTPATIENT)
Dept: CARDIOLOGY CLINIC | Facility: CLINIC | Age: 68
End: 2023-10-18
Payer: COMMERCIAL

## 2023-10-18 VITALS
DIASTOLIC BLOOD PRESSURE: 70 MMHG | BODY MASS INDEX: 30.05 KG/M2 | SYSTOLIC BLOOD PRESSURE: 130 MMHG | OXYGEN SATURATION: 99 % | HEART RATE: 69 BPM | HEIGHT: 64 IN | WEIGHT: 176 LBS

## 2023-10-18 DIAGNOSIS — R01.1 CARDIAC MURMUR: ICD-10-CM

## 2023-10-18 DIAGNOSIS — E78.2 MIXED HYPERLIPIDEMIA: ICD-10-CM

## 2023-10-18 DIAGNOSIS — Z01.810 PRE-OPERATIVE CARDIOVASCULAR EXAMINATION: Primary | ICD-10-CM

## 2023-10-18 DIAGNOSIS — I10 PRIMARY HYPERTENSION: ICD-10-CM

## 2023-10-18 PROCEDURE — 99214 OFFICE O/P EST MOD 30 MIN: CPT | Performed by: NURSE PRACTITIONER

## 2023-10-18 PROCEDURE — 93000 ELECTROCARDIOGRAM COMPLETE: CPT | Performed by: NURSE PRACTITIONER

## 2023-10-18 NOTE — LETTER
2023     Wiliam Hernandez, Luigi [address] [address]    Patient: Hawa Red   YOB: 1955   Date of Visit: 10/18/2023       Dear Dr. Jeri Hand: Thank you for referring Tyesha Young to me for evaluation. Below are my notes for this consultation. If you have questions, please do not hesitate to call me. I look forward to following your patient along with you. Sincerely,        LEOPOLDO Beal        CC: No Recipients    Shaila Hughes, [address] [place] [address]  10/18/2023  2:15 PM  Incomplete    Progress Note - Cardiology Office  [facility]    Hawa Red 79 y.o. female MRN: [id number]  : 1955  Encounter: [id number]      Assessment:     1. Pre-operative cardiovascular examination    2. Cardiac murmur    3. Mixed hyperlipidemia    4. Primary hypertension        Discussion Summary and Plan:  1. Hypertension: Blood pressures currently acceptable    2. Dyslipidemia: The 10-year ASCVD risk score (Lorraine DK, et al., 2019) is: 7.5%    Values used to calculate the score:      Age: 79 years      Sex: Female      Is Non- : No      Diabetic: No      Tobacco smoker: No      Systolic Blood Pressure: 983 mmHg      Is BP treated: Yes      HDL Cholesterol: 74 mg/dL      Total Cholesterol: 194 mg/dL    -   Continue Lipitor 10 mg daily    3. Presurgical risk stratification: Patient is at low risk for perioperative complications. Blood pressures are stable and she does not possess history of heart failure or angina. Recent nuclear stress testing did not demonstrate any perfusion defects and echocardiogram demonstrated normal ejection fraction with no regional wall motion abnormality.       Patient / Beverly Rodrigezurry was advised and educated to call our office  immediately if  patient has any new symptoms of chest pain/shortness of breath, near-syncope, syncope, light headedness sustained palpitations  or any other cardiovascular symptoms before their scheduled follow-up appointment. Office number was provided #[phone]. Please call [phone] if any questions. Counseling :  A description of the counseling. Goals and Barriers. Patient's ability to self care: Yes  Medication side effect reviewed with patient in detail and all their questions answered to their satisfaction. HPI :     Genevieve Chiang is a 79y.o. year old female who came for follow up. Patient presents for follow-up of test results and also presurgical risk stratification as she is scheduled for craniotomy due to meningioma on 11/1/2023. Patient underwent nuclear stress testing on 6/7/2023, which patient exercised for 6 minutes achieving and completing stage II of a standard Ole protocol and 7.1 work METS. She did not experience any angina during the exercise. And testing was stopped as she reached target heart rate. Twelve-lead EKG did demonstrate horizontal ST depression seen in the inferior lateral leads of 2, 3, aVF, V4 through V6. Nuclear perfusion imaging demonstrated a mid to apical fixed defect which improved with proning images most likely due to breast attenuation. 2D echocardiogram performed on 5/17/2023 demonstrated normal ejection fraction of 60% with no regional wall motion abnormality and no valvular dysfunction. Patient remains active, she does not have any complaints of shortness of breath, chest pain or pressure. She has not noted any change in her ability to perform activities. Past history is significant for dyslipidemia and strong family history of coronary artery disease. Mother passed away at the age of 40 and father has known coronary problems. Review of Systems   Constitutional: Negative. Negative for activity change and fatigue. HENT: Negative. Negative for congestion, facial swelling, postnasal drip and sore throat. Eyes: Negative. Negative for photophobia and visual disturbance.    Respiratory: Negative. Negative for chest tightness and shortness of breath. Cardiovascular: Negative. Negative for chest pain, palpitations and leg swelling. Gastrointestinal: Negative. Negative for abdominal distention, constipation, diarrhea, nausea and vomiting. Endocrine: Negative. Negative for polydipsia, polyphagia and polyuria. Genitourinary: Negative. Negative for difficulty urinating. Musculoskeletal: Negative. Skin: Negative. Neurological: Negative. Negative for dizziness, syncope and light-headedness. Hematological: Negative. Psychiatric/Behavioral: Negative.          Historical Information   Past Medical History:   Diagnosis Date   • Anxiety    • Cellulitis    • Chronic rhinitis     Last Assessed:6/6/2016   • GERD without esophagitis     Last Assessed:6/6/2016   • Hyperlipidemia    • Hypometabolism    • Vitamin D deficiency      Past Surgical History:   Procedure Laterality Date   • COLONOSCOPY       Social History     Substance and Sexual Activity   Alcohol Use Yes    Comment: social     Social History     Substance and Sexual Activity   Drug Use Never     Social History     Tobacco Use   Smoking Status Former   • Packs/day: 1.50   • Years: 30.00   • Total pack years: 45.00   • Types: Cigarettes   • Start date: 28   • Quit date: 2008   • Years since quitting: 15.8   Smokeless Tobacco Never     Family History:   Family History   Problem Relation Age of Onset   • Atrial fibrillation Mother    • Coronary artery disease Mother    • Atrial fibrillation Father    • Hypertension Father    • Breast cancer Maternal Grandmother 61       Meds/Allergies     Allergies   Allergen Reactions   • Monascus Purpureus Went Yeast Other (See Comments)   • Sulfa Antibiotics Edema   • Other Rash   • Penicillins Rash       Current Outpatient Medications:   •  amLODIPine (NORVASC) 2.5 mg tablet, TAKE 1 TABLET (2.5 MG TOTAL) BY MOUTH DAILY, Disp: 90 tablet, Rfl: 1  •  Ascorbic Acid (VITAMIN C) 100 MG tablet, Take 100 mg by mouth daily, Disp: , Rfl:   •  atorvastatin (LIPITOR) 10 mg tablet, TAKE 1 TABLET DAILY, Disp: 90 tablet, Rfl: 1  •  Cholecalciferol (VITAMIN D3) 1000 units CAPS, Take by mouth daily , Disp: , Rfl:   •  estradiol (ESTRACE) 0.1 mg/g vaginal cream, Insert into the vagina, Disp: , Rfl:   •  fluticasone (FLONASE) 50 mcg/act nasal spray, 1 SPRAY INTO EACH NOSTRIL DAILY, Disp: 48 g, Rfl: 1  •  Multiple Vitamins-Iron (MULTIVITAMIN/IRON PO), daily , Disp: , Rfl:   •  Omega-3 Fatty Acids (FISH OIL PO), Take 1,000 mg by mouth, Disp: , Rfl:     Vitals: Blood pressure 130/70, pulse 69, height 5' 4" (1.626 m), weight 79.8 kg (176 lb), SpO2 99 %, not currently breastfeeding. Body mass index is 30.21 kg/m². Wt Readings from Last 3 Encounters:   10/18/23 79.8 kg (176 lb)   10/16/23 78.9 kg (174 lb)   09/07/23 79.3 kg (174 lb 12.8 oz)     Vitals:    10/18/23 1340   Weight: 79.8 kg (176 lb)     BP Readings from Last 3 Encounters:   10/18/23 130/70   10/16/23 122/70   09/07/23 133/67       Physical Exam:  Physical Exam  Vitals and nursing note reviewed. Constitutional:       Appearance: Normal appearance. She is obese. HENT:      Right Ear: External ear normal.      Left Ear: External ear normal.   Eyes:      General: No scleral icterus. Right eye: No discharge. Left eye: No discharge. Cardiovascular:      Rate and Rhythm: Normal rate and regular rhythm. Pulses: Normal pulses. Heart sounds: Normal heart sounds. Pulmonary:      Effort: Pulmonary effort is normal. No respiratory distress. Breath sounds: Normal breath sounds. Abdominal:      General: Bowel sounds are normal. There is no distension. Palpations: Abdomen is soft. Musculoskeletal:      Right lower leg: No edema. Left lower leg: No edema. Skin:     General: Skin is warm and dry. Capillary Refill: Capillary refill takes less than 2 seconds. Neurological:      General: No focal deficit present. Mental Status: She is alert and oriented to person, place, and time. Mental status is at baseline. Psychiatric:         Mood and Affect: Mood normal.           Diagnostic Studies Review Cardio:      EKG:  Sinus rhythm       [facility]        "This note was completed in part utilizing [employer] direct voice recognition software. Grammatical errors, random word insertion, spelling mistakes, and incomplete sentences may be an occasional consequence of the system secondary to software limitations, ambient noise and hardware issues. Please read the chart carefully and recognize, using context, where substitutions have occurred. If you have any questions or concerns about the context, text or information contained within the body of this dictation, please contact myself, the provider, for further clarification."    Sorin Camarillo [place]  10/18/2023  1:57 PM  Incomplete    Progress Note - Cardiology Office  [facility]    Deuce Patel 79 y.o. female MRN: [id number]  : 1955  Encounter: [id number]      Assessment:     1. Pre-operative cardiovascular examination    2. Cardiac murmur    3. Mixed hyperlipidemia    4. Primary hypertension        Discussion Summary and Plan:      ]  Patient / Dalia Bridges was advised and educated to call our office  immediately if  patient has any new symptoms of chest pain/shortness of breath, near-syncope, syncope, light headedness sustained palpitations  or any other cardiovascular symptoms before their scheduled follow-up appointment. Office number was provided #[phone]. Please call [phone] if any questions. Counseling :  A description of the counseling. Goals and Barriers. Patient's ability to self care: Yes  Medication side effect reviewed with patient in detail and all their questions answered to their satisfaction. HPI :     Deuce Patel is a 79y.o. year old female who came for follow up.  Patient has    Review of Systems    Historical Information  Past Medical History:   Diagnosis Date   • Anxiety    • Cellulitis    • Chronic rhinitis     Last Assessed:6/6/2016   • GERD without esophagitis     Last Assessed:6/6/2016   • Hyperlipidemia    • Hypometabolism    • Vitamin D deficiency      Past Surgical History:   Procedure Laterality Date   • COLONOSCOPY       Social History     Substance and Sexual Activity   Alcohol Use Yes    Comment: social     Social History     Substance and Sexual Activity   Drug Use Never     Social History     Tobacco Use   Smoking Status Former   • Packs/day: 1.50   • Years: 30.00   • Total pack years: 45.00   • Types: Cigarettes   • Start date: 28   • Quit date: 2008   • Years since quitting: 15.8   Smokeless Tobacco Never     Family History:   Family History   Problem Relation Age of Onset   • Atrial fibrillation Mother    • Coronary artery disease Mother    • Atrial fibrillation Father    • Hypertension Father    • Breast cancer Maternal Grandmother 61       Meds/Allergies    Allergies   Allergen Reactions   • Monascus Purpureus Went Yeast Other (See Comments)   • Sulfa Antibiotics Edema   • Other Rash   • Penicillins Rash       Current Outpatient Medications:   •  amLODIPine (NORVASC) 2.5 mg tablet, TAKE 1 TABLET (2.5 MG TOTAL) BY MOUTH DAILY, Disp: 90 tablet, Rfl: 1  •  Ascorbic Acid (VITAMIN C) 100 MG tablet, Take 100 mg by mouth daily, Disp: , Rfl:   •  atorvastatin (LIPITOR) 10 mg tablet, TAKE 1 TABLET DAILY, Disp: 90 tablet, Rfl: 1  •  Cholecalciferol (VITAMIN D3) 1000 units CAPS, Take by mouth daily , Disp: , Rfl:   •  estradiol (ESTRACE) 0.1 mg/g vaginal cream, Insert into the vagina, Disp: , Rfl:   •  fluticasone (FLONASE) 50 mcg/act nasal spray, 1 SPRAY INTO EACH NOSTRIL DAILY, Disp: 48 g, Rfl: 1  •  Multiple Vitamins-Iron (MULTIVITAMIN/IRON PO), daily , Disp: , Rfl:   •  Omega-3 Fatty Acids (FISH OIL PO), Take 1,000 mg by mouth, Disp: , Rfl:     Vitals: Blood pressure 130/70, pulse 69, height 5' 4" (1.626 m), weight 79.8 kg (176 lb), SpO2 99 %, not currently breastfeeding. Body mass index is 30.21 kg/m². Wt Readings from Last 3 Encounters:   10/18/23 79.8 kg (176 lb)   10/16/23 78.9 kg (174 lb)   23 79.3 kg (174 lb 12.8 oz)     Vitals:    10/18/23 1340   Weight: 79.8 kg (176 lb)     BP Readings from Last 3 Encounters:   10/18/23 130/70   10/16/23 122/70   23 133/67       Physical Exam:  Physical Exam      Diagnostic Studies Review Cardio:      [facility]  Cardiology        "This note was completed in part utilizing [employer] direct voice recognition software. Grammatical errors, random word insertion, spelling mistakes, and incomplete sentences may be an occasional consequence of the system secondary to software limitations, ambient noise and hardware issues. Please read the chart carefully and recognize, using context, where substitutions have occurred.   If you have any questions or concerns about the context, text or information contained within the body of this dictation, please contact myself, the provider, for further clarification."

## 2023-10-18 NOTE — PROGRESS NOTES
Progress Note - Cardiology Office  [facility]    Víctor Salas 79 y.o. female MRN: [id number]  : 1955  Encounter: [id number]      Assessment:     1. Pre-operative cardiovascular examination    2. Cardiac murmur    3. Mixed hyperlipidemia    4. Primary hypertension        Discussion Summary and Plan:  1. Hypertension: Blood pressures currently acceptable    2. Dyslipidemia: The 10-year ASCVD risk score (Lorraine ALANIS, et al., 2019) is: 7.5%    Values used to calculate the score:      Age: 79 years      Sex: Female      Is Non- : No      Diabetic: No      Tobacco smoker: No      Systolic Blood Pressure: 658 mmHg      Is BP treated: Yes      HDL Cholesterol: 74 mg/dL      Total Cholesterol: 194 mg/dL    -   Continue Lipitor 10 mg daily    3. Presurgical risk stratification: Patient is at low risk for perioperative complications. Blood pressures are stable and she does not possess history of heart failure or angina. Recent nuclear stress testing did not demonstrate any perfusion defects and echocardiogram demonstrated normal ejection fraction with no regional wall motion abnormality. Patient / Michelle Ross was advised and educated to call our office  immediately if  patient has any new symptoms of chest pain/shortness of breath, near-syncope, syncope, light headedness sustained palpitations  or any other cardiovascular symptoms before their scheduled follow-up appointment. Office number was provided #[phone]. Please call [phone] if any questions. Counseling :  A description of the counseling. Goals and Barriers. Patient's ability to self care: Yes  Medication side effect reviewed with patient in detail and all their questions answered to their satisfaction. HPI :     Víctor Salas is a 79y.o. year old female who came for follow up.   Patient presents for follow-up of test results and also presurgical risk stratification as she is scheduled for craniotomy due to meningioma on 11/1/2023. Patient underwent nuclear stress testing on 6/7/2023, which patient exercised for 6 minutes achieving and completing stage II of a standard Ole protocol and 7.1 work METS. She did not experience any angina during the exercise. And testing was stopped as she reached target heart rate. Twelve-lead EKG did demonstrate horizontal ST depression seen in the inferior lateral leads of 2, 3, aVF, V4 through V6. Nuclear perfusion imaging demonstrated a mid to apical fixed defect which improved with proning images most likely due to breast attenuation. 2D echocardiogram performed on 5/17/2023 demonstrated normal ejection fraction of 60% with no regional wall motion abnormality and no valvular dysfunction. Patient remains active, she does not have any complaints of shortness of breath, chest pain or pressure. She has not noted any change in her ability to perform activities. Past history is significant for dyslipidemia and strong family history of coronary artery disease. Mother passed away at the age of 40 and father has known coronary problems. Review of Systems   Constitutional: Negative. Negative for activity change and fatigue. HENT: Negative. Negative for congestion, facial swelling, postnasal drip and sore throat. Eyes: Negative. Negative for photophobia and visual disturbance. Respiratory: Negative. Negative for chest tightness and shortness of breath. Cardiovascular: Negative. Negative for chest pain, palpitations and leg swelling. Gastrointestinal: Negative. Negative for abdominal distention, constipation, diarrhea, nausea and vomiting. Endocrine: Negative. Negative for polydipsia, polyphagia and polyuria. Genitourinary: Negative. Negative for difficulty urinating. Musculoskeletal: Negative. Skin: Negative. Neurological: Negative. Negative for dizziness, syncope and light-headedness.    Hematological: Negative. Psychiatric/Behavioral: Negative.          Historical Information   Past Medical History:   Diagnosis Date    Anxiety     Cellulitis     Chronic rhinitis     Last Assessed:6/6/2016    GERD without esophagitis     Last Assessed:6/6/2016    Hyperlipidemia     Hypometabolism     Vitamin D deficiency      Past Surgical History:   Procedure Laterality Date    COLONOSCOPY       Social History     Substance and Sexual Activity   Alcohol Use Yes    Comment: social     Social History     Substance and Sexual Activity   Drug Use Never     Social History     Tobacco Use   Smoking Status Former    Packs/day: 1.50    Years: 30.00    Total pack years: 45.00    Types: Cigarettes    Start date: 28    Quit date: 2008    Years since quitting: 15.8   Smokeless Tobacco Never     Family History:   Family History   Problem Relation Age of Onset    Atrial fibrillation Mother     Coronary artery disease Mother     Atrial fibrillation Father     Hypertension Father     Breast cancer Maternal Grandmother 60       Meds/Allergies     Allergies   Allergen Reactions    Monascus Purpureus Went Yeast Other (See Comments)    Sulfa Antibiotics Edema    Other Rash    Penicillins Rash       Current Outpatient Medications:     amLODIPine (NORVASC) 2.5 mg tablet, TAKE 1 TABLET (2.5 MG TOTAL) BY MOUTH DAILY, Disp: 90 tablet, Rfl: 1    Ascorbic Acid (VITAMIN C) 100 MG tablet, Take 100 mg by mouth daily, Disp: , Rfl:     atorvastatin (LIPITOR) 10 mg tablet, TAKE 1 TABLET DAILY, Disp: 90 tablet, Rfl: 1    Cholecalciferol (VITAMIN D3) 1000 units CAPS, Take by mouth daily , Disp: , Rfl:     estradiol (ESTRACE) 0.1 mg/g vaginal cream, Insert into the vagina, Disp: , Rfl:     fluticasone (FLONASE) 50 mcg/act nasal spray, 1 SPRAY INTO EACH NOSTRIL DAILY, Disp: 48 g, Rfl: 1    Multiple Vitamins-Iron (MULTIVITAMIN/IRON PO), daily , Disp: , Rfl:     Omega-3 Fatty Acids (FISH OIL PO), Take 1,000 mg by mouth, Disp: , Rfl:     Vitals: Blood pressure 130/70, pulse 69, height 5' 4" (1.626 m), weight 79.8 kg (176 lb), SpO2 99 %, not currently breastfeeding. Body mass index is 30.21 kg/m². Wt Readings from Last 3 Encounters:   10/18/23 79.8 kg (176 lb)   10/16/23 78.9 kg (174 lb)   09/07/23 79.3 kg (174 lb 12.8 oz)     Vitals:    10/18/23 1340   Weight: 79.8 kg (176 lb)     BP Readings from Last 3 Encounters:   10/18/23 130/70   10/16/23 122/70   09/07/23 133/67       Physical Exam:  Physical Exam  Vitals and nursing note reviewed. Constitutional:       Appearance: Normal appearance. She is obese. HENT:      Right Ear: External ear normal.      Left Ear: External ear normal.   Eyes:      General: No scleral icterus. Right eye: No discharge. Left eye: No discharge. Cardiovascular:      Rate and Rhythm: Normal rate and regular rhythm. Pulses: Normal pulses. Heart sounds: Normal heart sounds. Pulmonary:      Effort: Pulmonary effort is normal. No respiratory distress. Breath sounds: Normal breath sounds. Abdominal:      General: Bowel sounds are normal. There is no distension. Palpations: Abdomen is soft. Musculoskeletal:      Right lower leg: No edema. Left lower leg: No edema. Skin:     General: Skin is warm and dry. Capillary Refill: Capillary refill takes less than 2 seconds. Neurological:      General: No focal deficit present. Mental Status: She is alert and oriented to person, place, and time. Mental status is at baseline. Psychiatric:         Mood and Affect: Mood normal.           Diagnostic Studies Review Cardio:      EKG:  Sinus rhythm       [facility]  Cardiology        "This note was completed in part utilizing [employer] direct voice recognition software. Grammatical errors, random word insertion, spelling mistakes, and incomplete sentences may be an occasional consequence of the system secondary to software limitations, ambient noise and hardware issues.     Please read the chart carefully and recognize, using context, where substitutions have occurred.   If you have any questions or concerns about the context, text or information contained within the body of this dictation, please contact myself, the provider, for further clarification."

## 2023-10-18 NOTE — H&P (VIEW-ONLY)
Progress Note - Cardiology Office  [facility]    Cleo Srivastava 79 y.o. female MRN: [id number]  : 1955  Encounter: [id number]      Assessment:     1. Pre-operative cardiovascular examination    2. Cardiac murmur    3. Mixed hyperlipidemia    4. Primary hypertension        Discussion Summary and Plan:  1. Hypertension: Blood pressures currently acceptable    2. Dyslipidemia: The 10-year ASCVD risk score (Lorraine ALANIS, et al., 2019) is: 7.5%    Values used to calculate the score:      Age: 79 years      Sex: Female      Is Non- : No      Diabetic: No      Tobacco smoker: No      Systolic Blood Pressure: 563 mmHg      Is BP treated: Yes      HDL Cholesterol: 74 mg/dL      Total Cholesterol: 194 mg/dL    -   Continue Lipitor 10 mg daily    3. Presurgical risk stratification: Patient is at low risk for perioperative complications. Blood pressures are stable and she does not possess history of heart failure or angina. Recent nuclear stress testing did not demonstrate any perfusion defects and echocardiogram demonstrated normal ejection fraction with no regional wall motion abnormality. Patient / Herlinda Rice was advised and educated to call our office  immediately if  patient has any new symptoms of chest pain/shortness of breath, near-syncope, syncope, light headedness sustained palpitations  or any other cardiovascular symptoms before their scheduled follow-up appointment. Office number was provided #[phone]. Please call [phone] if any questions. Counseling :  A description of the counseling. Goals and Barriers. Patient's ability to self care: Yes  Medication side effect reviewed with patient in detail and all their questions answered to their satisfaction. HPI :     Cleo Srivastava is a 79y.o. year old female who came for follow up.   Patient presents for follow-up of test results and also presurgical risk stratification as she is scheduled for craniotomy due to meningioma on 11/1/2023. Patient underwent nuclear stress testing on 6/7/2023, which patient exercised for 6 minutes achieving and completing stage II of a standard Ole protocol and 7.1 work METS. She did not experience any angina during the exercise. And testing was stopped as she reached target heart rate. Twelve-lead EKG did demonstrate horizontal ST depression seen in the inferior lateral leads of 2, 3, aVF, V4 through V6. Nuclear perfusion imaging demonstrated a mid to apical fixed defect which improved with proning images most likely due to breast attenuation. 2D echocardiogram performed on 5/17/2023 demonstrated normal ejection fraction of 60% with no regional wall motion abnormality and no valvular dysfunction. Patient remains active, she does not have any complaints of shortness of breath, chest pain or pressure. She has not noted any change in her ability to perform activities. Past history is significant for dyslipidemia and strong family history of coronary artery disease. Mother passed away at the age of 40 and father has known coronary problems. Review of Systems   Constitutional: Negative. Negative for activity change and fatigue. HENT: Negative. Negative for congestion, facial swelling, postnasal drip and sore throat. Eyes: Negative. Negative for photophobia and visual disturbance. Respiratory: Negative. Negative for chest tightness and shortness of breath. Cardiovascular: Negative. Negative for chest pain, palpitations and leg swelling. Gastrointestinal: Negative. Negative for abdominal distention, constipation, diarrhea, nausea and vomiting. Endocrine: Negative. Negative for polydipsia, polyphagia and polyuria. Genitourinary: Negative. Negative for difficulty urinating. Musculoskeletal: Negative. Skin: Negative. Neurological: Negative. Negative for dizziness, syncope and light-headedness.    Hematological: Negative. Psychiatric/Behavioral: Negative.          Historical Information   Past Medical History:   Diagnosis Date    Anxiety     Cellulitis     Chronic rhinitis     Last Assessed:6/6/2016    GERD without esophagitis     Last Assessed:6/6/2016    Hyperlipidemia     Hypometabolism     Vitamin D deficiency      Past Surgical History:   Procedure Laterality Date    COLONOSCOPY       Social History     Substance and Sexual Activity   Alcohol Use Yes    Comment: social     Social History     Substance and Sexual Activity   Drug Use Never     Social History     Tobacco Use   Smoking Status Former    Packs/day: 1.50    Years: 30.00    Total pack years: 45.00    Types: Cigarettes    Start date: 28    Quit date: 2008    Years since quitting: 15.8   Smokeless Tobacco Never     Family History:   Family History   Problem Relation Age of Onset    Atrial fibrillation Mother     Coronary artery disease Mother     Atrial fibrillation Father     Hypertension Father     Breast cancer Maternal Grandmother 60       Meds/Allergies     Allergies   Allergen Reactions    Monascus Purpureus Went Yeast Other (See Comments)    Sulfa Antibiotics Edema    Other Rash    Penicillins Rash       Current Outpatient Medications:     amLODIPine (NORVASC) 2.5 mg tablet, TAKE 1 TABLET (2.5 MG TOTAL) BY MOUTH DAILY, Disp: 90 tablet, Rfl: 1    Ascorbic Acid (VITAMIN C) 100 MG tablet, Take 100 mg by mouth daily, Disp: , Rfl:     atorvastatin (LIPITOR) 10 mg tablet, TAKE 1 TABLET DAILY, Disp: 90 tablet, Rfl: 1    Cholecalciferol (VITAMIN D3) 1000 units CAPS, Take by mouth daily , Disp: , Rfl:     estradiol (ESTRACE) 0.1 mg/g vaginal cream, Insert into the vagina, Disp: , Rfl:     fluticasone (FLONASE) 50 mcg/act nasal spray, 1 SPRAY INTO EACH NOSTRIL DAILY, Disp: 48 g, Rfl: 1    Multiple Vitamins-Iron (MULTIVITAMIN/IRON PO), daily , Disp: , Rfl:     Omega-3 Fatty Acids (FISH OIL PO), Take 1,000 mg by mouth, Disp: , Rfl:     Vitals: Blood pressure 130/70, pulse 69, height 5' 4" (1.626 m), weight 79.8 kg (176 lb), SpO2 99 %, not currently breastfeeding. Body mass index is 30.21 kg/m². Wt Readings from Last 3 Encounters:   10/18/23 79.8 kg (176 lb)   10/16/23 78.9 kg (174 lb)   09/07/23 79.3 kg (174 lb 12.8 oz)     Vitals:    10/18/23 1340   Weight: 79.8 kg (176 lb)     BP Readings from Last 3 Encounters:   10/18/23 130/70   10/16/23 122/70   09/07/23 133/67       Physical Exam:  Physical Exam  Vitals and nursing note reviewed. Constitutional:       Appearance: Normal appearance. She is obese. HENT:      Right Ear: External ear normal.      Left Ear: External ear normal.   Eyes:      General: No scleral icterus. Right eye: No discharge. Left eye: No discharge. Cardiovascular:      Rate and Rhythm: Normal rate and regular rhythm. Pulses: Normal pulses. Heart sounds: Normal heart sounds. Pulmonary:      Effort: Pulmonary effort is normal. No respiratory distress. Breath sounds: Normal breath sounds. Abdominal:      General: Bowel sounds are normal. There is no distension. Palpations: Abdomen is soft. Musculoskeletal:      Right lower leg: No edema. Left lower leg: No edema. Skin:     General: Skin is warm and dry. Capillary Refill: Capillary refill takes less than 2 seconds. Neurological:      General: No focal deficit present. Mental Status: She is alert and oriented to person, place, and time. Mental status is at baseline. Psychiatric:         Mood and Affect: Mood normal.           Diagnostic Studies Review Cardio:      EKG:  Sinus rhythm       [facility]  Cardiology        "This note was completed in part utilizing [employer] direct voice recognition software. Grammatical errors, random word insertion, spelling mistakes, and incomplete sentences may be an occasional consequence of the system secondary to software limitations, ambient noise and hardware issues.     Please read the chart carefully and recognize, using context, where substitutions have occurred.   If you have any questions or concerns about the context, text or information contained within the body of this dictation, please contact myself, the provider, for further clarification."

## 2023-10-19 LAB
ABO GROUP BLD: NORMAL
ALBUMIN SERPL-MCNC: 4.3 G/DL (ref 3.9–4.9)
ALBUMIN/GLOB SERPL: 1.8 {RATIO} (ref 1.2–2.2)
ALP SERPL-CCNC: 98 IU/L (ref 44–121)
ALT SERPL-CCNC: 24 IU/L (ref 0–32)
APTT PPP: 31 SEC (ref 24–33)
AST SERPL-CCNC: 24 IU/L (ref 0–40)
BASOPHILS # BLD AUTO: 0 X10E3/UL (ref 0–0.2)
BASOPHILS NFR BLD AUTO: 1 %
BILIRUB SERPL-MCNC: 0.7 MG/DL (ref 0–1.2)
BLD GP AB SCN SERPL QL: NEGATIVE
BUN SERPL-MCNC: 9 MG/DL (ref 8–27)
BUN/CREAT SERPL: 13 (ref 12–28)
CALCIUM SERPL-MCNC: 9.2 MG/DL (ref 8.7–10.3)
CHLORIDE SERPL-SCNC: 102 MMOL/L (ref 96–106)
CO2 SERPL-SCNC: 25 MMOL/L (ref 20–29)
CREAT SERPL-MCNC: 0.69 MG/DL (ref 0.57–1)
EGFR: 96 ML/MIN/1.73
EOSINOPHIL # BLD AUTO: 0.3 X10E3/UL (ref 0–0.4)
EOSINOPHIL NFR BLD AUTO: 6 %
ERYTHROCYTE [DISTWIDTH] IN BLOOD BY AUTOMATED COUNT: 12.3 % (ref 11.7–15.4)
EST. AVERAGE GLUCOSE BLD GHB EST-MCNC: 117 MG/DL
GLOBULIN SER-MCNC: 2.4 G/DL (ref 1.5–4.5)
GLUCOSE SERPL-MCNC: 95 MG/DL (ref 70–99)
HBA1C MFR BLD: 5.7 % (ref 4.8–5.6)
HCT VFR BLD AUTO: 41.5 % (ref 34–46.6)
HGB BLD-MCNC: 13.6 G/DL (ref 11.1–15.9)
IMM GRANULOCYTES # BLD: 0 X10E3/UL (ref 0–0.1)
IMM GRANULOCYTES NFR BLD: 0 %
INR PPP: 1.1 (ref 0.9–1.2)
LYMPHOCYTES # BLD AUTO: 1.2 X10E3/UL (ref 0.7–3.1)
LYMPHOCYTES NFR BLD AUTO: 28 %
MCH RBC QN AUTO: 31.9 PG (ref 26.6–33)
MCHC RBC AUTO-ENTMCNC: 32.8 G/DL (ref 31.5–35.7)
MCV RBC AUTO: 97 FL (ref 79–97)
MONOCYTES # BLD AUTO: 0.4 X10E3/UL (ref 0.1–0.9)
MONOCYTES NFR BLD AUTO: 10 %
NEUTROPHILS # BLD AUTO: 2.3 X10E3/UL (ref 1.4–7)
NEUTROPHILS NFR BLD AUTO: 55 %
PLATELET # BLD AUTO: 193 X10E3/UL (ref 150–450)
POTASSIUM SERPL-SCNC: 4.6 MMOL/L (ref 3.5–5.2)
PROT SERPL-MCNC: 6.7 G/DL (ref 6–8.5)
PROTHROMBIN TIME: 11.9 SEC (ref 9.1–12)
RBC # BLD AUTO: 4.26 X10E6/UL (ref 3.77–5.28)
RH BLD: POSITIVE
SODIUM SERPL-SCNC: 139 MMOL/L (ref 134–144)
WBC # BLD AUTO: 4.3 X10E3/UL (ref 3.4–10.8)

## 2023-10-19 NOTE — PRE-PROCEDURE INSTRUCTIONS
Pre-Surgery Instructions:   Medication Instructions    amLODIPine (NORVASC) 2.5 mg tablet Take day of surgery. Ascorbic Acid (VITAMIN C) 100 MG tablet Stop taking 7 days prior to surgery. atorvastatin (LIPITOR) 10 mg tablet Take day of surgery. Cholecalciferol (VITAMIN D3) 1000 units CAPS Stop taking 7 days prior to surgery. estradiol (ESTRACE) 0.1 mg/g vaginal cream Every Monday    fluticasone (FLONASE) 50 mcg/act nasal spray Take night before surgery    Multiple Vitamins-Iron (MULTIVITAMIN/IRON PO) Stop taking 7 days prior to surgery. Omega-3 Fatty Acids (FISH OIL PO) Stop taking 7 days prior to surgery. Medication instructions for day surgery reviewed. Please use only a sip of water to take your instructed medications. Avoid all over the counter vitamins, supplements and NSAIDS for one week prior to surgery per anesthesia guidelines. Tylenol is ok to take as needed. You will receive a call one business day prior to surgery with an arrival time and hospital directions. If your surgery is scheduled on a Monday, the hospital will be calling you on the Friday prior to your surgery. If you have not heard from anyone by 8pm, please call the hospital supervisor through the hospital  at [phone]. Zarina Martinez [phone]). Do not eat or drink anything after midnight the night before your surgery, including candy, mints, lifesavers, or chewing gum. Do not drink alcohol 24hrs before your surgery. Try not to smoke at least 24hrs before your surgery. Follow the pre surgery showering instructions as listed in the [facility] Surgical Experience Booklet” or otherwise provided by your surgeon's office. Do not shave the surgical area 24 hours before surgery. Do not apply any lotions, creams, including makeup, cologne, deodorant, or perfumes after showering on the day of your surgery. No contact lenses, eye make-up, or artificial eyelashes.  Remove nail polish, including gel polish, and any artificial, gel, or acrylic nails if possible. Remove all jewelry including rings and body piercing jewelry. Wear causal clothing that is easy to take on and off. Consider your type of surgery. Keep any valuables, jewelry, piercings at home. Please bring any specially ordered equipment (sling, braces) if indicated. Arrange for a responsible person to drive you to and from the hospital on the day of your surgery. Visitor Guidelines discussed. Call the surgeon's office with any new illnesses, exposures, or additional questions prior to surgery. Please reference your [facility] Surgical Experience Booklet” for additional information to prepare for your upcoming surgery.

## 2023-10-20 ENCOUNTER — APPOINTMENT (OUTPATIENT)
Dept: LAB | Facility: CLINIC | Age: 68
End: 2023-10-20
Payer: COMMERCIAL

## 2023-10-20 ENCOUNTER — LAB REQUISITION (OUTPATIENT)
Dept: LAB | Facility: HOSPITAL | Age: 68
End: 2023-10-20
Payer: COMMERCIAL

## 2023-10-20 DIAGNOSIS — D32.9 BENIGN NEOPLASM OF MENINGES, UNSPECIFIED (HCC): ICD-10-CM

## 2023-10-20 DIAGNOSIS — D32.9 MENINGIOMA (HCC): ICD-10-CM

## 2023-10-20 LAB
ABO GROUP BLD: NORMAL
BACTERIA UR QL AUTO: ABNORMAL /HPF
BILIRUB UR QL STRIP: NEGATIVE
BLD GP AB SCN SERPL QL: NEGATIVE
CLARITY UR: CLEAR
COLOR UR: ABNORMAL
GLUCOSE UR STRIP-MCNC: NEGATIVE MG/DL
HGB UR QL STRIP.AUTO: NEGATIVE
KETONES UR STRIP-MCNC: NEGATIVE MG/DL
LEUKOCYTE ESTERASE UR QL STRIP: ABNORMAL
MUCOUS THREADS UR QL AUTO: ABNORMAL
NITRITE UR QL STRIP: NEGATIVE
NON-SQ EPI CELLS URNS QL MICRO: ABNORMAL /HPF
PH UR STRIP.AUTO: 6.5 [PH]
PROT UR STRIP-MCNC: NEGATIVE MG/DL
RBC #/AREA URNS AUTO: ABNORMAL /HPF
RH BLD: POSITIVE
SP GR UR STRIP.AUTO: 1.01 (ref 1–1.03)
SPECIMEN EXPIRATION DATE: NORMAL
UROBILINOGEN UR STRIP-ACNC: <2 MG/DL
WBC #/AREA URNS AUTO: ABNORMAL /HPF

## 2023-10-20 PROCEDURE — 86901 BLOOD TYPING SEROLOGIC RH(D): CPT | Performed by: NEUROLOGICAL SURGERY

## 2023-10-20 PROCEDURE — 86900 BLOOD TYPING SEROLOGIC ABO: CPT | Performed by: NEUROLOGICAL SURGERY

## 2023-10-20 PROCEDURE — 36415 COLL VENOUS BLD VENIPUNCTURE: CPT

## 2023-10-20 PROCEDURE — 86850 RBC ANTIBODY SCREEN: CPT | Performed by: NEUROLOGICAL SURGERY

## 2023-10-23 ENCOUNTER — TELEPHONE (OUTPATIENT)
Age: 68
End: 2023-10-23

## 2023-10-23 NOTE — TELEPHONE ENCOUNTER
Cas Do from [address] pre-admission testing called requesting office visit notes from 10/16 get updated for surgery on Nov 1    Says Dr was waiting for Cardiac clearance and other test before she can clear pt for surgery on 11/1. Cas Do states that all testing results can now be viewed in Epic.

## 2023-10-23 NOTE — TELEPHONE ENCOUNTER
Dr. Amanda Miller will return on 10/25/23 and can addend her office note at that time.   Thank you,  Tom Drop, DO

## 2023-10-24 DIAGNOSIS — D32.9 MENINGIOMA (HCC): Primary | ICD-10-CM

## 2023-10-24 RX ORDER — CEPHALEXIN 500 MG/1
500 CAPSULE ORAL EVERY 12 HOURS SCHEDULED
Qty: 10 CAPSULE | Refills: 0 | Status: SHIPPED | OUTPATIENT
Start: 2023-10-24 | End: 2023-10-29

## 2023-10-31 ENCOUNTER — ANESTHESIA EVENT (INPATIENT)
Dept: PERIOP | Facility: HOSPITAL | Age: 68
DRG: 027 | End: 2023-10-31
Payer: COMMERCIAL

## 2023-11-01 ENCOUNTER — TELEPHONE (OUTPATIENT)
Dept: NEUROSURGERY | Facility: CLINIC | Age: 68
End: 2023-11-01

## 2023-11-01 ENCOUNTER — HOSPITAL ENCOUNTER (INPATIENT)
Facility: HOSPITAL | Age: 68
LOS: 2 days | Discharge: HOME | DRG: 027 | End: 2023-11-03
Attending: NEUROLOGICAL SURGERY | Admitting: NEUROLOGICAL SURGERY
Payer: COMMERCIAL

## 2023-11-01 ENCOUNTER — ANESTHESIA (INPATIENT)
Dept: PERIOP | Facility: HOSPITAL | Age: 68
DRG: 027 | End: 2023-11-01
Payer: COMMERCIAL

## 2023-11-01 DIAGNOSIS — Z98.890 POSTOPERATIVE STATE: ICD-10-CM

## 2023-11-01 DIAGNOSIS — D32.9 MENINGIOMA (HCC): Primary | ICD-10-CM

## 2023-11-01 PROBLEM — K21.9 GERD (GASTROESOPHAGEAL REFLUX DISEASE): Status: ACTIVE | Noted: 2023-11-01

## 2023-11-01 LAB
ABO GROUP BLD: NORMAL
FLUAV RNA RESP QL NAA+PROBE: NEGATIVE
FLUBV RNA RESP QL NAA+PROBE: NEGATIVE
GLUCOSE SERPL-MCNC: 113 MG/DL (ref 65–140)
RH BLD: POSITIVE
RSV RNA RESP QL NAA+PROBE: NEGATIVE
SARS-COV-2 RNA RESP QL NAA+PROBE: NEGATIVE

## 2023-11-01 PROCEDURE — 88331 PATH CONSLTJ SURG 1 BLK 1SPC: CPT | Performed by: PATHOLOGY

## 2023-11-01 PROCEDURE — 82948 REAGENT STRIP/BLOOD GLUCOSE: CPT

## 2023-11-01 PROCEDURE — 69990 MICROSURGERY ADD-ON: CPT | Performed by: NEUROLOGICAL SURGERY

## 2023-11-01 PROCEDURE — 69990 MICROSURGERY ADD-ON: CPT | Performed by: PHYSICIAN ASSISTANT

## 2023-11-01 PROCEDURE — 88307 TISSUE EXAM BY PATHOLOGIST: CPT | Performed by: PATHOLOGY

## 2023-11-01 PROCEDURE — C1713 ANCHOR/SCREW BN/BN,TIS/BN: HCPCS | Performed by: NEUROLOGICAL SURGERY

## 2023-11-01 PROCEDURE — 88331 PATH CONSLTJ SURG 1 BLK 1SPC: CPT | Performed by: NEUROLOGICAL SURGERY

## 2023-11-01 PROCEDURE — 00B00ZZ EXCISION OF BRAIN, OPEN APPROACH: ICD-10-PCS | Performed by: NEUROLOGICAL SURGERY

## 2023-11-01 PROCEDURE — 61512 CRNEC TREPH EXC MNGIOMA STTL: CPT | Performed by: PHYSICIAN ASSISTANT

## 2023-11-01 PROCEDURE — C1781 MESH (IMPLANTABLE): HCPCS | Performed by: NEUROLOGICAL SURGERY

## 2023-11-01 PROCEDURE — 61512 CRNEC TREPH EXC MNGIOMA STTL: CPT | Performed by: NEUROLOGICAL SURGERY

## 2023-11-01 PROCEDURE — 0241U HB NFCT DS VIR RESP RNA 4 TRGT: CPT | Performed by: NEUROLOGICAL SURGERY

## 2023-11-01 PROCEDURE — 88360 TUMOR IMMUNOHISTOCHEM/MANUAL: CPT | Performed by: PATHOLOGY

## 2023-11-01 DEVICE — IMPLANTABLE DEVICE
Type: IMPLANTABLE DEVICE | Site: BRAIN | Status: FUNCTIONAL
Brand: THINFLAP SYSTEM

## 2023-11-01 DEVICE — INTEGRA® DURAFLEX™ SUTURABLE DURAL GRAFT 6 CM X 8 CM
Type: IMPLANTABLE DEVICE | Site: BRAIN | Status: FUNCTIONAL
Brand: INTEGRA® DURAFLEX®

## 2023-11-01 DEVICE — IMPLANTABLE DEVICE
Type: IMPLANTABLE DEVICE | Site: BRAIN | Status: FUNCTIONAL
Brand: THINFLAP

## 2023-11-01 RX ORDER — ROCURONIUM BROMIDE 10 MG/ML
INJECTION, SOLUTION INTRAVENOUS AS NEEDED
Status: DISCONTINUED | OUTPATIENT
Start: 2023-11-01 | End: 2023-11-01

## 2023-11-01 RX ORDER — LIDOCAINE HYDROCHLORIDE AND EPINEPHRINE 10; 10 MG/ML; UG/ML
INJECTION, SOLUTION INFILTRATION; PERINEURAL AS NEEDED
Status: DISCONTINUED | OUTPATIENT
Start: 2023-11-01 | End: 2023-11-01 | Stop reason: HOSPADM

## 2023-11-01 RX ORDER — HYDROMORPHONE HCL/PF 1 MG/ML
SYRINGE (ML) INJECTION AS NEEDED
Status: DISCONTINUED | OUTPATIENT
Start: 2023-11-01 | End: 2023-11-01

## 2023-11-01 RX ORDER — ONDANSETRON 2 MG/ML
4 INJECTION INTRAMUSCULAR; INTRAVENOUS EVERY 6 HOURS PRN
Status: DISCONTINUED | OUTPATIENT
Start: 2023-11-01 | End: 2023-11-02

## 2023-11-01 RX ORDER — CEFAZOLIN SODIUM 2 G/50ML
2000 SOLUTION INTRAVENOUS EVERY 8 HOURS
Status: COMPLETED | OUTPATIENT
Start: 2023-11-01 | End: 2023-11-02

## 2023-11-01 RX ORDER — DEXAMETHASONE 4 MG/1
4 TABLET ORAL EVERY 8 HOURS SCHEDULED
Status: DISCONTINUED | OUTPATIENT
Start: 2023-11-03 | End: 2023-11-03 | Stop reason: HOSPADM

## 2023-11-01 RX ORDER — METOCLOPRAMIDE HYDROCHLORIDE 5 MG/ML
10 INJECTION INTRAMUSCULAR; INTRAVENOUS EVERY 6 HOURS PRN
Status: DISCONTINUED | OUTPATIENT
Start: 2023-11-01 | End: 2023-11-02

## 2023-11-01 RX ORDER — HYDROMORPHONE HCL IN WATER/PF 6 MG/30 ML
0.2 PATIENT CONTROLLED ANALGESIA SYRINGE INTRAVENOUS EVERY 4 HOURS PRN
Status: DISCONTINUED | OUTPATIENT
Start: 2023-11-01 | End: 2023-11-02

## 2023-11-01 RX ORDER — FLUTICASONE PROPIONATE 50 MCG
1 SPRAY, SUSPENSION (ML) NASAL DAILY
Status: DISCONTINUED | OUTPATIENT
Start: 2023-11-02 | End: 2023-11-03 | Stop reason: HOSPADM

## 2023-11-01 RX ORDER — SODIUM CHLORIDE, SODIUM LACTATE, POTASSIUM CHLORIDE, CALCIUM CHLORIDE 600; 310; 30; 20 MG/100ML; MG/100ML; MG/100ML; MG/100ML
125 INJECTION, SOLUTION INTRAVENOUS CONTINUOUS
Status: DISCONTINUED | OUTPATIENT
Start: 2023-11-01 | End: 2023-11-01

## 2023-11-01 RX ORDER — CHLORHEXIDINE GLUCONATE ORAL RINSE 1.2 MG/ML
15 SOLUTION DENTAL ONCE
Status: COMPLETED | OUTPATIENT
Start: 2023-11-01 | End: 2023-11-01

## 2023-11-01 RX ORDER — GLYCOPYRROLATE 0.2 MG/ML
INJECTION INTRAMUSCULAR; INTRAVENOUS AS NEEDED
Status: DISCONTINUED | OUTPATIENT
Start: 2023-11-01 | End: 2023-11-01

## 2023-11-01 RX ORDER — SODIUM CHLORIDE, SODIUM LACTATE, POTASSIUM CHLORIDE, CALCIUM CHLORIDE 600; 310; 30; 20 MG/100ML; MG/100ML; MG/100ML; MG/100ML
INJECTION, SOLUTION INTRAVENOUS CONTINUOUS PRN
Status: DISCONTINUED | OUTPATIENT
Start: 2023-11-01 | End: 2023-11-01

## 2023-11-01 RX ORDER — BISACODYL 10 MG
10 SUPPOSITORY, RECTAL RECTAL DAILY PRN
Status: DISCONTINUED | OUTPATIENT
Start: 2023-11-01 | End: 2023-11-03 | Stop reason: HOSPADM

## 2023-11-01 RX ORDER — FENTANYL CITRATE/PF 50 MCG/ML
25 SYRINGE (ML) INJECTION
Status: DISCONTINUED | OUTPATIENT
Start: 2023-11-01 | End: 2023-11-01 | Stop reason: HOSPADM

## 2023-11-01 RX ORDER — HYDROMORPHONE HCL/PF 1 MG/ML
0.5 SYRINGE (ML) INJECTION
Status: DISCONTINUED | OUTPATIENT
Start: 2023-11-01 | End: 2023-11-01 | Stop reason: HOSPADM

## 2023-11-01 RX ORDER — DEXAMETHASONE 2 MG/1
2 TABLET ORAL EVERY 6 HOURS SCHEDULED
Status: DISCONTINUED | OUTPATIENT
Start: 2023-11-05 | End: 2023-11-03 | Stop reason: HOSPADM

## 2023-11-01 RX ORDER — PROMETHAZINE HYDROCHLORIDE 25 MG/ML
6.25 INJECTION, SOLUTION INTRAMUSCULAR; INTRAVENOUS
Status: DISCONTINUED | OUTPATIENT
Start: 2023-11-01 | End: 2023-11-01 | Stop reason: HOSPADM

## 2023-11-01 RX ORDER — AMLODIPINE BESYLATE 2.5 MG/1
2.5 TABLET ORAL DAILY
Status: DISCONTINUED | OUTPATIENT
Start: 2023-11-02 | End: 2023-11-03 | Stop reason: HOSPADM

## 2023-11-01 RX ORDER — DEXAMETHASONE SODIUM PHOSPHATE 10 MG/ML
INJECTION, SOLUTION INTRAMUSCULAR; INTRAVENOUS AS NEEDED
Status: DISCONTINUED | OUTPATIENT
Start: 2023-11-01 | End: 2023-11-01

## 2023-11-01 RX ORDER — SODIUM CHLORIDE 9 MG/ML
75 INJECTION, SOLUTION INTRAVENOUS CONTINUOUS
Status: DISCONTINUED | OUTPATIENT
Start: 2023-11-01 | End: 2023-11-02

## 2023-11-01 RX ORDER — DEXAMETHASONE 2 MG/1
2 TABLET ORAL EVERY 12 HOURS SCHEDULED
Status: DISCONTINUED | OUTPATIENT
Start: 2023-11-09 | End: 2023-11-03 | Stop reason: HOSPADM

## 2023-11-01 RX ORDER — DEXAMETHASONE 4 MG/1
4 TABLET ORAL EVERY 6 HOURS SCHEDULED
Status: DISCONTINUED | OUTPATIENT
Start: 2023-11-01 | End: 2023-11-03 | Stop reason: HOSPADM

## 2023-11-01 RX ORDER — ONDANSETRON 2 MG/ML
4 INJECTION INTRAMUSCULAR; INTRAVENOUS ONCE AS NEEDED
Status: DISCONTINUED | OUTPATIENT
Start: 2023-11-01 | End: 2023-11-01 | Stop reason: HOSPADM

## 2023-11-01 RX ORDER — MULTIVIT WITH MINERALS/LUTEIN
125 TABLET ORAL DAILY
Status: DISCONTINUED | OUTPATIENT
Start: 2023-11-02 | End: 2023-11-03 | Stop reason: HOSPADM

## 2023-11-01 RX ORDER — ONDANSETRON 2 MG/ML
INJECTION INTRAMUSCULAR; INTRAVENOUS AS NEEDED
Status: DISCONTINUED | OUTPATIENT
Start: 2023-11-01 | End: 2023-11-01

## 2023-11-01 RX ORDER — LEVETIRACETAM 500 MG/1
500 TABLET ORAL EVERY 12 HOURS SCHEDULED
Status: DISCONTINUED | OUTPATIENT
Start: 2023-11-01 | End: 2023-11-03 | Stop reason: HOSPADM

## 2023-11-01 RX ORDER — ATORVASTATIN CALCIUM 10 MG/1
10 TABLET, FILM COATED ORAL
Status: DISCONTINUED | OUTPATIENT
Start: 2023-11-02 | End: 2023-11-03 | Stop reason: HOSPADM

## 2023-11-01 RX ORDER — DEXAMETHASONE 2 MG/1
2 TABLET ORAL
Status: DISCONTINUED | OUTPATIENT
Start: 2023-11-12 | End: 2023-11-03 | Stop reason: HOSPADM

## 2023-11-01 RX ORDER — DEXAMETHASONE 2 MG/1
2 TABLET ORAL EVERY 8 HOURS SCHEDULED
Status: DISCONTINUED | OUTPATIENT
Start: 2023-11-08 | End: 2023-11-03 | Stop reason: HOSPADM

## 2023-11-01 RX ORDER — SODIUM CHLORIDE 9 MG/ML
INJECTION, SOLUTION INTRAVENOUS CONTINUOUS PRN
Status: DISCONTINUED | OUTPATIENT
Start: 2023-11-01 | End: 2023-11-01

## 2023-11-01 RX ORDER — LIDOCAINE HYDROCHLORIDE 10 MG/ML
INJECTION, SOLUTION EPIDURAL; INFILTRATION; INTRACAUDAL; PERINEURAL AS NEEDED
Status: DISCONTINUED | OUTPATIENT
Start: 2023-11-01 | End: 2023-11-01 | Stop reason: HOSPADM

## 2023-11-01 RX ORDER — PHENYLEPHRINE HCL IN 0.9% NACL 1 MG/10 ML
SYRINGE (ML) INTRAVENOUS AS NEEDED
Status: DISCONTINUED | OUTPATIENT
Start: 2023-11-01 | End: 2023-11-01

## 2023-11-01 RX ORDER — ACETAMINOPHEN 325 MG/1
975 TABLET ORAL EVERY 8 HOURS SCHEDULED
Status: DISCONTINUED | OUTPATIENT
Start: 2023-11-01 | End: 2023-11-03 | Stop reason: HOSPADM

## 2023-11-01 RX ORDER — DOCUSATE SODIUM 100 MG/1
100 CAPSULE, LIQUID FILLED ORAL 2 TIMES DAILY
Status: DISCONTINUED | OUTPATIENT
Start: 2023-11-01 | End: 2023-11-03 | Stop reason: HOSPADM

## 2023-11-01 RX ORDER — LEVETIRACETAM 500 MG/5ML
INJECTION, SOLUTION, CONCENTRATE INTRAVENOUS AS NEEDED
Status: DISCONTINUED | OUTPATIENT
Start: 2023-11-01 | End: 2023-11-01

## 2023-11-01 RX ORDER — PROPOFOL 10 MG/ML
INJECTION, EMULSION INTRAVENOUS AS NEEDED
Status: DISCONTINUED | OUTPATIENT
Start: 2023-11-01 | End: 2023-11-01

## 2023-11-01 RX ORDER — FENTANYL CITRATE 50 UG/ML
INJECTION, SOLUTION INTRAMUSCULAR; INTRAVENOUS AS NEEDED
Status: DISCONTINUED | OUTPATIENT
Start: 2023-11-01 | End: 2023-11-01

## 2023-11-01 RX ORDER — BUPIVACAINE HYDROCHLORIDE 5 MG/ML
INJECTION, SOLUTION EPIDURAL; INTRACAUDAL AS NEEDED
Status: DISCONTINUED | OUTPATIENT
Start: 2023-11-01 | End: 2023-11-01 | Stop reason: HOSPADM

## 2023-11-01 RX ORDER — MAGNESIUM HYDROXIDE 1200 MG/15ML
LIQUID ORAL AS NEEDED
Status: DISCONTINUED | OUTPATIENT
Start: 2023-11-01 | End: 2023-11-01 | Stop reason: HOSPADM

## 2023-11-01 RX ORDER — SENNOSIDES 8.6 MG
1 TABLET ORAL DAILY
Status: DISCONTINUED | OUTPATIENT
Start: 2023-11-02 | End: 2023-11-03 | Stop reason: HOSPADM

## 2023-11-01 RX ORDER — OXYCODONE HYDROCHLORIDE 5 MG/1
5 TABLET ORAL EVERY 4 HOURS PRN
Status: DISCONTINUED | OUTPATIENT
Start: 2023-11-01 | End: 2023-11-03 | Stop reason: HOSPADM

## 2023-11-01 RX ORDER — POLYETHYLENE GLYCOL 3350 17 G/17G
17 POWDER, FOR SOLUTION ORAL DAILY
Status: DISCONTINUED | OUTPATIENT
Start: 2023-11-02 | End: 2023-11-03 | Stop reason: HOSPADM

## 2023-11-01 RX ORDER — PROPOFOL 10 MG/ML
INJECTION, EMULSION INTRAVENOUS CONTINUOUS PRN
Status: DISCONTINUED | OUTPATIENT
Start: 2023-11-01 | End: 2023-11-01

## 2023-11-01 RX ORDER — LIDOCAINE HYDROCHLORIDE 10 MG/ML
0.5 INJECTION, SOLUTION EPIDURAL; INFILTRATION; INTRACAUDAL; PERINEURAL ONCE
Status: DISCONTINUED | OUTPATIENT
Start: 2023-11-01 | End: 2023-11-01 | Stop reason: HOSPADM

## 2023-11-01 RX ORDER — VANCOMYCIN HYDROCHLORIDE 1 G/200ML
1000 INJECTION, SOLUTION INTRAVENOUS ONCE
Status: COMPLETED | OUTPATIENT
Start: 2023-11-01 | End: 2023-11-01

## 2023-11-01 RX ORDER — MIDAZOLAM HYDROCHLORIDE 2 MG/2ML
INJECTION, SOLUTION INTRAMUSCULAR; INTRAVENOUS AS NEEDED
Status: DISCONTINUED | OUTPATIENT
Start: 2023-11-01 | End: 2023-11-01

## 2023-11-01 RX ADMIN — Medication 100 MCG: at 09:53

## 2023-11-01 RX ADMIN — METOCLOPRAMIDE 10 MG: 5 INJECTION, SOLUTION INTRAMUSCULAR; INTRAVENOUS at 17:48

## 2023-11-01 RX ADMIN — HYDROMORPHONE HYDROCHLORIDE 1 MG: 1 INJECTION, SOLUTION INTRAMUSCULAR; INTRAVENOUS; SUBCUTANEOUS at 09:11

## 2023-11-01 RX ADMIN — GLYCOPYRROLATE 0.2 MG: 0.2 INJECTION, SOLUTION INTRAMUSCULAR; INTRAVENOUS at 09:44

## 2023-11-01 RX ADMIN — HYDROMORPHONE HYDROCHLORIDE 0.5 MG: 1 INJECTION, SOLUTION INTRAMUSCULAR; INTRAVENOUS; SUBCUTANEOUS at 11:21

## 2023-11-01 RX ADMIN — MIDAZOLAM 2 MG: 1 INJECTION INTRAMUSCULAR; INTRAVENOUS at 08:22

## 2023-11-01 RX ADMIN — SODIUM CHLORIDE 75 ML/HR: 0.9 INJECTION, SOLUTION INTRAVENOUS at 17:02

## 2023-11-01 RX ADMIN — LEVETIRACETAM 500 MG: 500 TABLET, FILM COATED ORAL at 21:19

## 2023-11-01 RX ADMIN — ONDANSETRON 4 MG: 2 INJECTION INTRAMUSCULAR; INTRAVENOUS at 17:01

## 2023-11-01 RX ADMIN — FENTANYL CITRATE 50 MCG: 50 INJECTION, SOLUTION INTRAMUSCULAR; INTRAVENOUS at 08:43

## 2023-11-01 RX ADMIN — DOCUSATE SODIUM 100 MG: 100 CAPSULE, LIQUID FILLED ORAL at 17:01

## 2023-11-01 RX ADMIN — Medication 100 MCG: at 08:53

## 2023-11-01 RX ADMIN — ONDANSETRON 4 MG: 2 INJECTION INTRAMUSCULAR; INTRAVENOUS at 11:47

## 2023-11-01 RX ADMIN — SUGAMMADEX 320 MG: 100 INJECTION, SOLUTION INTRAVENOUS at 12:30

## 2023-11-01 RX ADMIN — PROPOFOL 50 MG: 10 INJECTION, EMULSION INTRAVENOUS at 09:01

## 2023-11-01 RX ADMIN — CHLORHEXIDINE GLUCONATE 15 ML: 1.2 SOLUTION ORAL at 07:04

## 2023-11-01 RX ADMIN — ROCURONIUM BROMIDE 30 MG: 10 INJECTION, SOLUTION INTRAVENOUS at 10:08

## 2023-11-01 RX ADMIN — CEFAZOLIN SODIUM 2000 MG: 2 SOLUTION INTRAVENOUS at 17:01

## 2023-11-01 RX ADMIN — FENTANYL CITRATE 50 MCG: 50 INJECTION, SOLUTION INTRAMUSCULAR; INTRAVENOUS at 09:11

## 2023-11-01 RX ADMIN — DEXAMETHASONE 4 MG: 4 TABLET ORAL at 17:01

## 2023-11-01 RX ADMIN — Medication 100 MCG: at 09:57

## 2023-11-01 RX ADMIN — Medication 200 MCG: at 09:31

## 2023-11-01 RX ADMIN — ROCURONIUM BROMIDE 20 MG: 10 INJECTION, SOLUTION INTRAVENOUS at 09:43

## 2023-11-01 RX ADMIN — ACETAMINOPHEN 975 MG: 325 TABLET, FILM COATED ORAL at 17:01

## 2023-11-01 RX ADMIN — VANCOMYCIN HYDROCHLORIDE 1000 MG: 1 INJECTION, SOLUTION INTRAVENOUS at 09:15

## 2023-11-01 RX ADMIN — Medication 50 MCG: at 08:59

## 2023-11-01 RX ADMIN — PROPOFOL 160 MG: 10 INJECTION, EMULSION INTRAVENOUS at 08:46

## 2023-11-01 RX ADMIN — SODIUM CHLORIDE: 0.9 INJECTION, SOLUTION INTRAVENOUS at 08:18

## 2023-11-01 RX ADMIN — VANCOMYCIN HYDROCHLORIDE 1000 MG: 1 INJECTION, POWDER, LYOPHILIZED, FOR SOLUTION INTRAVENOUS at 09:15

## 2023-11-01 RX ADMIN — LEVETIRACETAM 1000 MG: 100 INJECTION, SOLUTION INTRAVENOUS at 09:07

## 2023-11-01 RX ADMIN — LIDOCAINE HYDROCHLORIDE 50 ML: 10 INJECTION, SOLUTION EPIDURAL; INFILTRATION; INTRACAUDAL; PERINEURAL at 08:46

## 2023-11-01 RX ADMIN — PROPOFOL 50 MCG/KG/MIN: 10 INJECTION, EMULSION INTRAVENOUS at 08:59

## 2023-11-01 RX ADMIN — ROCURONIUM BROMIDE 20 MG: 10 INJECTION, SOLUTION INTRAVENOUS at 11:38

## 2023-11-01 RX ADMIN — PHENYLEPHRINE HYDROCHLORIDE 30 MCG/MIN: 10 INJECTION INTRAVENOUS at 08:56

## 2023-11-01 RX ADMIN — ROCURONIUM BROMIDE 30 MG: 10 INJECTION, SOLUTION INTRAVENOUS at 10:54

## 2023-11-01 RX ADMIN — ROCURONIUM BROMIDE 50 MG: 10 INJECTION, SOLUTION INTRAVENOUS at 08:46

## 2023-11-01 RX ADMIN — DEXAMETHASONE SODIUM PHOSPHATE 10 MG: 10 INJECTION, SOLUTION INTRAMUSCULAR; INTRAVENOUS at 09:07

## 2023-11-01 NOTE — ANESTHESIA PREPROCEDURE EVALUATION
Procedure:  Left frontal CRANIOTOMY IMAGE-GUIDED FOR TUMOR (Left: Head)    Relevant Problems   CARDIO   (+) Mixed hyperlipidemia   (+) Primary hypertension      GI/HEPATIC   (+) GERD (gastroesophageal reflux disease)      MUSCULOSKELETAL   (+) Lateral epicondylitis of left elbow      Respiratory   (+) Chronic sinusitis   (+) Seasonal allergic rhinitis      Nervous and Auditory   (+) Meningioma (HCC)        Physical Exam    Airway    Mallampati score: III  TM Distance: >3 FB  Neck ROM: full     Dental       Cardiovascular      Pulmonary      Other Findings      Anesthesia Plan  ASA Score- 2     Anesthesia Type- general with ASA Monitors. Additional Monitors: arterial line. Airway Plan: ETT. Plan Factors-Exercise tolerance (METS): >4 METS. Chart reviewed. Existing labs reviewed. Patient summary reviewed. Patient is not a current smoker. Induction- intravenous. Postoperative Plan- Plan for postoperative opioid use. Planned trial extubation    Informed Consent- Anesthetic plan and risks discussed with patient. I personally reviewed this patient with the CRNA. Discussed and agreed on the Anesthesia Plan with the CRNA. Samul Stu           VITALS  /84   Pulse 78   Temp 98.4 °F (36.9 °C)   Resp 16   SpO2 98%   BP Readings from Last 3 Encounters:   11/01/23 152/84   10/18/23 130/70   10/16/23 122/70     LABS  Results from Last 12 Months   Lab Units 10/18/23  0706 07/26/23  0709 02/10/23  1257   SODIUM mmol/L 139 140 137   POTASSIUM mmol/L 4.6 4.3 3.9   CHLORIDE mmol/L 102 103 101   CO2 mmol/L 25 24 26   ANION GAP mmol/L  --   --  10   BUN mg/dL 9 12 8   CREATININE mg/dL 0.69 0.69 0.66   CALCIUM mg/dL  --   --  9.8   GLUCOSE RANDOM mg/dL 95 96 127   AST IU/L 24 23 24   ALT IU/L 24 21 25   ALK PHOS U/L  --   --  91   TOTAL BILIRUBIN mg/dL 0.7 0.8 0.70   ALBUMIN g/dL 4.3 4.1 4.3     Results from Last 12 Months   Lab Units 10/18/23  0706 07/26/23  0709   WHITE BLOOD CELL COUNT. x10E3/uL 4.3 4.9   HEMOGLOBIN. g/dL 13.6 13.4   HEMATOCRIT. % 41.5 39.5   PLATELETS. X96S1/ 218     Results from Last 12 Months   Lab Units 10/18/23  0706   APTT sec 31   INR  1.1     ECG  See below  No results found for this or any previous visit (from the past 4464 hour(s)). No results found for this or any previous visit. ECHOCARDIOGRAPHY, OTHER CARDIAC TESTING, AND IMAGING    Interpretation Summary         Stress ECG: Horizontal ST depression (II, III, aVF, V4, V5 and V6) is noted. There were no arrhythmias during recovery. . The ECG was positive for ischemia. The stress ECG is equivocal for ischemia after maximal exercise, without reproduction of symptoms. Perfusion: There is a left ventricular perfusion defect that is small in size with mild reduction in uptake present in the mid to apical apex location(s) that is predominantly fixed. It is most likely due to breast attenuation artifact as it improves in prone images. Stress Function: Left ventricular function post-stress is normal. Stress ejection fraction is 74 %. Stress Combined Conclusion: The ECG and SPECT imaging portions of the stress study are discordant. There is image artifact, without diagnostic evidence for perfusion abnormality. EF 74%       ANESTHESIA RISK-BENEFIT DISCUSSION  BENEFITS INCLUDE THE FOLLOWING ([address] [id number], PMID [id number]): (1) Involvement of a dedicated anesthesia team reduces mortality and morbidity for major surgeries, (2) The team provides as much analgesia/sedation/amnesia/akinesia as is reasonably possible, and (3) The team strives to reduce discomfort and distress as much as reasonably achievable. RISKS (AND PLANS TO MITIGATE RISKS) INCLUDES THE FOLLOWING:    Neurologic Assessment: IntraOp awareness (Risk is ~1:1,000 - 1:14,000; PMID [id number]), Stroke (Risk ~<0.1-2% for most cases; PMID [id number]), nerve injury, vision loss, and POCD.      Airway and Pulmonary Assessment: Dental or mouth injury, throat pain, critical hypoxia, pneumothorax, prolonged intubation, post-op respiratory compromise. Airway/Intubation risks and information: No prior advanced airway notes in [facility] EMR  Major ARISCAT risk factors include: Case surgical time estimated at >2hrs: 1pt, yielding a (Score 0-1= Low risk, 1.6%). Cardiovascular Assessment: Hypotension, arrhythmias, and suzie-op cardiac injury (MACE). In cases where specialized vascular access is needed, then additional risks including bleeding, infection, or injury to adjacent structures. If a bypass circuit is needed then risk of stroke, blood clot, and vasoplegia. Signs of active, severe cardiac instability: none  [place]'s RCRI score items: none, yielding an RCRI Score of 0= 0.4% risk of MACE  Are suzie-op or intra-op beta blockers indicated? (PMID O1082311): no  FEN/GI Assessment: Aspiration (Approximately 0.5% risk per the IRIS trial) and PONV (10-80% depending on Apfel criteria). Does the patient meet ASA NPO guidelines?: Yes  Adverse Drug Risk Assessment: Allergic reactions, overdoses, drug-drug interactions, injury to a fetus or  in pregnant or breastfeeding patients, increased risk of injury or accident if performing potentially hazardous tasks before anesthetic medications have been fully excreted/metabolized.   Recent medications relevant to anesthetic plan: See MAR or Med Review  Personal or family history of anesthesia complications: no  Pregnancy Status: Not applicable  Mortality risks associated with anesthesia based on ASA-PS (PMID [id number]): ASA-PS II: Estimated risk  1:20,000

## 2023-11-01 NOTE — OP NOTE
OPERATIVE REPORT  PATIENT NAME: Saturnino Serrato    :  1955  MRN: [id number]  Pt Location: BE OR ROOM 17    SURGERY DATE: 2023    Surgeon(s) and Role:     * Miko Reyes MD - Primary     * Severo Henderson PA-C - Observing    Preop Diagnosis:  Meningioma ([address]) [D32.9]    Post-Op Diagnosis Codes:     * Meningioma ([address]) [D32.9]    Procedure(s):  Left - Left frontal CRANIOTOMY IMAGE-GUIDED FOR TUMOR    Specimen(s):  ID Type Source Tests Collected by Time Destination   1 : LEFT FRONTAL MASS Tissue Brain TISSUE EXAM Miko Reyes MD 2023 1044    2 : LEFT FRONTAL MASS Tissue Brain TISSUE EXAM Miko Reyes MD 2023 1044        Estimated Blood Loss:   20 mL    Drains:  [REMOVED] Urethral Catheter Latex 16 Fr. (Removed)   Number of days: 0       Anesthesia Type:   General    Operative Indications:  Meningioma ([address]) [D32.9]      Operative Findings:  Epithelial neoplasm likely meningioma  Some regions of brain invasion    Complications:   None    Procedure and Technique:  After adequate general endotracheal anesthesia the patient was placed supine on the operating table. A bolster was placed under the left shoulder. The head was placed into 3 point head fixation device and the stealth neuro navigational system was registered and calibrated. Image guidance with the use of the [address] was used throughout this case. Images were carefully loaded into the system and used for preoperative planning as well as intraoperative guidance it was critically useful for navigation and avoidance of critically important structures. The hair was clipped in the left frontal region and the scalp was prepped with Betadine soap then DuraPrep. Double layer drapes were placed in normal fashion and a Betadine impregnated sticky drape was placed over these.       A time-out was called and all parameters a time-out were followed    The skin in the left frontal just posterior to the hairline region was injected with 1% lidocaine with 1 100,000 epinephrine. A number 15 Blade was used to incise the skin. A 8  cm incision was made. Bovie and bipolar were used throughout the procedure to maintain hemostasis. The Bovie and a cutting setting was used to divide the tissues through the galea. Rene clips were placed on the scalp edges. A cerebellar style weitlater retractor was placed. The position of the incision and the position of the cranial flap were carefully marked. A single bur hole was placed. And a full craniotomy flap was turned. The dura was not breached in this maneuver. The dura was then carefully dissected and a curvilinear fashion circumferentially around the superficial aspect of the tumor. A tedious microscopic dissection then ensued. The tumor was invasive in the frontal region and one of the gyri was intimately involved in the tumor. This was carefully dissected. An underlying arborizing large cortical vein was tightly adherent but was preserved through a tedious dissection. All visible tumor was removed. The dural edges were coagulated so as to reduce the risk of local recurrence in this region. Copious quantities of irrigation were used to cleanse out the region Floseal was placed as well as Surgicel in the resection bed. The dura was then reconstructed with a dural substitute. This was sutured into place with interrupted inverted 4-0 Nurolon sutures. DuraSeal was then placed over this. The bone flap was replaced with a Biomet bone fixation system. The plates that were to be placed on the frontal forehead were depressed into the outer layer of the cortex or as to allow them to be less visible. Additional DuraSeal was placed in the cranial intersection between the native bone and the cranial flap. Copious quantities of antibiotic irrigation were used to cleanse out the region.   The galea was approximated with interrupted inverted 2-0 Vicryl suture. The skin was closed with a running 4-0 Monocryl. Histocryl was placed over this. The patient was then taken out of pins extubated and taken to the recovery room having tolerated the procedure well. 0.5% bupivacaine was injected in the surrounding tissues. I was present for the entire procedure.     Patient Disposition:  PACU         SIGNATURE: Shelley Gardner MD  DATE: November 1, 2023  TIME: 12:55 PM

## 2023-11-01 NOTE — ANESTHESIA PROCEDURE NOTES
Arterial Line Insertion    Performed by: Tracie Santana MD PhD  Authorized by: Tracie Santana MD PhD  Consent: Written consent obtained. Consent given by: patient  Patient understanding: patient states understanding of the procedure being performed  Patient consent: the patient's understanding of the procedure matches consent given  Procedure consent: procedure consent matches procedure scheduled  Relevant documents: relevant documents present and verified  Required items: required blood products, implants, devices, and special equipment available  Patient identity confirmed: arm band, provided demographic data and hospital-assigned identification number  Time out: Immediately prior to procedure a "time out" was called to verify the correct patient, procedure, equipment, support staff and site/side marked as required. Preparation: Patient was prepped and draped in the usual sterile fashion. Indications: hemodynamic monitoring  Orientation:  Right  Location: radial artery  Sedation:  Patient sedated: General anesthesia.     Procedure Details:  Needle gauge: 20  Seldinger technique: Seldinger technique used (US guided)  Number of attempts: 1    Post-procedure:  Post-procedure: dressing applied  Waveform: good waveform and waveform confirmed  Patient tolerance: Patient tolerated the procedure well with no immediate complications

## 2023-11-01 NOTE — INTERVAL H&P NOTE
H&P reviewed. After examining the patient I find no changes in the patients condition since the H&P had been written.     Vitals:    11/01/23 0653   BP: 152/84   Pulse: 78   Resp: 16   Temp: 98.4 °F (36.9 °C)   SpO2: 98%

## 2023-11-01 NOTE — TELEPHONE ENCOUNTER
SEE 11/03/2023 TELEPHONE NOTE     11/01/2023-PT STILL IN HOSPITAL  11/16/2023-2 WK POCORINE PATH W/DKO

## 2023-11-01 NOTE — ANESTHESIA POSTPROCEDURE EVALUATION
Post-Op Assessment Note    CV Status:  Stable  Pain Score: 0    Pain management: adequate  Multimodal analgesia used between 6 hours prior to anesthesia start to PACU discharge    Mental Status:  Sleepy   Hydration Status:  Stable   PONV Controlled:  None   Airway Patency:  Patent  Airway: intubated   Two or more mitigation strategies used for obstructive sleep apnea   Post Op Vitals Reviewed: Yes      Staff: CRNA         No notable events documented.     /66 (11/01/23 1255)    Temp (!) 97 °F (36.1 °C) (11/01/23 1255)    Pulse 89 (11/01/23 1255)   Resp 12 (11/01/23 1255)    SpO2 100 % (11/01/23 1255)

## 2023-11-02 ENCOUNTER — APPOINTMENT (OUTPATIENT)
Dept: RADIOLOGY | Facility: HOSPITAL | Age: 68
DRG: 027 | End: 2023-11-02
Payer: COMMERCIAL

## 2023-11-02 LAB
ANION GAP SERPL CALCULATED.3IONS-SCNC: 8 MMOL/L
APTT PPP: 30 SECONDS (ref 23–37)
BUN SERPL-MCNC: 8 MG/DL (ref 5–25)
CALCIUM SERPL-MCNC: 8 MG/DL (ref 8.4–10.2)
CHLORIDE SERPL-SCNC: 106 MMOL/L (ref 96–108)
CO2 SERPL-SCNC: 24 MMOL/L (ref 21–32)
CREAT SERPL-MCNC: 0.62 MG/DL (ref 0.6–1.3)
ERYTHROCYTE [DISTWIDTH] IN BLOOD BY AUTOMATED COUNT: 12.3 % (ref 11.6–15.1)
GFR SERPL CREATININE-BSD FRML MDRD: 94 ML/MIN/1.73SQ M
GLUCOSE SERPL-MCNC: 135 MG/DL (ref 65–140)
HCT VFR BLD AUTO: 34.2 % (ref 34.8–46.1)
HGB BLD-MCNC: 12 G/DL (ref 11.5–15.4)
INR PPP: 1.32 (ref 0.84–1.19)
MCH RBC QN AUTO: 33 PG (ref 26.8–34.3)
MCHC RBC AUTO-ENTMCNC: 35.1 G/DL (ref 31.4–37.4)
MCV RBC AUTO: 94 FL (ref 82–98)
PLATELET # BLD AUTO: 184 THOUSANDS/UL (ref 149–390)
PMV BLD AUTO: 11.3 FL (ref 8.9–12.7)
POTASSIUM SERPL-SCNC: 3.9 MMOL/L (ref 3.5–5.3)
PROTHROMBIN TIME: 16.2 SECONDS (ref 11.6–14.5)
RBC # BLD AUTO: 3.64 MILLION/UL (ref 3.81–5.12)
SODIUM SERPL-SCNC: 138 MMOL/L (ref 135–147)
WBC # BLD AUTO: 10.2 THOUSAND/UL (ref 4.31–10.16)

## 2023-11-02 PROCEDURE — 85730 THROMBOPLASTIN TIME PARTIAL: CPT | Performed by: PHYSICIAN ASSISTANT

## 2023-11-02 PROCEDURE — 97163 PT EVAL HIGH COMPLEX 45 MIN: CPT

## 2023-11-02 PROCEDURE — A9585 GADOBUTROL INJECTION: HCPCS | Performed by: PHYSICIAN ASSISTANT

## 2023-11-02 PROCEDURE — 97166 OT EVAL MOD COMPLEX 45 MIN: CPT

## 2023-11-02 PROCEDURE — 85610 PROTHROMBIN TIME: CPT | Performed by: PHYSICIAN ASSISTANT

## 2023-11-02 PROCEDURE — 70553 MRI BRAIN STEM W/O & W/DYE: CPT

## 2023-11-02 PROCEDURE — 80048 BASIC METABOLIC PNL TOTAL CA: CPT | Performed by: PHYSICIAN ASSISTANT

## 2023-11-02 PROCEDURE — 99024 POSTOP FOLLOW-UP VISIT: CPT | Performed by: NEUROLOGICAL SURGERY

## 2023-11-02 PROCEDURE — 85027 COMPLETE CBC AUTOMATED: CPT | Performed by: PHYSICIAN ASSISTANT

## 2023-11-02 RX ORDER — GADOBUTROL 604.72 MG/ML
7 INJECTION INTRAVENOUS
Status: COMPLETED | OUTPATIENT
Start: 2023-11-02 | End: 2023-11-02

## 2023-11-02 RX ORDER — ONDANSETRON 2 MG/ML
4 INJECTION INTRAMUSCULAR; INTRAVENOUS EVERY 6 HOURS PRN
Status: DISCONTINUED | OUTPATIENT
Start: 2023-11-02 | End: 2023-11-03 | Stop reason: HOSPADM

## 2023-11-02 RX ORDER — METOCLOPRAMIDE HYDROCHLORIDE 5 MG/ML
10 INJECTION INTRAMUSCULAR; INTRAVENOUS EVERY 6 HOURS PRN
Status: DISCONTINUED | OUTPATIENT
Start: 2023-11-02 | End: 2023-11-03 | Stop reason: HOSPADM

## 2023-11-02 RX ORDER — LORAZEPAM 0.5 MG/1
0.5 TABLET ORAL ONCE
Status: COMPLETED | OUTPATIENT
Start: 2023-11-02 | End: 2023-11-02

## 2023-11-02 RX ORDER — HEPARIN SODIUM 5000 [USP'U]/ML
5000 INJECTION, SOLUTION INTRAVENOUS; SUBCUTANEOUS EVERY 8 HOURS SCHEDULED
Status: DISCONTINUED | OUTPATIENT
Start: 2023-11-02 | End: 2023-11-03 | Stop reason: HOSPADM

## 2023-11-02 RX ADMIN — FLUTICASONE PROPIONATE 1 SPRAY: 50 SPRAY, METERED NASAL at 21:11

## 2023-11-02 RX ADMIN — ACETAMINOPHEN 975 MG: 325 TABLET, FILM COATED ORAL at 05:31

## 2023-11-02 RX ADMIN — Medication 125 MG: at 15:24

## 2023-11-02 RX ADMIN — CEFAZOLIN SODIUM 2000 MG: 2 SOLUTION INTRAVENOUS at 00:11

## 2023-11-02 RX ADMIN — CEFAZOLIN SODIUM 2000 MG: 2 SOLUTION INTRAVENOUS at 10:15

## 2023-11-02 RX ADMIN — DEXAMETHASONE 4 MG: 4 TABLET ORAL at 05:31

## 2023-11-02 RX ADMIN — LEVETIRACETAM 500 MG: 500 TABLET, FILM COATED ORAL at 21:11

## 2023-11-02 RX ADMIN — HEPARIN SODIUM 5000 UNITS: 5000 INJECTION INTRAVENOUS; SUBCUTANEOUS at 21:11

## 2023-11-02 RX ADMIN — HEPARIN SODIUM 5000 UNITS: 5000 INJECTION INTRAVENOUS; SUBCUTANEOUS at 15:25

## 2023-11-02 RX ADMIN — ACETAMINOPHEN 975 MG: 325 TABLET, FILM COATED ORAL at 15:22

## 2023-11-02 RX ADMIN — GADOBUTROL 7 ML: 604.72 INJECTION INTRAVENOUS at 01:29

## 2023-11-02 RX ADMIN — ATORVASTATIN CALCIUM 10 MG: 10 TABLET, FILM COATED ORAL at 21:11

## 2023-11-02 RX ADMIN — ACETAMINOPHEN 975 MG: 325 TABLET, FILM COATED ORAL at 21:10

## 2023-11-02 RX ADMIN — DEXAMETHASONE 4 MG: 4 TABLET ORAL at 00:11

## 2023-11-02 RX ADMIN — LEVETIRACETAM 500 MG: 500 TABLET, FILM COATED ORAL at 10:11

## 2023-11-02 RX ADMIN — POLYETHYLENE GLYCOL 3350 17 G: 17 POWDER, FOR SOLUTION ORAL at 10:10

## 2023-11-02 RX ADMIN — DEXAMETHASONE 4 MG: 4 TABLET ORAL at 21:10

## 2023-11-02 RX ADMIN — AMLODIPINE BESYLATE 2.5 MG: 2.5 TABLET ORAL at 10:11

## 2023-11-02 RX ADMIN — LORAZEPAM 0.5 MG: 0.5 TABLET ORAL at 00:36

## 2023-11-02 RX ADMIN — DEXAMETHASONE 4 MG: 4 TABLET ORAL at 15:23

## 2023-11-02 NOTE — PLAN OF CARE
Problem: Prexisting or High Potential for Compromised Skin Integrity  Goal: Skin integrity is maintained or improved  Description: INTERVENTIONS:  - Identify patients at risk for skin breakdown  - Assess and monitor skin integrity  - Assess and monitor nutrition and hydration status  - Monitor labs   - Assess for incontinence   - Turn and reposition patient  - Assist with mobility/ambulation  - Relieve pressure over bony prominences  - Avoid friction and shearing  - Provide appropriate hygiene as needed including keeping skin clean and dry  - Evaluate need for skin moisturizer/barrier cream  - Collaborate with interdisciplinary team   - Patient/family teaching  - Consider wound care consult   Outcome: Progressing     Problem: MOBILITY - ADULT  Goal: Maintain or return to baseline ADL function  Description: INTERVENTIONS:  -  Assess patient's ability to carry out ADLs; assess patient's baseline for ADL function and identify physical deficits which impact ability to perform ADLs (bathing, care of mouth/teeth, toileting, grooming, dressing, etc.)  - Assess/evaluate cause of self-care deficits   - Assess range of motion  - Assess patient's mobility; develop plan if impaired  - Assess patient's need for assistive devices and provide as appropriate  - Encourage maximum independence but intervene and supervise when necessary  - Involve family in performance of ADLs  - Assess for home care needs following discharge   - Consider OT consult to assist with ADL evaluation and planning for discharge  - Provide patient education as appropriate  Outcome: Progressing  Goal: Maintains/Returns to pre admission functional level  Description: INTERVENTIONS:  - Perform BMAT or MOVE assessment daily.   - Set and communicate daily mobility goal to care team and patient/family/caregiver. - Collaborate with rehabilitation services on mobility goals if consulted  - Perform Range of Motion  times a day.   - Reposition patient every hours.  - Dangle patient  times a day  - Stand patient  times a day  - Ambulate patient  times a day  - Out of bed to chair  times a day   - Out of bed for meals  times a day  - Out of bed for toileting  - Record patient progress and toleration of activity level   Outcome: Progressing     Problem: PAIN - ADULT  Goal: Verbalizes/displays adequate comfort level or baseline comfort level  Description: Interventions:  - Encourage patient to monitor pain and request assistance  - Assess pain using appropriate pain scale  - Administer analgesics based on type and severity of pain and evaluate response  - Implement non-pharmacological measures as appropriate and evaluate response  - Consider cultural and social influences on pain and pain management  - Notify physician/advanced practitioner if interventions unsuccessful or patient reports new pain  Outcome: Progressing     Problem: INFECTION - ADULT  Goal: Absence or prevention of progression during hospitalization  Description: INTERVENTIONS:  - Assess and monitor for signs and symptoms of infection  - Monitor lab/diagnostic results  - Monitor all insertion sites, i.e. indwelling lines, tubes, and drains  - Monitor endotracheal if appropriate and nasal secretions for changes in amount and color  - [place] appropriate cooling/warming therapies per order  - Administer medications as ordered  - Instruct and encourage patient and family to use good hand hygiene technique  - Identify and instruct in appropriate isolation precautions for identified infection/condition  Outcome: Progressing     Problem: SAFETY ADULT  Goal: Maintain or return to baseline ADL function  Description: INTERVENTIONS:  -  Assess patient's ability to carry out ADLs; assess patient's baseline for ADL function and identify physical deficits which impact ability to perform ADLs (bathing, care of mouth/teeth, toileting, grooming, dressing, etc.)  - Assess/evaluate cause of self-care deficits   - Assess range of motion  - Assess patient's mobility; develop plan if impaired  - Assess patient's need for assistive devices and provide as appropriate  - Encourage maximum independence but intervene and supervise when necessary  - Involve family in performance of ADLs  - Assess for home care needs following discharge   - Consider OT consult to assist with ADL evaluation and planning for discharge  - Provide patient education as appropriate  Outcome: Progressing  Goal: Maintains/Returns to pre admission functional level  Description: INTERVENTIONS:  - Perform BMAT or MOVE assessment daily.   - Set and communicate daily mobility goal to care team and patient/family/caregiver. - Collaborate with rehabilitation services on mobility goals if consulted  - Perform Range of Motion  times a day. - Reposition patient every  hours.   - Dangle patient  times a day  - Stand patient  times a day  - Ambulate patient  times a day  - Out of bed to chair  times a day   - Out of bed for meals  times a day  - Out of bed for toileting  - Record patient progress and toleration of activity level   Outcome: Progressing  Goal: Patient will remain free of falls  Description: INTERVENTIONS:  - Educate patient/family on patient safety including physical limitations  - Instruct patient to call for assistance with activity   - Consult OT/PT to assist with strengthening/mobility   - Keep Call bell within reach  - Keep bed low and locked with side rails adjusted as appropriate  - Keep care items and personal belongings within reach  - Initiate and maintain comfort rounds  - Make Fall Risk Sign visible to staff  - Offer Toileting every  Hours, in advance of need  - Initiate/Maintain alarm  - Obtain necessary fall risk management equipment:  - Apply yellow socks and bracelet for high fall risk patients  - Consider moving patient to room near nurses station  Outcome: Progressing     Problem: DISCHARGE PLANNING  Goal: Discharge to home or other facility with appropriate resources  Description: INTERVENTIONS:  - Identify barriers to discharge w/patient and caregiver  - Arrange for needed discharge resources and transportation as appropriate  - Identify discharge learning needs (meds, wound care, etc.)  - Arrange for interpretive services to assist at discharge as needed  - Refer to Case Management Department for coordinating discharge planning if the patient needs post-hospital services based on physician/advanced practitioner order or complex needs related to functional status, cognitive ability, or social support system  Outcome: Progressing     Problem: Knowledge Deficit  Goal: Patient/family/caregiver demonstrates understanding of disease process, treatment plan, medications, and discharge instructions  Description: Complete learning assessment and assess knowledge base.   Interventions:  - Provide teaching at level of understanding  - Provide teaching via preferred learning methods  Outcome: Progressing

## 2023-11-02 NOTE — UTILIZATION REVIEW
Initial Clinical Review    Elective IP surgical procedure  Age/Sex: 79 y.o. female  Surgery Date: 11/1  Procedure: Left - Left frontal CRANIOTOMY IMAGE-GUIDED FOR TUMOR   Anesthesia: General  ASA Score: 2  Operative Findings:   Epithelial neoplasm likely meningioma  Some regions of brain invasion    POD#1 Progress Note: Reports doing well this morning. Notes some L frontal and temporal region dull pain that is controlled w/ Tylenol. On exam, fully oriented, no focal weakness Plan: seizure precautions, incentive spirometry, DW, I&O, q1h neuro checks, PT/OT evals; pain control, keppra, decadron. Admission Orders: Date/Time/Statement:   Admission Orders (From admission, onward)       Ordered        11/01/23 0755  Inpatient Admission  Once                          Orders Placed This Encounter   Procedures    Inpatient Admission     Standing Status:   Standing     Number of Occurrences:   1     Order Specific Question:   Level of Care     Answer:   Critical Care [15]     Order Specific Question:   Estimated length of stay     Answer:   Inpatient Only Surgery     Vital Signs:  Wt 84 kg (185 lb 3 oz)   BMI 31.79 kg/m²     Date/Time Temp Pulse Resp BP MAP (mmHg) SpO2 O2 Device   11/02/23 07:13:38 97.6 °F (36.4 °C) 66 14 110/63 79 93 % --   11/02/23 02:47:28 98.3 °F (36.8 °C) 74 17 122/77 92 94 % --   11/02/23 0000 -- -- -- -- -- -- None (Room air)   11/01/23 23:53:53 97.5 °F (36.4 °C) 70 18 123/70 88 97 % --   11/01/23 2100 97 °F (36.1 °C) Abnormal  -- -- -- -- -- --   11/01/23 2000 -- -- -- -- -- -- None (Room air)   11/01/23 18:42:21 96.4 °F (35.8 °C) Abnormal  77 -- 127/73 91 92 % --   11/01/23 15:41:46 97.1 °F (36.2 °C) Abnormal  75 18 122/76 91 94 % None (Room air)   11/01/23 1516 97.4 °F (36.3 °C) Abnormal  -- -- -- -- -- --   11/01/23 14:30:13 94.8 °F (34.9 °C) Abnormal  77 16 -- -- 95 % None (Room air)   11/01/23 1400 -- 74 16 127/71 88 96 % None (Room air)   11/01/23 1345 -- 76 14 116/64 86 96 % --   11/01/23 1330 -- 80 12 117/63 84 97 % --   11/01/23 1315 -- 88 14 125/69 89 96 % None (Room air)   11/01/23 1300 -- 90 17 115/67 80 100 % --   11/01/23 1255 97 °F (36.1 °C) Abnormal  89 12 132/66 90 100 % None (Room air)   11/01/23 0653 98.4 °F (36.9 °C) 78 16 152/84 -- 98 % None (Room air)       Pertinent Labs/Diagnostic Test Results:   MRI brain w wo contrast   Final Result by Keegan Emanuel MD (11/02 0234)      Status post left frontal craniotomy for resection of underlying dural based mass with expected postoperative changes as above. Small area of diffusion abnormality along the anterior medial margin of the resection cavity compatible with postoperative ischemic change. Workstation performed: [id number]           Results from last 7 days   Lab Units 11/01/23 0622   SARS-COV-2  Negative     Results from last 7 days   Lab Units 11/02/23  0606   WBC Thousand/uL 10.20*   HEMOGLOBIN g/dL 12.0   HEMATOCRIT % 34.2*   PLATELETS Thousands/uL 184         Results from last 7 days   Lab Units 11/02/23  0049   SODIUM mmol/L 138   POTASSIUM mmol/L 3.9   CHLORIDE mmol/L 106   CO2 mmol/L 24   ANION GAP mmol/L 8   BUN mg/dL 8   CREATININE mg/dL 0.62   EGFR ml/min/1.73sq m 94   CALCIUM mg/dL 8.0*         Results from last 7 days   Lab Units 11/01/23  1301   POC GLUCOSE mg/dl 113     Results from last 7 days   Lab Units 11/02/23  0049   GLUCOSE RANDOM mg/dL 135     Results from last 7 days   Lab Units 11/02/23  0606   PROTIME seconds 16.2*   INR  1.32*   PTT seconds 30     Results from last 7 days   Lab Units 11/01/23 0622   INFLUENZA A PCR  Negative   INFLUENZA B PCR  Negative   RSV PCR  Negative         Diet: Regular. Mobility: OOB TID. Q2h repositioning. PT/OT evals. DVT Prophylaxis: SCDs.      Medications/Pain Control:   Scheduled Medications:  acetaminophen, 975 mg, Oral, Q8H VICKI  amLODIPine, 2.5 mg, Oral, Daily  ascorbic acid, 125 mg, Oral, Daily  atorvastatin, 10 mg, Oral, Daily With Dinner  cefazolin, 2,000 mg, Intravenous, Q8H  dexamethasone, 4 mg, Oral, Q6H [address]   Followed by  Ariane Sudha ON 11/3/2023] dexamethasone, 4 mg, Oral, Q8H [address]   Followed by  Ariane Sudha ON 11/5/2023] dexamethasone, 2 mg, Oral, Q6H [address]   Followed by  Ariane Sudha ON 11/8/2023] dexamethasone, 2 mg, Oral, Q8H [address]   Followed by  Ariane Sudha ON 11/9/2023] dexamethasone, 2 mg, Oral, Q12H [address]   Followed by  Ariane Sudha ON 11/12/2023] dexamethasone, 2 mg, Oral, Q24H [address]  docusate sodium, 100 mg, Oral, BID  fluticasone, 1 spray, Nasal, Daily  levETIRAcetam, 500 mg, Oral, Q12H VICKI  polyethylene glycol, 17 g, Oral, Daily  senna, 1 tablet, Oral, Daily    Continuous IV Infusions:  sodium chloride 0.9 %, 75 mL/hr, Intravenous, Continuous    PRN Meds:  bisacodyl, 10 mg, Rectal, Daily PRN  HYDROmorphone, 0.2 mg, Intravenous, Q4H PRN  metoclopramide, 10 mg, Intravenous, Q6H PRN; 11/1 x1  ondansetron, 4 mg, Intravenous, Q6H PRN; 11/1 x1  oxyCODONE, 5 mg, Oral, Q4H PRN  oxyCODONE, 2.5 mg, Oral, Q4H PRN    LORazepam (ATIVAN) tablet 0.5 mg  Dose: 0.5 mg Freq: Once Route: PO  Indications Comment: prior to MRI  Start: 11/02/23 0045 End: 11/02/23 0036   vancomycin (VANCOCIN) IVPB (premix in dextrose) 1,000 mg 200 mL  Dose: 1,000 mg Freq: Once Route: IV  Start: 11/01/23 0630 End: 11/01/23 0915       Network Utilization Review Department  ATTENTION: Please call with any questions or concerns to [phone] and carefully listen to the prompts so that you are directed to the right person. All voicemails are confidential.   For Discharge needs, contact Care Management DC Support Team at [phone] opt. 2  Send all requests for admission clinical reviews, approved or denied determinations and any other requests to dedicated fax number below belonging to the campus where the patient is receiving treatment.  List of dedicated fax numbers for the Facilities:  [place] DENIALS (Administrative/Medical Necessity) [phone]   DISCHARGE SUPPORT TEAM (Gigi Hamm) [phone] [facility] ([facility] 367 [facility] 1000 [facility] [phone] [address] [facility] [address] [address] [facility] [address] [phone]

## 2023-11-02 NOTE — PLAN OF CARE
Problem: Prexisting or High Potential for Compromised Skin Integrity  Goal: Skin integrity is maintained or improved  Description: INTERVENTIONS:  - Identify patients at risk for skin breakdown  - Assess and monitor skin integrity  - Assess and monitor nutrition and hydration status  - Monitor labs   - Assess for incontinence   - Turn and reposition patient  - Assist with mobility/ambulation  - Relieve pressure over bony prominences  - Avoid friction and shearing  - Provide appropriate hygiene as needed including keeping skin clean and dry  - Evaluate need for skin moisturizer/barrier cream  - Collaborate with interdisciplinary team   - Patient/family teaching  - Consider wound care consult   Outcome: Progressing     Problem: MOBILITY - ADULT  Goal: Maintain or return to baseline ADL function  Description: INTERVENTIONS:  -  Assess patient's ability to carry out ADLs; assess patient's baseline for ADL function and identify physical deficits which impact ability to perform ADLs (bathing, care of mouth/teeth, toileting, grooming, dressing, etc.)  - Assess/evaluate cause of self-care deficits   - Assess range of motion  - Assess patient's mobility; develop plan if impaired  - Assess patient's need for assistive devices and provide as appropriate  - Encourage maximum independence but intervene and supervise when necessary  - Involve family in performance of ADLs  - Assess for home care needs following discharge   - Consider OT consult to assist with ADL evaluation and planning for discharge  - Provide patient education as appropriate  Outcome: Progressing  Goal: Maintains/Returns to pre admission functional level  Description: INTERVENTIONS:  - Perform BMAT or MOVE assessment daily.   - Set and communicate daily mobility goal to care team and patient/family/caregiver. - Collaborate with rehabilitation services on mobility goals if consulted  - Perform Range of Motion  times a day.   - Reposition patient every hours.  - Dangle patient  times a day  - Stand patient  times a day  - Ambulate patient  times a day  - Out of bed to chair  times a day   - Out of bed for meals  times a day  - Out of bed for toileting  - Record patient progress and toleration of activity level   Outcome: Progressing     Problem: PAIN - ADULT  Goal: Verbalizes/displays adequate comfort level or baseline comfort level  Description: Interventions:  - Encourage patient to monitor pain and request assistance  - Assess pain using appropriate pain scale  - Administer analgesics based on type and severity of pain and evaluate response  - Implement non-pharmacological measures as appropriate and evaluate response  - Consider cultural and social influences on pain and pain management  - Notify physician/advanced practitioner if interventions unsuccessful or patient reports new pain  Outcome: Progressing     Problem: INFECTION - ADULT  Goal: Absence or prevention of progression during hospitalization  Description: INTERVENTIONS:  - Assess and monitor for signs and symptoms of infection  - Monitor lab/diagnostic results  - Monitor all insertion sites, i.e. indwelling lines, tubes, and drains  - Monitor endotracheal if appropriate and nasal secretions for changes in amount and color  - [place] appropriate cooling/warming therapies per order  - Administer medications as ordered  - Instruct and encourage patient and family to use good hand hygiene technique  - Identify and instruct in appropriate isolation precautions for identified infection/condition  Outcome: Progressing     Problem: SAFETY ADULT  Goal: Maintain or return to baseline ADL function  Description: INTERVENTIONS:  -  Assess patient's ability to carry out ADLs; assess patient's baseline for ADL function and identify physical deficits which impact ability to perform ADLs (bathing, care of mouth/teeth, toileting, grooming, dressing, etc.)  - Assess/evaluate cause of self-care deficits   - Assess range of motion  - Assess patient's mobility; develop plan if impaired  - Assess patient's need for assistive devices and provide as appropriate  - Encourage maximum independence but intervene and supervise when necessary  - Involve family in performance of ADLs  - Assess for home care needs following discharge   - Consider OT consult to assist with ADL evaluation and planning for discharge  - Provide patient education as appropriate  Outcome: Progressing  Goal: Maintains/Returns to pre admission functional level  Description: INTERVENTIONS:  - Perform BMAT or MOVE assessment daily.   - Set and communicate daily mobility goal to care team and patient/family/caregiver. - Collaborate with rehabilitation services on mobility goals if consulted  - Perform Range of Motion  times a day. - Reposition patient every  hours.   - Dangle patient times a day  - Stand patient  times a day  - Ambulate patient  times a day  - Out of bed to chair  times a day   - Out of bed for meals  times a day  - Out of bed for toileting  - Record patient progress and toleration of activity level   Outcome: Progressing  Goal: Patient will remain free of falls  Description: INTERVENTIONS:  - Educate patient/family on patient safety including physical limitations  - Instruct patient to call for assistance with activity   - Consult OT/PT to assist with strengthening/mobility   - Keep Call bell within reach  - Keep bed low and locked with side rails adjusted as appropriate  - Keep care items and personal belongings within reach  - Initiate and maintain comfort rounds  - Make Fall Risk Sign visible to staff  - Offer Toileting every  Hours, in advance of need  - Initiate/Maintain alarm  - Obtain necessary fall risk management equipment:   - Apply yellow socks and bracelet for high fall risk patients  - Consider moving patient to room near nurses station  Outcome: Progressing     Problem: DISCHARGE PLANNING  Goal: Discharge to home or other facility with appropriate resources  Description: INTERVENTIONS:  - Identify barriers to discharge w/patient and caregiver  - Arrange for needed discharge resources and transportation as appropriate  - Identify discharge learning needs (meds, wound care, etc.)  - Arrange for interpretive services to assist at discharge as needed  - Refer to Case Management Department for coordinating discharge planning if the patient needs post-hospital services based on physician/advanced practitioner order or complex needs related to functional status, cognitive ability, or social support system  Outcome: Progressing     Problem: Knowledge Deficit  Goal: Patient/family/caregiver demonstrates understanding of disease process, treatment plan, medications, and discharge instructions  Description: Complete learning assessment and assess knowledge base.   Interventions:  - Provide teaching at level of understanding  - Provide teaching via preferred learning methods  Outcome: Progressing

## 2023-11-02 NOTE — ASSESSMENT & PLAN NOTE
1 Day Post-Op Procedure(s):  Left frontal CRANIOTOMY IMAGE-GUIDED FOR TUMOR (Dr. Michael Agustin, 11/1/2023)  Patient diagnosed with 3 separate meningiomas, left frontal region which were being monitored with surveillance imaging. Given increase in one of them in size recommendation was made for surgical resection. Imaging:   Postoperative MRI brain with without 11/2/2023: Status post left frontal craniotomy for section of underlying dural based mass with expected postoperative changes as above. Small area of diffusion abnormality along the anterior medial margin of the resection cavity compatible with postoperative ischemic changes. Plan:   Continue monitor neurological exam.  Remains nonfocal without complaints at this time. STAT CT head with any neurological decline including a drop GCS of 2 points within 1 hour. MRI imaging results reviewed with patient. Operative findings include epithelial neoplasm likely meningioma discussed with patient. Discussed with patient, additional possible radiation versus surveillance imaging will be based upon grading/final pathology. Pain control as needed - utilizing Tylenol only. Will DC IV medications. Keppra for sz ppx x 1 wk  Continue Decadron 4Q6. Taper as ordered. Mobilize with physical and Occupational Therapy - evaluation pending. DVT prophylaxis: Bilateral SCDs. We will start SQ heparin today    Nursing rounds completed with [facility]. Neurosurgery ill continue to follow as primary. Anticipate d/c in 24H pending progress. Call with questions/concerns.

## 2023-11-02 NOTE — PHYSICAL THERAPY NOTE
PHYSICAL THERAPY EVALUATION  NAME:  Rosa Mcnair  DATE: 11/02/23    AGE:   79 y.o. Mrn:   [id number]  ADMIT DX:  Meningioma ([address]) [D32.9]    Past Medical History:   Diagnosis Date    Anxiety     Cellulitis     Chronic rhinitis     Last Assessed:6/6/2016    Colon polyp     GERD without esophagitis     Last Assessed:6/6/2016    Hyperlipidemia     Hypometabolism     Vitamin D deficiency        Past Surgical History:   Procedure Laterality Date    COLONOSCOPY      TONSILLECTOMY         Length Of Stay: 1    PHYSICAL THERAPY EVALUATION:        11/02/23 0930   Note Type   Note type Evaluation   Pain Assessment   Pain Assessment Tool 0-10   Pain Score 2   Pain Location/Orientation Location: Head   Pain Onset/Description Onset: Ongoing;Frequency: Constant/Continuous; Descriptor: Aching   Patient's Stated Pain Goal No pain   Hospital Pain Intervention(s) Ambulation/increased activity;Repositioned   Restrictions/Precautions   Weight Bearing Precautions Per Order No   Other Precautions Pain;Multiple lines   Home Living   Type of [address] [place] [address] Two level;Bed/bath upstairs;Stairs to enter with rails  (4 CHICHI)   Home Equipment   (none as per pt)   Additional Comments Pt reports living with spouse who is able to assist as needed   Prior Function   Level of [place] Independent with functional mobility   Lives With Spouse   Receives Help From [facility] in the last 6 months 0   Comments Pt denies the use of an AD for ambulation PTA   General   Family/Caregiver Present Yes   Cognition   Overall Cognitive Status WFL   Arousal/Participation Alert   Orientation Level Oriented X4   Memory Within functional limits   Following Commands Follows all commands and directions without difficulty   RUE Assessment   RUE Assessment WFL   LUE Assessment   LUE Assessment WFL   RLE Assessment   RLE Assessment WFL   LLE Assessment   LLE Assessment WFL   Bed Mobility   Supine to Sit 5  Supervision   Additional items Increased time required   Transfers   Sit to Stand 5  Supervision   Additional items Increased time required   Stand to Sit 5  Supervision   Additional items Increased time required   Ambulation/Elevation   Gait pattern Short stride; Foward flexed   Gait Assistance 5  Supervision   Assistive Device Rolling walker;None   Distance 80 ft with RW, 40 ft without AD   Stair Management Assistance 5  Supervision   Stair Management Technique Two rails   Number of Stairs 3   Balance   Static Sitting Fair +   Static Standing Fair   Ambulatory Fair -   Activity Tolerance   Activity Tolerance Patient tolerated treatment well   Medical Staff Made Aware Alexis Sahu OT; OT present for co evaluation due to pts current medical presentation   Nurse Made Aware Pt appropriate to be seen and mobilize per nsg   Assessment   Prognosis Good   Problem List Pain;Decreased mobility   Assessment Pt is 79 y.o. female seen for PT evaluation s/p admit to [facility] on 11/1/2023. Two pt identifiers were used to confirm. Pt presented for scheduled Left frontal CRANIOTOMY IMAGE-GUIDED FOR TUMOR which was performed on 11/1/23. Pt was admitted with a primary dx of: Meningioma s/p Left frontal CRANIOTOMY IMAGE-GUIDED FOR TUMOR . PT now consulted for assessment of mobility and d/c needs. Pt with Out of bed orders. Pts current co morbidities affecting treatment include:  has a past medical history of Anxiety, Cellulitis, Chronic rhinitis, Colon polyp, GERD without esophagitis, Hyperlipidemia, Hypometabolism, and Vitamin D deficiency. . Pts current clinical presentation is Unstable/ Unpredictable (high complexity) due to Ongoing medical management for primary dx, Decreased activity tolerance compared to baseline, Continuous pulse oximetry monitoring , s/p surgical intervention  . Upon evaluation, pt currently is requiring Supervision for bed mobility; Supervision for transfers and Supervision for ambulation w/ RW and no AD.  Pt presents at PT eval functioning below baseline and currently w/ overall mobility deficits 2* to: pain, decreased activity tolerance compared to baseline. At conclusion of PT session pt returned back in chair with phone and call bell within reach. Pt denies any further questions at this time. PT is currently recommending Home with family support. D/C acute care PT at this time due to pt being supervision with all mobility and having supportive family who are able to assist as needed. Pt denies any mobility or safety concerns about returning home at d/c. Recommend pt continues to mobilize with nsg and restorative techs during hospital stay. Barriers to Discharge None   Goals   Patient Goals " to go home"   Plan   PT Frequency   (DC IPPT)   Discharge Recommendation   Rehab Resource Intensity Level, PT No post-acute rehabilitation needs   AM-PAC Basic Mobility Inpatient   Turning in Flat Bed Without Bedrails 4   Lying on Back to Sitting on Edge of Flat Bed Without Bedrails 4   Moving Bed to Chair 4   Standing Up From Chair Using Arms 4   Walk in Room 3   Climb 3-5 Stairs With Railing 3   Basic Mobility Inpatient Raw Score 22   Basic Mobility Standardized Score 47.4   Highest Level Of Mobility   JH-HLM Goal 7: Walk 25 feet or more   JH-HLM Achieved 7: Walk 25 feet or more   Modified [place] Scale   Modified Sudha Scale 2   Barthel Index   Feeding 10   Bathing 5   Grooming Score 5   Dressing Score 10   Bladder Score 10   Bowels Score 10   Toilet Use Score 10   Transfers (Bed/Chair) Score 15   Mobility (Level Surface) Score 10   Stairs Score 5   Barthel Index Score 90   Portions of the documentation may have been created using voice recognition software. Occasional wrong word or sound alike substitutions may have occurred due to the inherent limitations of the voice recognition software. Read the chart carefully and recognize, using context, where substitutions have occurred.     Marianne Glez, PT, DPT

## 2023-11-02 NOTE — OCCUPATIONAL THERAPY NOTE
Occupational Therapy Evaluation     Patient Name: Leonor ROBERSON Date: 11/2/2023  Problem List  Principal Problem:    Meningioma [facility])    Past Medical History  Past Medical History:   Diagnosis Date    Anxiety     Cellulitis     Chronic rhinitis     Last Assessed:6/6/2016    Colon polyp     GERD without esophagitis     Last Assessed:6/6/2016    Hyperlipidemia     Hypometabolism     Vitamin D deficiency      Past Surgical History  Past Surgical History:   Procedure Laterality Date    COLONOSCOPY      [place] CRNEC TREPHINE BONE FLAP MENINGIOMA SUPRATENTOR Left 11/1/2023    Procedure: Left frontal CRANIOTOMY IMAGE-GUIDED FOR TUMOR;  Surgeon: Gentry Don MD;  Location: [facility];  Service: Neurosurgery    TONSILLECTOMY           11/02/23 0931   OT Last Visit   OT Visit Date 11/02/23   Note Type   Note type Evaluation   Pain Assessment   Pain Assessment Tool 0-10   Pain Score 2   Pain Location/Orientation Location: Head   Hospital Pain Intervention(s) Repositioned; Ambulation/increased activity; Emotional support;Relaxation technique   Restrictions/Precautions   Weight Bearing Precautions Per Order No   Home Living   Type of [address] [place] [address] Two level;Bed/bath upstairs;Stairs to enter with rails  (4 CHICHI)   Prior Function   Level of [place] Independent with ADLs; Independent with functional mobility; Independent with IADLS   Lives With Spouse   Receives Help From Family   IADLs Independent with driving; Independent with meal prep; Independent with medication management   Falls in the last 6 months 0   Vocational Full time employment   Lifestyle   Autonomy I adls and mobility - i iadls - shares homemaking with spouse   Reciprocal Relationships supportive family and friends   Service to Others works [place] active pta   Subjective   Subjective offers no c/o   ADL   Eating Assistance 7  Independent   Grooming Assistance 5  [id number] [id number] Supervision/Setup   LB Bathing Assistance 5  Supervision/Setup   UB Dressing Assistance 5  Supervision/Setup   LB Dressing Assistance 5  Supervision/Setup   Toileting Assistance  5  Supervision/Setup   Bed Mobility   Supine to Sit 5  Supervision   Transfers   Sit to Stand 5  Supervision   Stand to Sit 5  Supervision   Functional Mobility   Functional Mobility 5  Supervision   Balance   Static Sitting Fair +   Dynamic Sitting Fair +   Static Standing Fair   Dynamic Standing Fair   Ambulatory Fair   Activity Tolerance   Activity Tolerance Patient tolerated treatment well   Medical Staff Made Aware Amy Tillman DPT and Richy Mendoza SPT present for coeval 2* medical complexity, comorbidities and limited overall tolerance to activity   RUE Assessment   RUE Assessment WFL   LUE Assessment   LUE Assessment WFL   Cognition   Overall Cognitive Status WFL   Assessment   Limitation Decreased endurance;Decreased self-care trans;Decreased high-level ADLs   Prognosis Good   Assessment Pt is a 79 y.o. female who was [facility] to [facility] on [facility] with Meningioma [facility]) s/p crani for resection. Patient  has a past medical history of Anxiety, Cellulitis, Chronic rhinitis, Colon polyp, GERD without esophagitis, Hyperlipidemia, Hypometabolism, and Vitamin D deficiency. At baseline pt was completing adls and mobility independently- I iadls - shares homemaking with spouse. Pt lives with spouse in 2 story home with 4 CHICHI. Currently pt requires sba for overall ADLS and sba for functional mobility/transfers. Pt currently presents with impairments in the following categories -difficulty performing IADLS  standing balance/tolerance.  These impairments, as well as pt's fatigue and risk for falls  limit pt's ability to safely engage in all baseline areas of occupation, includingcommunity mobility, laundry , driving, house maintenance, meal prep, cleaning, work/volunteer work , social participation , and leisure activities however has supportive family and friends who are able to provide assist prn -  From OT standpoint, recommend home with family support upon D/C. No acute OT needs indicated at this time- d/c from caseload   Goals   Patient Goals go home   Plan   OT Frequency Eval only   Discharge Recommendation   Rehab Resource Intensity Level, OT No post-acute rehabilitation needs   AM-PAC Daily Activity Inpatient   Lower Body Dressing 4   Bathing 4   Toileting 4   Upper Body Dressing 4   Grooming 4   Eating 4   Daily Activity Raw Score 24   Daily Activity Standardized Score (Calc for Raw Score >=11) 57.54   AM-PAC Applied Cognition Inpatient   Following a Speech/Presentation 4   Understanding Ordinary Conversation 4   Taking Medications 4   Remembering Where Things Are Placed or Put Away 4   Remembering List of 4-5 Errands 4   Taking Care of Complicated Tasks 4   Applied Cognition Raw Score 24   Applied Cognition Standardized Score 62.21   End of Consult   Education Provided Yes;Family or social support of family present for education by provider   Patient Position at End of Consult Bedside chair; All needs within reach   Nurse Communication Nurse aware of consult       The patient's raw score on the AM-PAC Daily Activity Inpatient Short Form is 24. A raw score of greater than or equal to 19 suggests the patient may benefit from discharge to home. Please refer to the recommendation of the Occupational Therapist for safe discharge planning.       [facility]

## 2023-11-02 NOTE — PROGRESS NOTES
[facility]  Progress Note  Name: Naveen Wylie  MRN: [id number]  [facility] I Date of Admission: 11/1/2023   Date of Service: 11/2/2023 I Hospital Day: 1    Assessment/Plan   * Meningioma [facility])  Assessment & Plan  1 Day Post-Op Procedure(s):  Left frontal CRANIOTOMY IMAGE-GUIDED FOR TUMOR (Dr. Sandra Adler, 11/1/2023)  Patient diagnosed with 3 separate meningiomas, left frontal region which were being monitored with surveillance imaging. Given increase in one of them in size recommendation was made for surgical resection. Imaging:   Postoperative MRI brain with without 11/2/2023: Status post left frontal craniotomy for section of underlying dural based mass with expected postoperative changes as above. Small area of diffusion abnormality along the anterior medial margin of the resection cavity compatible with postoperative ischemic changes. Plan:   Continue monitor neurological exam.  Remains nonfocal without complaints at this time. STAT CT head with any neurological decline including a drop GCS of 2 points within 1 hour. MRI imaging results reviewed with patient. Operative findings include epithelial neoplasm likely meningioma discussed with patient. Discussed with patient, additional possible radiation versus surveillance imaging will be based upon grading/final pathology. Pain control as needed - utilizing Tylenol only. Will DC IV medications. Keppra for sz ppx x 1 wk  Continue Decadron 4Q6. Taper as ordered. Mobilize with physical and Occupational Therapy - evaluation pending. DVT prophylaxis: Bilateral SCDs. We will start SQ heparin today    Nursing rounds completed with [facility]. Neurosurgery ill continue to follow as primary. Anticipate d/c in 24H pending progress. Call with questions/concerns.               Subjective/Objective   Chief Complaint: I'm doing well    Subjective: She is overall doing well this AM.  She did admit to left frontal and temporal region dull pain yesterday but controlled with Tylenol and nearly resolved this AM.  Denies any headache, vision or speech changes. Denies any weakness or numbness. Denies any chest pain or shortness of breath. Denies any nausea vomiting. Tolerating p.o. Voiding. Objective: Sitting up in bed. Eating breakfast.    I/O         10/31 0701 11/01 0700 11/01 0701 11/02 0700 11/02 0701 11/03 0700    P. O.  960     I.V. (mL/kg)  2000 (23.8)     Total Intake(mL/kg)  2960 (35.2)     Urine (mL/kg/hr)  1725 (0.9) 301 (1.3)    Emesis/NG output  0     Other  0     Blood  20     Total Output  1745 301    Net  +1215 -301                   Invasive Devices       Peripheral Intravenous Line  Duration             Peripheral IV 11/01/23 Dorsal (posterior); Left Hand <1 day    Peripheral IV 11/01/23 Dorsal (posterior); Right Hand <1 day                    Physical Exam:  Vitals: Blood pressure 110/63, pulse 66, temperature 97.6 °F (36.4 °C), resp. rate 14, weight 84 kg (185 lb 3 oz), SpO2 93 %, not currently breastfeeding. ,Body mass index is 31.79 kg/m².     General appearance: alert, appears stated age, cooperative and no distress  Head: Normocephalic, without obvious abnormality, incision CDI  Eyes: EOMI, conjugate gaze  Neck: supple, symmetrical, trachea midline   Lungs: non labored breathing  Heart: regular heart rate  Neurologic:   Mental status: Alert, oriented, thought content appropriate  Cranial nerves: grossly intact (Cranial nerves II-XII)  Sensory: normal to LT x4  Motor: moving all extremities without focal weakness  Coordination: finger to nose normal bilaterally, no drift bilaterally    Lab Results:  Results from last 7 days   Lab Units 11/02/23  0606   WBC Thousand/uL 10.20*   HEMOGLOBIN g/dL 12.0   HEMATOCRIT % 34.2*   PLATELETS Thousands/uL 184     Results from last 7 days   Lab Units 11/02/23  0049   SODIUM mmol/L 138   POTASSIUM mmol/L 3.9   CHLORIDE mmol/L 106   CO2 mmol/L 24   BUN mg/dL 8   CREATININE mg/dL 0.62   CALCIUM mg/dL 8.0*             Results from last 7 days   Lab Units 11/02/23  0606   INR  1.32*   PTT seconds 30     No results found for: "TROPONINT"  ABG:No results found for: "PHART", "EIG5EGM", "PO2ART", "TKX8CAW", "H3MUWCGZ", "BEART", "SOURCE"    Imaging Studies: I have personally reviewed pertinent reports. and I have personally reviewed pertinent films in PACS    MRI brain w wo contrast    Result Date: 11/2/2023  Impression: Status post left frontal craniotomy for resection of underlying dural based mass with expected postoperative changes as above. Small area of diffusion abnormality along the anterior medial margin of the resection cavity compatible with postoperative ischemic change.  Workstation performed: [id number]       VTE Pharmacologic Prophylaxis: Heparin    VTE Mechanical Prophylaxis: sequential compression device

## 2023-11-03 ENCOUNTER — TRANSITIONAL CARE MANAGEMENT (OUTPATIENT)
Dept: FAMILY MEDICINE CLINIC | Facility: CLINIC | Age: 68
End: 2023-11-03

## 2023-11-03 ENCOUNTER — TELEPHONE (OUTPATIENT)
Dept: NEUROSURGERY | Facility: CLINIC | Age: 68
End: 2023-11-03

## 2023-11-03 VITALS
WEIGHT: 180.78 LBS | DIASTOLIC BLOOD PRESSURE: 75 MMHG | BODY MASS INDEX: 31.03 KG/M2 | RESPIRATION RATE: 18 BRPM | TEMPERATURE: 97 F | SYSTOLIC BLOOD PRESSURE: 144 MMHG | OXYGEN SATURATION: 92 % | HEART RATE: 56 BPM

## 2023-11-03 PROCEDURE — 99024 POSTOP FOLLOW-UP VISIT: CPT | Performed by: NEUROLOGICAL SURGERY

## 2023-11-03 RX ORDER — ACETAMINOPHEN 325 MG/1
975 TABLET ORAL EVERY 8 HOURS PRN
Refills: 0
Start: 2023-11-03

## 2023-11-03 RX ORDER — DEXAMETHASONE 2 MG/1
TABLET ORAL
Qty: 40 TABLET | Refills: 0 | Status: SHIPPED | OUTPATIENT
Start: 2023-11-03

## 2023-11-03 RX ORDER — DOCUSATE SODIUM 100 MG/1
100 CAPSULE, LIQUID FILLED ORAL 2 TIMES DAILY PRN
Refills: 0
Start: 2023-11-03

## 2023-11-03 RX ORDER — POLYETHYLENE GLYCOL 3350 17 G/17G
17 POWDER, FOR SOLUTION ORAL DAILY PRN
Refills: 0
Start: 2023-11-03

## 2023-11-03 RX ORDER — LEVETIRACETAM 500 MG/1
500 TABLET ORAL EVERY 12 HOURS SCHEDULED
Qty: 10 TABLET | Refills: 0 | Status: SHIPPED | OUTPATIENT
Start: 2023-11-03 | End: 2023-11-08

## 2023-11-03 RX ADMIN — DEXAMETHASONE 4 MG: 4 TABLET ORAL at 11:50

## 2023-11-03 RX ADMIN — POLYETHYLENE GLYCOL 3350 17 G: 17 POWDER, FOR SOLUTION ORAL at 08:57

## 2023-11-03 RX ADMIN — HEPARIN SODIUM 5000 UNITS: 5000 INJECTION INTRAVENOUS; SUBCUTANEOUS at 05:47

## 2023-11-03 RX ADMIN — SENNOSIDES 8.6 MG: 8.6 TABLET, FILM COATED ORAL at 08:56

## 2023-11-03 RX ADMIN — AMLODIPINE BESYLATE 2.5 MG: 2.5 TABLET ORAL at 08:56

## 2023-11-03 RX ADMIN — FLUTICASONE PROPIONATE 1 SPRAY: 50 SPRAY, METERED NASAL at 08:57

## 2023-11-03 RX ADMIN — Medication 125 MG: at 08:54

## 2023-11-03 RX ADMIN — DEXAMETHASONE 4 MG: 4 TABLET ORAL at 05:47

## 2023-11-03 RX ADMIN — LEVETIRACETAM 500 MG: 500 TABLET, FILM COATED ORAL at 08:56

## 2023-11-03 RX ADMIN — DOCUSATE SODIUM 100 MG: 100 CAPSULE, LIQUID FILLED ORAL at 08:56

## 2023-11-03 RX ADMIN — ACETAMINOPHEN 975 MG: 325 TABLET, FILM COATED ORAL at 05:47

## 2023-11-03 NOTE — ASSESSMENT & PLAN NOTE
2 Days Post-Op Procedure(s):  Left frontal CRANIOTOMY IMAGE-GUIDED FOR TUMOR (Dr. Cherene Libman, 11/1/2023)  Patient diagnosed with 3 separate meningiomas, left frontal region which were being monitored with surveillance imaging. Given increase in one of them in size recommendation was made for surgical resection. Imaging:   Postoperative MRI brain with without 11/2/2023: Status post left frontal craniotomy for section of underlying dural based mass with expected postoperative changes as above. Small area of diffusion abnormality along the anterior medial margin of the resection cavity compatible with postoperative ischemic changes. Plan:   Continue monitor neurological exam.  Remains nonfocal without complaints at this time. STAT CT head with any neurological decline including a drop GCS of 2 points within 1 hour. Operative findings include epithelial neoplasm likely meningioma. Again discussed with patient, additional possible radiation versus surveillance imaging will be based upon grading/final pathology. Pain control as needed - utilizing Tylenol only. Keppra for sz ppx x 1 wk  Continue Decadron 4Q6. Taper as ordered. Mobilize with physical and Occupational Therapy - cleared for home  DVT prophylaxis: Bilateral SCDs. [facility]    Nursing rounds completed with Saulo Govea. Patient cleared for dc home today with outpatient follow-up as scheduled. Discharge instructions reviewed with patient and her . All questions answered. Call with questions/concerns.

## 2023-11-03 NOTE — DISCHARGE INSTR - AVS FIRST PAGE
Decadron (steroid) taper  11/3/2023:  Two tablets by mouth 4 times a day   11/4-11/5: Two tablets by mouth 3 times a day   11/6-11/7: One tablet by mouth 4 times a day  11/8-11/9: One tablet by mouth 3 times a day  11/10-11/11: One tablet by mouth twice a day  11/12-11/13: One tablet by mouth daily    Discharge Instructions      Surgical incision care:  Keep dressings in place for 2 days. After 2 days, incisions may be left open to air, but should remain clean. THREE days after surgery you START showering using a baby shampoo including head incision. Rinse off shampoo and pat dry. Wash hair AT LEAST every other day  Continue to use clean towel and washcloth for 2 weeks post-op. Avoid rubbing the incision but gently massage hair. Do NOT immerse the incisions in water for 6 weeks. Staples/suture will be removed at your 2 week postoperative visit. Do not apply any creams or ointments to the incision, unless otherwise instructed by [facility]. Contact office if increasing redness, drainage, pain or swelling occurs around the incisions or if you develop a fever greater than 101F  Do not dye/perm hair or use any hair products until cleared by Neurosurgery. Activity:  Do not lift, push or pull more than 10 pounds for 2 weeks. Avoid bending, lifting and twisting for 2 weeks. No running. No athletic activities until cleared. No driving for at least 2 weeks or until cleared by Neurosurgery. Do not use a hair dryer, and NO hair products such as mousse, oils, and gels. Do not brush your hair away from the incision since this will put strain on the suture line. Do NOT dye or perm hair for 6 weeks or until cleared by physician. Continue to change bed linens and pajamas more frequently. Wear clean clothes daily. May walk as tolerated. Recommend 4 short walks daily.          Postoperative medication:  [facility] will provide pain medication in the postoperative period. All prescriptions must come from a single practice. Take medications as prescribed. Call office with any questions/concerns. May use over the counter Tylenol. No NSAIDs (ie. Ibuprofen, Aleve, Advil, Naproxen)  Please contact office for questions regarding dosage and modifications. No antiplatelet or anticoagulation medication (ie. Coumadin, Aspirin, Plavix) until cleared by [address], unless otherwise instructed. Please contact [facility] if you have any questions about the effects of any of your medications on blood clotting. Do not operate heavy machinery or vehicles while taking sedating medications. Use a bowel regimen while on opioids as they induce constipation. Ie. Senokot-S, Miralax, Colace, etc. Increase fiber and water intake. Follow-up as scheduled for a 2 week post-operative visit for an incision check and final pathology. ** Please notify the office if incision becomes red, swollen, tender, or has increased drainage, and temp>101. Return to the ER if you experience increased headache, drowsiness, weakness, nausea/vomiting, or seizures. **

## 2023-11-03 NOTE — DISCHARGE SUMMARY
[address] [facility]  Discharge- [facility] 1955, 79 y.o. female MRN: [id number]  Unit/Bed#: [facility] 632-01 Encounter: [id number]  Primary Care Provider: Roberta Mckinney MD   Date and time admitted to hospital: 11/1/2023  6:11 AM    * Meningioma [facility])  Assessment & Plan  2 Days Post-Op Procedure(s):  Left frontal CRANIOTOMY IMAGE-GUIDED FOR TUMOR (Dr. Aida Jay, 11/1/2023)  Patient diagnosed with 3 separate meningiomas, left frontal region which were being monitored with surveillance imaging. Given increase in one of them in size recommendation was made for surgical resection. Imaging:   Postoperative MRI brain with without 11/2/2023: Status post left frontal craniotomy for section of underlying dural based mass with expected postoperative changes as above. Small area of diffusion abnormality along the anterior medial margin of the resection cavity compatible with postoperative ischemic changes. Plan:   Continue monitor neurological exam.  Remains nonfocal without complaints at this time. STAT CT head with any neurological decline including a drop GCS of 2 points within 1 hour. Operative findings include epithelial neoplasm likely meningioma. Again discussed with patient, additional possible radiation versus surveillance imaging will be based upon grading/final pathology. Pain control as needed - utilizing Tylenol only. Keppra for sz ppx x 1 wk  Continue Decadron 4Q6. Taper as ordered. Mobilize with physical and Occupational Therapy - cleared for home  DVT prophylaxis: Bilateral SCDs. [facility]    Nursing rounds completed with Mallissa Check. Patient cleared for dc home today with outpatient follow-up as scheduled. Discharge instructions reviewed with patient and her . All questions answered. Call with questions/concerns.        Discharge Summary- Neurosurgery   [facility] 79 y.o. female MRN: [id number]  [facility] Encounter: [id number]      Admission Date:   Admission Orders (From admission, onward)       Ordered        11/01/23 0755  Inpatient Admission  Once                            Discharge Date: 11/3/2023    Admitting Diagnosis: Meningioma [facility]) [D32.9]    Discharge Diagnosis:   Same as above  HLD    Medical Problems       Resolved Problems  Date Reviewed: 11/1/2023   None         Attending Physician: Bebeto Clinton MD    Procedures Performed: Left frontal CRANIOTOMY IMAGE-GUIDED FOR TUMOR    Exam:   General appearance: alert, appears stated age, cooperative and no distress  Head: Normocephalic, without obvious abnormality, incision CDI, expected mild left facial edema   Eyes: EOMI, conjugate gaze, mild left periorbital ecchymosis  Neck: supple, symmetrical, trachea midline   Lungs: non labored breathing  Heart: regular heart rate  Neurologic:   Mental status: Alert, oriented, thought content appropriate  Cranial nerves: grossly intact (Cranial nerves II-XII)  Sensory: normal to crude touch  Motor: moving all extremities without focal weakness  Coordination: finger to nose normal bilaterally, no drift bilaterally     Hospital Course: This is a 63-year-old female with past medical history significant for hyperlipidemia who is being seen in the outpatient setting for 3 separate meningiomas located in the left frontal region. On surveillance imaging one of them had grown in size and recommendation was made for surgical resection. Patient was admitted for left frontal craniotomy image guided for tumor resection. Surgery was without complications. Intraoperative findings found mass consistent with meningioma. Patient's postoperative imaging demonstrated good resection. patient was mobilized with physical and occupational therapy and cleared for home. Pain was controlled on Tylenol. Patient was neurologically stable and cleared for discharge home with close outpatient follow-up.     Condition at Discharge: good     Discharge Instructions/Information to Patient and Family:   See after visit summary for information provided to patient and family. Provisions for Follow-Up Care:  See after visit summary for information related to follow-up care and any pertinent home health orders. Disposition: Home    Planned Readmission: No    Discharge Statement   I spent 20 minutes discharging the patient. This time was spent on the day of discharge. I had direct contact with the patient on the day of discharge. Additional documentation is required if more than 30 minutes were spent on discharge. Discharge Medications:  See after visit summary for reconciled discharge medications provided to patient and family.

## 2023-11-06 NOTE — TELEPHONE ENCOUNTER
Called patient to see how she is doing after surgery. Patient reports she is doing well overall and denies any incisional issues or fevers. Patient is able to ambulate around the house and complete ADLs. Educated the patient about the importance of preventing blood clots and provided measures how to prevent them. Patient denies any headaches, dizziness, nausea, vomiting, changes to her vision or mental status. Patient is aware to call 911 or present to the nearest ED with any neurological decline. Patient has moved her bowels since the surgery. Encouraged patient to take an over the counter stool softener, if she is taking narcotic pain medication. Encouraged fiber intake and fluids. Reviewed incision care with the patient. Advised that she may take a shower and gently wash the surgical site with soap and water or baby shampoo. Use clean wash cloth, towels, and clothing. Do not submerge in water until cleared by the surgeon. Do not apply any creams, ointments, or lotions to the site. Patient is aware to call the office if any redness, swelling, drainage, dehiscence of incision, or fever >100 F occurs. Patient is aware to call the office if any concerns or questions may arise. Reminded patient of her upcoming appointment with the date/time/location. Patient was appreciative for the call.

## 2023-11-06 NOTE — UTILIZATION REVIEW
NOTIFICATION OF ADMISSION DISCHARGE   This is a Notification of Discharge from [address] Please be advised that this patient has been discharge from our facility. Below you will find the admission and discharge date and time including the patient’s disposition. UTILIZATION REVIEW CONTACT:  Mariah Augustine  Utilization   Network Utilization Review Department  Phone: [phone] x carefully listen to the prompts. All voicemails are confidential.  Email: [email or web address]     ADMISSION INFORMATION  PRESENTATION DATE: 11/1/2023  6:11 AM  OBERVATION ADMISSION DATE:   INPATIENT ADMISSION DATE: 11/1/23  7:55 AM   DISCHARGE DATE: 11/3/2023 12:23 PM   DISPOSITION:Home/Self Care    Network Utilization Review Department  ATTENTION: Please call with any questions or concerns to [phone] and carefully listen to the prompts so that you are directed to the right person. All voicemails are confidential.   For Discharge needs, contact Care Management DC Support Team at [phone] opt. 2  Send all requests for admission clinical reviews, approved or denied determinations and any other requests to dedicated fax number below belonging to the campus where the patient is receiving treatment.  List of dedicated fax numbers for the Facilities:  [place] DENIALS (Administrative/Medical Necessity) [phone]   DISCHARGE SUPPORT TEAM (Network) [phone] [facility] (Maternity/NICU/Pediatrics) [phone]   [address] [facility] [address] [place] [address] [phone]   [facility] [phone]   [address] [place] [address]  [facility] [phone]

## 2023-11-07 PROCEDURE — 88307 TISSUE EXAM BY PATHOLOGIST: CPT | Performed by: PATHOLOGY

## 2023-11-07 PROCEDURE — 88360 TUMOR IMMUNOHISTOCHEM/MANUAL: CPT | Performed by: PATHOLOGY

## 2023-11-08 ENCOUNTER — TELEPHONE (OUTPATIENT)
Dept: NEUROSURGERY | Facility: CLINIC | Age: 68
End: 2023-11-08

## 2023-11-08 NOTE — TELEPHONE ENCOUNTER
PT and daughter called to get FMLA paperwork faxed, found in media and confirmed w/pt daughter that I will fax to number on top of form unless she wants to , she requested we fax and will let  know to expect it.

## 2023-11-08 NOTE — TELEPHONE ENCOUNTER
Received a call from patient stating she has been having indigestion and questioned what she can do. Returned call to patient who stated she had mixed baking soda  with water which has helped however is questioning if she should continue this or try something else. Advised she can continue this remedy if it is helpful or try other OTC medications. She stated otherwise she is doing well and has no complaints. She was appreciative of the call back.

## 2023-11-16 ENCOUNTER — OFFICE VISIT (OUTPATIENT)
Dept: NEUROSURGERY | Facility: CLINIC | Age: 68
End: 2023-11-16

## 2023-11-16 VITALS
HEART RATE: 84 BPM | BODY MASS INDEX: 26.94 KG/M2 | WEIGHT: 157.8 LBS | SYSTOLIC BLOOD PRESSURE: 110 MMHG | HEIGHT: 64 IN | OXYGEN SATURATION: 97 % | TEMPERATURE: 98.7 F | DIASTOLIC BLOOD PRESSURE: 68 MMHG

## 2023-11-16 DIAGNOSIS — D32.9 MENINGIOMA (HCC): ICD-10-CM

## 2023-11-16 DIAGNOSIS — Z48.89 AFTERCARE FOLLOWING SURGERY: Primary | ICD-10-CM

## 2023-11-16 PROCEDURE — 99024 POSTOP FOLLOW-UP VISIT: CPT | Performed by: NEUROLOGICAL SURGERY

## 2023-11-16 NOTE — PROGRESS NOTES
DISCUSSION SUMMARY   This is a 79 y.o. female who is 2 weeks status post a resection of a frontal convexity meningioma. On pathology this appears to be a grade 1 meningioma however there was some brain invasion suggestive of degeneration of a portion of the tumor. Is for these reasons that I think a hypervigilant approach is appropriate for her based on imaging. I would not recommend radiation for her at this point. We will plan on seeing her back at her 6-week visit. Return in 1 month (on 12/16/2023). Diagnosis ICD-10-CM Associated Orders   1. Aftercare following surgery  Z48.89            Chief Complaint   Patient presents with    Post-op     2 week s/p left frontal crani mening resection-11/01/2023        HPI 70-year-old female status post a craniotomy for resection of a frontal meningioma she is doing very well in general.  She has had several episodes where her vision has been affected which is suggestive of lid swelling obstructing her vision. I think it is unlikely that she is having any other episodes. I asked that she not drive until 2 weeks from the time of her last processing problem. She does have some global aches and pains and these may be secondary to her changes in activity. She does have some dizziness and weakness which is periodic lightheadedness which has occurred rarely but overall has periodic concentration difficulties in general she feels that she is slowly and    This patient has hx of intracranial meningiomas. We have recommended repeat MRI studies for follow up and surveillance. On these follow-ups 1one has increased in size. Patient denies  nausea or vomiting she has no changes in mental status. She overall is doing very well. She does have periodic headaches. Review of Systems   Constitutional: Negative. HENT: Negative. Negative for tinnitus. Eyes:  Positive for visual disturbance (left eye). Respiratory: Negative.   Negative for cough, shortness of breath and wheezing. Cardiovascular: Negative. Negative for chest pain. Gastrointestinal: Negative. Endocrine: Negative. Genitourinary: Negative. Musculoskeletal:  Positive for arthralgias. Negative for neck pain and neck stiffness. Skin: Negative. Allergic/Immunologic: Negative. Neurological:  Positive for dizziness, weakness (b/l legs/generalized), light-headedness, numbness (b/l fingers- improved since hospital) and headaches (intermittent slight left sided head pain). Negative for tremors, seizures and speech difficulty. Hematological: Negative. Psychiatric/Behavioral:  Positive for decreased concentration (short memory span) and sleep disturbance (occasionally). All other systems reviewed and are negative.   I reviewed her ROS    Vitals:    /68 (BP Location: Right arm, Patient Position: Sitting, Cuff Size: Standard)   Pulse 84   Temp 98.7 °F (37.1 °C) (Temporal)   Ht 5' 4" (1.626 m)   Wt 71.6 kg (157 lb 12.8 oz)   SpO2 97%   BMI 27.09 kg/m²     MEDICAL HISTORY  Past Medical History:   Diagnosis Date    Anxiety     Cellulitis     Chronic rhinitis     Last Assessed:6/6/2016    Colon polyp     GERD without esophagitis     Last Assessed:6/6/2016    Hyperlipidemia     Hypometabolism     Vitamin D deficiency      Past Surgical History:   Procedure Laterality Date    COLONOSCOPY      [place] CRNEC TREPHINE BONE FLAP MENINGIOMA SUPRATENTOR Left 11/1/2023    Procedure: Left frontal CRANIOTOMY IMAGE-GUIDED FOR TUMOR;  Surgeon: Agapito Sylvester MD;  [facility]: [facility];  Service: Neurosurgery    TONSILLECTOMY       Social History     Tobacco Use    Smoking status: Former     Packs/day: 1.50     Years: 30.00     Total pack years: 45.00     Types: Cigarettes     Start date: 28     Quit date: 1/1/2008     Years since quitting: 15.8    Smokeless tobacco: Never   Vaping Use    Vaping Use: Never used   Substance Use Topics    Alcohol use: Yes     Comment: social/anxiety    Drug use: Not Currently     Types: Marijuana     Comment: Previously      Family History   Problem Relation Age of Onset    Atrial fibrillation Mother     Coronary artery disease Mother     Atrial fibrillation Father     Hypertension Father     Breast cancer Maternal Grandmother 61        Current Outpatient Medications:     acetaminophen (TYLENOL) 325 mg tablet, Take 3 tablets (975 mg total) by mouth every 8 (eight) hours as needed for mild pain, Disp: , Rfl: 0    amLODIPine (NORVASC) 2.5 mg tablet, TAKE 1 TABLET (2.5 MG TOTAL) BY MOUTH DAILY, Disp: 90 tablet, Rfl: 1    Ascorbic Acid (VITAMIN C) 100 MG tablet, Take 100 mg by mouth daily, Disp: , Rfl:     atorvastatin (LIPITOR) 10 mg tablet, TAKE 1 TABLET DAILY, Disp: 90 tablet, Rfl: 1    docusate sodium (COLACE) 100 mg capsule, Take 1 capsule (100 mg total) by mouth 2 (two) times a day as needed for constipation, Disp: , Rfl: 0    estradiol (ESTRACE) 0.1 mg/g vaginal cream, Insert into the vagina Takes weekly on Monday evenings, Disp: , Rfl:     fluticasone (FLONASE) 50 mcg/act nasal spray, 1 SPRAY INTO EACH NOSTRIL DAILY, Disp: 48 g, Rfl: 1    polyethylene glycol (MIRALAX) 17 g packet, Take 17 g by mouth daily as needed (constipation), Disp: , Rfl: 0    levETIRAcetam (KEPPRA) 500 mg tablet, Take 1 tablet (500 mg total) by mouth every 12 (twelve) hours for 10 doses, Disp: 10 tablet, Rfl: 0     Allergies   Allergen Reactions    Sulfa Antibiotics Edema    Monascus Purpureus Went Yeast Other (See Comments)     Doesn't remember    Other Rash    Penicillins Rash        The following portions of the patient's history were updated by MA and reviewed by MD: allergies, current medications, past family history, past medical history, past social history, past surgical history, and problem list.    Physical Exam  Awake alert and oriented  Extraocular movements are intact  There is some periecchymosis which continues  Her incision is clean and dry and well-healed  Her power is 5 out of 5 bilaterally  Her station and gait is normal  Her speech is clear and comprehensible    RESULTS/DATA  I have personally reviewed pertinent films in PACS  Postoperative MRI scan demonstrates complete resection of the mass. There is a small ischemic region of the brain which is in the region of the brain invasion seen during the surgery. There is no visible contrast-enhancement remaining.

## 2023-12-05 ENCOUNTER — OFFICE VISIT (OUTPATIENT)
Dept: FAMILY MEDICINE CLINIC | Facility: CLINIC | Age: 68
End: 2023-12-05
Payer: COMMERCIAL

## 2023-12-05 VITALS
TEMPERATURE: 100.7 F | HEART RATE: 85 BPM | DIASTOLIC BLOOD PRESSURE: 84 MMHG | RESPIRATION RATE: 16 BRPM | BODY MASS INDEX: 29.7 KG/M2 | SYSTOLIC BLOOD PRESSURE: 134 MMHG | WEIGHT: 173 LBS

## 2023-12-05 DIAGNOSIS — J01.00 ACUTE NON-RECURRENT MAXILLARY SINUSITIS: Primary | ICD-10-CM

## 2023-12-05 DIAGNOSIS — D32.9 MENINGIOMA (HCC): ICD-10-CM

## 2023-12-05 DIAGNOSIS — I10 PRIMARY HYPERTENSION: ICD-10-CM

## 2023-12-05 LAB
SARS-COV-2 AG UPPER RESP QL IA: NEGATIVE
VALID CONTROL: NORMAL

## 2023-12-05 PROCEDURE — 87811 SARS-COV-2 COVID19 W/OPTIC: CPT | Performed by: FAMILY MEDICINE

## 2023-12-05 PROCEDURE — 99214 OFFICE O/P EST MOD 30 MIN: CPT | Performed by: FAMILY MEDICINE

## 2023-12-05 RX ORDER — AZITHROMYCIN 250 MG/1
TABLET, FILM COATED ORAL
Qty: 6 TABLET | Refills: 0 | Status: SHIPPED | OUTPATIENT
Start: 2023-12-05 | End: 2023-12-10

## 2023-12-05 NOTE — PROGRESS NOTES
Assessment/Plan:    No problem-specific Assessment & Plan notes found for this encounter. Covid neg  Dayron Coto suspected  Suggest mucinex DM and nasal rinses/sprays for next 3-4d but if fails to improve or gets worse, then suggest continue otc but add zpack  Risks/benefits of antibiotics advised    Htn stable    S/p meningioma surgery  Try to avoid excessive cough and intracranial pressure with congestion/illness    No oral lesions or tooth/gum disorders noted on exam     Diagnoses and all orders for this visit:    Acute non-recurrent maxillary sinusitis  -     azithromycin (ZITHROMAX) 250 mg tablet; Take 500mg on day 1, 250mg on days 2-5  -     POCT Rapid Covid Ag    Primary hypertension    Meningioma (HCC)          Return if symptoms worsen or fail to improve. Subjective:      Patient ID: Deuce Patel is a 79 y.o. female. Chief Complaint   Patient presents with    Nasal Congestion     SX 2 days           sas/cma       HPI  S/p meningoma surgeries early November  Right upper and lower teeth pain  Clear phlegm  Not feverish  Not coughing  No nasal congestion  Had covid vax    The following portions of the patient's history were reviewed and updated as appropriate: allergies, current medications, past family history, past medical history, past social history, past surgical history and problem list.    Review of Systems   Respiratory:  Negative for shortness of breath. Neurological:  Negative for dizziness.          Current Outpatient Medications   Medication Sig Dispense Refill    amLODIPine (NORVASC) 2.5 mg tablet TAKE 1 TABLET (2.5 MG TOTAL) BY MOUTH DAILY 90 tablet 1    Ascorbic Acid (VITAMIN C) 100 MG tablet Take 100 mg by mouth daily      atorvastatin (LIPITOR) 10 mg tablet TAKE 1 TABLET DAILY 90 tablet 1    azithromycin (ZITHROMAX) 250 mg tablet Take 500mg on day 1, 250mg on days 2-5 6 tablet 0    estradiol (ESTRACE) 0.1 mg/g vaginal cream Insert into the vagina Takes weekly on Monday evenings fluticasone (FLONASE) 50 mcg/act nasal spray 1 SPRAY INTO EACH NOSTRIL DAILY 48 g 1    polyethylene glycol (MIRALAX) 17 g packet Take 17 g by mouth daily as needed (constipation)  0    acetaminophen (TYLENOL) 325 mg tablet Take 3 tablets (975 mg total) by mouth every 8 (eight) hours as needed for mild pain (Patient not taking: Reported on 12/5/2023)  0    levETIRAcetam (KEPPRA) 500 mg tablet Take 1 tablet (500 mg total) by mouth every 12 (twelve) hours for 10 doses 10 tablet 0     No current facility-administered medications for this visit. Objective:    /84   Pulse 85   Temp (!) 100.7 °F (38.2 °C)   Resp 16   Wt 78.5 kg (173 lb)   BMI 29.70 kg/m²        Physical Exam  Vitals and nursing note reviewed. Constitutional:       Appearance: She is well-developed. She is not ill-appearing. HENT:      Head: Normocephalic. Right Ear: Tympanic membrane normal.      Left Ear: Tympanic membrane normal.      Nose: Congestion present. Comments: Red turbinates  Eyes:      General: No scleral icterus. Conjunctiva/sclera: Conjunctivae normal.   Cardiovascular:      Rate and Rhythm: Normal rate and regular rhythm. Pulmonary:      Effort: Pulmonary effort is normal. No respiratory distress. Breath sounds: No wheezing or rales. Abdominal:      Palpations: Abdomen is soft. Musculoskeletal:         General: No deformity. Cervical back: Neck supple. Skin:     General: Skin is warm and dry. Coloration: Skin is not pale. Neurological:      Mental Status: She is alert. Motor: No weakness. Gait: Gait normal.   Psychiatric:         Mood and Affect: Mood normal.         Behavior: Behavior normal.         Thought Content:  Thought content normal.                Yogi Celis DO

## 2023-12-19 ENCOUNTER — OFFICE VISIT (OUTPATIENT)
Dept: NEUROSURGERY | Facility: CLINIC | Age: 68
End: 2023-12-19

## 2023-12-19 VITALS
HEART RATE: 70 BPM | SYSTOLIC BLOOD PRESSURE: 116 MMHG | HEIGHT: 64 IN | RESPIRATION RATE: 17 BRPM | OXYGEN SATURATION: 97 % | DIASTOLIC BLOOD PRESSURE: 78 MMHG | BODY MASS INDEX: 29.21 KG/M2 | WEIGHT: 171.1 LBS | TEMPERATURE: 97.8 F

## 2023-12-19 DIAGNOSIS — Z48.89 AFTERCARE FOLLOWING SURGERY: ICD-10-CM

## 2023-12-19 DIAGNOSIS — Z09 POSTOPERATIVE EXAMINATION: ICD-10-CM

## 2023-12-19 DIAGNOSIS — D32.9 BENIGN NEOPLASM OF MENINGES (HCC): Primary | ICD-10-CM

## 2023-12-19 PROCEDURE — 99024 POSTOP FOLLOW-UP VISIT: CPT | Performed by: NEUROLOGICAL SURGERY

## 2023-12-19 NOTE — PROGRESS NOTES
"DISCUSSION SUMMARY   This is a 68 y.o. female status post craniotomy for resection of tumor.  She requires a follow-up MRI in 6 months time with further recommendations at that time.    Return in about 6 months (around 6/19/2024) for follow-up after test.      Diagnosis ICD-10-CM Associated Orders   1. Benign neoplasm of meninges (HCC)  D32.9 MRI brain w wo contrast      2. Aftercare following surgery  Z48.89 MRI brain w wo contrast      3. Postoperative examination  Z09 MRI brain w wo contrast           Chief Complaint   Patient presents with    Post-op        HPI 88-year-old female status post a left frontal craniotomy for resection of tumor on November 1.  She is doing very well.  She is asymptomatic at this point.    Review of Systems   Constitutional: Negative.    HENT: Negative.  Negative for tinnitus.    Respiratory: Negative.  Negative for cough, shortness of breath and wheezing.    Cardiovascular: Negative.  Negative for chest pain.   Gastrointestinal: Negative.    Endocrine: Negative.    Genitourinary: Negative.    Musculoskeletal:  Positive for arthralgias. Negative for neck pain and neck stiffness.   Skin: Negative.    Allergic/Immunologic: Negative.    Neurological: Negative.  Negative for tremors, seizures, speech difficulty, weakness, light-headedness and numbness.   Hematological: Negative.    Psychiatric/Behavioral:  Negative for sleep disturbance (occasionally).    All other systems reviewed and are negative.  I reviewed the ROS.    Vitals:    /78 (BP Location: Right arm, Patient Position: Sitting, Cuff Size: Standard)   Pulse 70   Temp 97.8 °F (36.6 °C) (Temporal)   Resp 17   Ht 5' 4\" (1.626 m)   Wt 77.6 kg (171 lb 1.6 oz)   SpO2 97%   BMI 29.37 kg/m²     MEDICAL HISTORY  Past Medical History:   Diagnosis Date    Anxiety     Cellulitis     Chronic rhinitis     Last Assessed:6/6/2016    Colon polyp     GERD without esophagitis     Last Assessed:6/6/2016    Hyperlipidemia     " Hypometabolism     Vitamin D deficiency      Past Surgical History:   Procedure Laterality Date    COLONOSCOPY      [place] CRNEC TREPHINE BONE FLAP MENINGIOMA SUPRATENTOR Left 2023    Procedure: Left frontal CRANIOTOMY IMAGE-GUIDED FOR TUMOR;  Surgeon: Sigifredo Sagastume MD;  [facility];  Service: Neurosurgery    TONSILLECTOMY       Social History     Tobacco Use    Smoking status: Former     Current packs/day: 0.00     Average packs/day: 1.5 packs/day for 30.0 years (45.1 ttl pk-yrs)     Types: Cigarettes     Start date: 6/15/1987     Quit date: 2008     Years since quittin.0    Smokeless tobacco: Never   Vaping Use    Vaping status: Never Used   Substance Use Topics    Alcohol use: Yes     Comment: social/anxiety    Drug use: Not Currently     Types: Marijuana     Comment: Previously      Family History   Problem Relation Age of Onset    Atrial fibrillation Mother     Coronary artery disease Mother     Atrial fibrillation Father     Hypertension Father     Breast cancer Maternal Grandmother 60        Current Outpatient Medications:     amLODIPine (NORVASC) 2.5 mg tablet, TAKE 1 TABLET (2.5 MG TOTAL) BY MOUTH DAILY, Disp: 90 tablet, Rfl: 1    Ascorbic Acid (VITAMIN C) 100 MG tablet, Take 100 mg by mouth daily, Disp: , Rfl:     atorvastatin (LIPITOR) 10 mg tablet, TAKE 1 TABLET DAILY, Disp: 90 tablet, Rfl: 1    estradiol (ESTRACE) 0.1 mg/g vaginal cream, Insert into the vagina Takes weekly on Monday evenings, Disp: , Rfl:     fluticasone (FLONASE) 50 mcg/act nasal spray, 1 SPRAY INTO EACH NOSTRIL DAILY, Disp: 48 g, Rfl: 1    polyethylene glycol (MIRALAX) 17 g packet, Take 17 g by mouth daily as needed (constipation), Disp: , Rfl: 0    acetaminophen (TYLENOL) 325 mg tablet, Take 3 tablets (975 mg total) by mouth every 8 (eight) hours as needed for mild pain (Patient not taking: Reported on 2023), Disp: , Rfl: 0     Allergies   Allergen Reactions    Sulfa Antibiotics Edema    Monascus  Purpureus Went Yeast Other (See Comments)     Doesn't remember    Other Rash    Penicillins Rash        The following portions of the patient's history were updated by MA and reviewed by MD: allergies, current medications, past family history, past medical history, past social history, past surgical history, and problem list.    Physical Exam  Incision is clean and dry and healing well  She has no drift  Her power is 5 out of 5 bilaterally  Extraocular movements are intact  Face is symmetric in grimace and at rest    RESULTS/DATA  I have personally reviewed pertinent films in PACS

## 2024-01-01 NOTE — TELEPHONE ENCOUNTER
Pt is scheduled for appt on 07/28/22 please fill medication 
Return in about 6 months (around 5/18/2022)  Please schedule    Verna Hopper
(V5) coos and babbles

## 2024-01-16 ENCOUNTER — HOSPITAL ENCOUNTER (OUTPATIENT)
Dept: RADIOLOGY | Facility: HOSPITAL | Age: 69
Discharge: HOME | End: 2024-01-16
Payer: COMMERCIAL

## 2024-01-16 VITALS — BODY MASS INDEX: 29.02 KG/M2 | WEIGHT: 170 LBS | HEIGHT: 64 IN

## 2024-01-16 DIAGNOSIS — Z12.31 SCREENING MAMMOGRAM, ENCOUNTER FOR: ICD-10-CM

## 2024-01-16 PROCEDURE — 77063 BREAST TOMOSYNTHESIS BI: CPT

## 2024-01-16 PROCEDURE — 77067 SCR MAMMO BI INCL CAD: CPT

## 2024-01-22 DIAGNOSIS — E78.2 MIXED HYPERLIPIDEMIA: ICD-10-CM

## 2024-01-22 RX ORDER — ATORVASTATIN CALCIUM 10 MG/1
TABLET, FILM COATED ORAL
Qty: 90 TABLET | Refills: 1 | Status: SHIPPED | OUTPATIENT
Start: 2024-01-22

## 2024-02-03 PROBLEM — J01.00 ACUTE NON-RECURRENT MAXILLARY SINUSITIS: Status: RESOLVED | Noted: 2023-12-05 | Resolved: 2024-02-03

## 2024-02-15 DIAGNOSIS — D32.9 MENINGIOMA (HCC): ICD-10-CM

## 2024-02-15 DIAGNOSIS — R94.39 ABNORMAL STRESS ECG WITH TREADMILL: ICD-10-CM

## 2024-02-15 DIAGNOSIS — I10 PRIMARY HYPERTENSION: ICD-10-CM

## 2024-02-15 DIAGNOSIS — R01.1 CARDIAC MURMUR: ICD-10-CM

## 2024-02-15 DIAGNOSIS — Z82.49 FAMILY HISTORY OF HEART DISEASE: ICD-10-CM

## 2024-02-15 DIAGNOSIS — E78.2 MIXED HYPERLIPIDEMIA: ICD-10-CM

## 2024-02-15 RX ORDER — AMLODIPINE BESYLATE 2.5 MG/1
2.5 TABLET ORAL DAILY
Qty: 90 TABLET | Refills: 0 | Status: SHIPPED | OUTPATIENT
Start: 2024-02-15

## 2024-02-27 ENCOUNTER — OFFICE VISIT (OUTPATIENT)
Dept: URGENT CARE | Facility: CLINIC | Age: 69
End: 2024-02-27
Payer: COMMERCIAL

## 2024-02-27 ENCOUNTER — APPOINTMENT (OUTPATIENT)
Dept: RADIOLOGY | Facility: CLINIC | Age: 69
End: 2024-02-27
Payer: COMMERCIAL

## 2024-02-27 VITALS
OXYGEN SATURATION: 95 % | BODY MASS INDEX: 31.24 KG/M2 | RESPIRATION RATE: 14 BRPM | TEMPERATURE: 96.4 F | HEART RATE: 80 BPM | WEIGHT: 182 LBS

## 2024-02-27 DIAGNOSIS — S69.92XA INJURY OF LEFT WRIST, INITIAL ENCOUNTER: ICD-10-CM

## 2024-02-27 DIAGNOSIS — S52.532A CLOSED COLLES' FRACTURE OF LEFT RADIUS, INITIAL ENCOUNTER: Primary | ICD-10-CM

## 2024-02-27 PROCEDURE — 73110 X-RAY EXAM OF WRIST: CPT

## 2024-02-27 PROCEDURE — 25600 CLTX DST RDL FX/EPHYS SEP WO: CPT | Performed by: PHYSICIAN ASSISTANT

## 2024-02-27 PROCEDURE — 99213 OFFICE O/P EST LOW 20 MIN: CPT | Performed by: PHYSICIAN ASSISTANT

## 2024-02-27 NOTE — PROGRESS NOTES
[facility]        NAME: Maira Colby is a 68 y.o. female  : 1955    MRN: [id number]  DATE: 2024  TIME: 3:53 PM    Assessment and Plan   Closed Colles' fracture of left radius, initial encounter [S52.532A]  1. Closed Colles' fracture of left radius, initial encounter        2. Injury of left wrist, initial encounter  XR wrist 3+ vw left          Patient Instructions   Colles' fracture  Ortho splint applied  Sling applied  Referred to ortho  Ice as needed  Tylenol/ibuprofen as needed for pain    Follow up with PCP in 3-5 days.  Proceed to  ER if symptoms worsen.    Chief Complaint     Chief Complaint   Patient presents with    Wrist Injury     Pt presents with injury of the left wrist r/t falling today in street         History of Present Illness       Maira is a 68-year-old female who presents to the clinic complaining of left wrist pain x 1 day.  She states today while walking on pavement she tripped and fell on outstretched hands she notes swelling and bruising of the left wrist.  She denies any paresthesias.  She has a small abrasions on her right hand.  Denies any elbow or shoulder pain.  She denies any head trauma or loss of consciousness.  She has normal range of motion of her fingers but decreased range of motion of the wrist.        Review of Systems   Review of Systems   Constitutional: Negative.    Musculoskeletal:  Positive for arthralgias and joint swelling.         Current Medications       Current Outpatient Medications:     amLODIPine (NORVASC) 2.5 mg tablet, TAKE 1 TABLET DAILY, Disp: 90 tablet, Rfl: 0    Ascorbic Acid (VITAMIN C) 100 MG tablet, Take 100 mg by mouth daily, Disp: , Rfl:     atorvastatin (LIPITOR) 10 mg tablet, TAKE 1 TABLET DAILY, Disp: 90 tablet, Rfl: 1    estradiol (ESTRACE) 0.1 mg/g vaginal cream, Insert into the vagina Takes weekly on Monday evenings, Disp: , Rfl:     fluticasone (FLONASE) 50 mcg/act nasal spray, 1 SPRAY INTO EACH NOSTRIL DAILY,  Disp: 48 g, Rfl: 1    acetaminophen (TYLENOL) 325 mg tablet, Take 3 tablets (975 mg total) by mouth every 8 (eight) hours as needed for mild pain (Patient not taking: Reported on 12/5/2023), Disp: , Rfl: 0    polyethylene glycol (MIRALAX) 17 g packet, Take 17 g by mouth daily as needed (constipation) (Patient not taking: Reported on 2/27/2024), Disp: , Rfl: 0    Current Allergies     Allergies as of 02/27/2024 - Reviewed 01/16/2024   Allergen Reaction Noted    Sulfa antibiotics Edema 09/26/2013    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     Past Medical History:   Diagnosis Date    Anxiety     Cellulitis     Chronic rhinitis     Last Assessed:6/6/2016    Colon polyp     GERD without esophagitis     Last Assessed:6/6/2016    Hyperlipidemia     Hypometabolism     Vitamin D deficiency        Past Surgical History:   Procedure Laterality Date    COLONOSCOPY      [place] CRNEC TREPHINE BONE FLAP MENINGIOMA SUPRATENTOR Left 11/1/2023    Procedure: Left frontal CRANIOTOMY IMAGE-GUIDED FOR TUMOR;  Surgeon: Sigifredo Sagastume MD;  Location: [facility];  Service: Neurosurgery    TONSILLECTOMY         Family History   Problem Relation Age of Onset    Atrial fibrillation Mother     Coronary artery disease Mother     Atrial fibrillation Father     Hypertension Father     Breast cancer Maternal Grandmother 60         Medications have been verified.        Objective   Pulse 80   Temp (!) 96.4 °F (35.8 °C)   Resp 14   Wt 82.6 kg (182 lb)   SpO2 95%   BMI 31.24 kg/m²   No LMP recorded. Patient is postmenopausal.       Physical Exam     Physical Exam  Vitals and nursing note reviewed.   Constitutional:       General: She  is not in acute distress.     Appearance: Normal appearance. She is not ill-appearing.   Pulmonary:      Effort: Pulmonary effort is normal.   Musculoskeletal:      Left elbow: Normal.      Left forearm: Normal.      Left wrist: Swelling, tenderness and bony tenderness present. No deformity, lacerations, snuff box tenderness or crepitus. Decreased range of motion.   Neurological:      Mental Status: She is alert and oriented to person, place, and time.   Psychiatric:         Mood and Affect: Mood normal.         Behavior: Behavior normal.     Orthopedic injury treatment    Date/Time: 2/27/2024 3:30 PM    Performed by: Darlene Crews PA-C  Authorized by: Darlene Crews PA-C    Patient Location:  [facility] Protocol:  Consent: Verbal consent obtained.  Risks and benefits: risks, benefits and alternatives were discussed  Consent given by: patient  Patient understanding: patient states understanding of the procedure being performed    Injury location:  Wrist  Location details:  Left wrist  Injury type:  Fracture  Fracture type: distal radius    Fracture type: distal radius    Neurovascular status: Neurovascularly intact    Distal perfusion: normal    Neurological function: normal    Range of motion: reduced    Immobilization:  Splint and sling  Splint type:  Sugar tong  Supplies used:  Cotton padding, elastic bandage and Ortho-Glass  Neurovascular status: Neurovascularly intact    Distal perfusion: normal    Neurological function: normal    Range of motion: unchanged    Patient tolerance:  Patient tolerated the procedure well with no immediate complications

## 2024-02-28 ENCOUNTER — TELEPHONE (OUTPATIENT)
Age: 69
End: 2024-02-28

## 2024-02-28 ENCOUNTER — OFFICE VISIT (OUTPATIENT)
Dept: OBGYN CLINIC | Facility: CLINIC | Age: 69
End: 2024-02-28
Payer: COMMERCIAL

## 2024-02-28 ENCOUNTER — HOSPITAL ENCOUNTER (OUTPATIENT)
Dept: RADIOLOGY | Facility: HOSPITAL | Age: 69
Discharge: HOME | End: 2024-02-28
Attending: SURGERY
Payer: COMMERCIAL

## 2024-02-28 VITALS — HEIGHT: 64 IN | WEIGHT: 182 LBS | BODY MASS INDEX: 31.07 KG/M2

## 2024-02-28 DIAGNOSIS — Z01.812 PRE-PROCEDURE LAB EXAM: ICD-10-CM

## 2024-02-28 DIAGNOSIS — S52.532A CLOSED COLLES' FRACTURE OF LEFT RADIUS, INITIAL ENCOUNTER: ICD-10-CM

## 2024-02-28 DIAGNOSIS — S52.502A DISPLACED FRACTURE OF DISTAL END OF LEFT RADIUS: ICD-10-CM

## 2024-02-28 DIAGNOSIS — S52.571A CLOSED INTRA-ARTICULAR DIE-PUNCH FRACTURE OF RIGHT RADIUS, INITIAL ENCOUNTER: ICD-10-CM

## 2024-02-28 DIAGNOSIS — S52.502A DISPLACED FRACTURE OF DISTAL END OF LEFT RADIUS: Primary | ICD-10-CM

## 2024-02-28 PROCEDURE — 99204 OFFICE O/P NEW MOD 45 MIN: CPT | Performed by: SURGERY

## 2024-02-28 PROCEDURE — 73200 CT UPPER EXTREMITY W/O DYE: CPT

## 2024-02-28 NOTE — TELEPHONE ENCOUNTER
Caller: Patient    Doctor: Hand Specialist    Reason for call:     Patient has a fracture, Closed Colles' fracture of left radius, she wants an appointment today, xrays in [facility], Brandt has appt for Friday, but she wanted one for today.  Can we please assist her with appointment.  Forced appointment for today.    Call back#: [phone]

## 2024-02-28 NOTE — PROGRESS NOTES
Man Durant M.D.  Attending, Orthopaedic Surgery  Hand, Wrist, and Elbow Surgery  [facility] Orthopaedic [facility]      ORTHOPAEDIC HAND, WRIST, AND ELBOW OFFICE  VISIT       ASSESSMENT/PLAN:      68 year old female here for her displaced intra-articular left distal radius fracture, 2/27/2024. Xrays from 2/27/2024 were reviewed noting that there are multiple pieces and the quality of bone is questionable in regards to fixation with hardware. It's recommended to obtain a STAT CT scan of the left wrist with 3D reconstruction to further evaluate the pieces to determine surgical planning. At this time on exam, the patient does not have numbness/tingling and has function motion in the fingers.   We will follow up with the patient tomorrow to review the STAT CT scan to discuss surgical approach of a volar plate vs volar plate with a dorsal spanning plate. Surgery would be planned for Friday at [facility]. Patient shows understanding and agrees with this plan of care.     The patient verbalized understanding of exam findings and treatment plan. We engaged in the shared decision-making process and treatment options were discussed at length with the patient. Surgical and conservative management discussed today along with risks and benefits.    Diagnoses and all orders for this visit:    Displaced fracture of distal end of left radius  -     CT upper extremity wo contrast left; Future    Closed Colles' fracture of left radius, initial encounter  -     Ambulatory Referral to Orthopedic Surgery        Follow Up:  Return for Follow up tomorrow to review.    To Do Next Visit:  Re-evaluation of current issue      ____________________________________________________________________________________________________________________________________________      CHIEF COMPLAINT:  Chief Complaint   Patient presents with    Left Wrist - Fracture       SUBJECTIVE:  Maira Colby is a 68 y.o. year old RHD female who presents today  for an initial evaluation for her left displaced distal radius fracture, 2024. She was seen at urgent care same day after she had a fall onto the left wrist on the street. She was power walking. She is 4 months s/p left frontal craniotomy for meningioma x3, 2023 by Dr. Sagastume.       Pain/symptom timing:  Worse during the day when active  Pain/symptom context:  Worse with activites and work  Pain/symptom modifying factors:  Rest makes better, activities make worse  Pain/symptom associated signs/symptoms: none    Prior treatment   NSAIDsNo   Injections No   Bracing/Orthotics Yes    Physical Therapy No     I have personally reviewed all the relevant PMH, PSH, SH, FH, Medications and allergies      PAST MEDICAL HISTORY:  Past Medical History:   Diagnosis Date    Anxiety     Cellulitis     Chronic rhinitis     Last Assessed:2016    Colon polyp     GERD without esophagitis     Last Assessed:2016    Hyperlipidemia     Hypometabolism     Vitamin D deficiency        PAST SURGICAL HISTORY:  Past Surgical History:   Procedure Laterality Date    COLONOSCOPY      [place] CRNEC TREPHINE BONE FLAP MENINGIOMA SUPRATENTOR Left 2023    Procedure: Left frontal CRANIOTOMY IMAGE-GUIDED FOR TUMOR;  Surgeon: Sigifredo Sagastume MD;  Location: [facility];  Service: Neurosurgery    TONSILLECTOMY         FAMILY HISTORY:  Family History   Problem Relation Age of Onset    Atrial fibrillation Mother     Coronary artery disease Mother     Atrial fibrillation Father     Hypertension Father     Breast cancer Maternal Grandmother 60       SOCIAL HISTORY:  Social History     Tobacco Use    Smoking status: Former     Current packs/day: 0.00     Average packs/day: 1.5 packs/day for 30.0 years (45.1 ttl pk-yrs)     Types: Cigarettes     Start date: 6/15/1987     Quit date: 2008     Years since quittin.1     Passive exposure: Past    Smokeless tobacco: Never   Vaping Use    Vaping status: Never Used   Substance Use Topics     Alcohol use: Yes     Comment: social/anxiety    Drug use: Not Currently     Types: Marijuana     Comment: Previously       MEDICATIONS:    Current Outpatient Medications:     amLODIPine (NORVASC) 2.5 mg tablet, TAKE 1 TABLET DAILY, Disp: 90 tablet, Rfl: 0    Ascorbic Acid (VITAMIN C) 100 MG tablet, Take 100 mg by mouth daily, Disp: , Rfl:     atorvastatin (LIPITOR) 10 mg tablet, TAKE 1 TABLET DAILY, Disp: 90 tablet, Rfl: 1    estradiol (ESTRACE) 0.1 mg/g vaginal cream, Insert into the vagina Takes weekly on Monday evenings, Disp: , Rfl:     fluticasone (FLONASE) 50 mcg/act nasal spray, 1 SPRAY INTO EACH NOSTRIL DAILY, Disp: 48 g, Rfl: 1    polyethylene glycol (MIRALAX) 17 g packet, Take 17 g by mouth daily as needed (constipation), Disp: , Rfl: 0    acetaminophen (TYLENOL) 325 mg tablet, Take 3 tablets (975 mg total) by mouth every 8 (eight) hours as needed for mild pain (Patient not taking: Reported on 2/28/2024), Disp: , Rfl: 0    ALLERGIES:  Allergies   Allergen Reactions    Sulfa Antibiotics Edema    Monascus Purpureus Went Yeast Other (See Comments)     Doesn't remember    Other Rash    Penicillins Rash           REVIEW OF SYSTEMS:  Review of Systems   Constitutional:  Negative for chills, fever and unexpected weight change.   HENT:  Negative for hearing loss, nosebleeds and sore throat.    Eyes:  Negative for pain, redness and visual disturbance.   Respiratory:  Negative for cough, shortness of breath and wheezing.    Cardiovascular:  Negative for chest pain, palpitations and leg swelling.   Gastrointestinal:  Negative for abdominal pain and nausea.   Genitourinary:  Negative for dyspareunia, dysuria and frequency.   Musculoskeletal:  Positive for joint swelling.   Skin:  Negative for rash and wound.   Neurological:  Negative for dizziness, numbness and headaches.   Psychiatric/Behavioral:  Negative for decreased concentration and suicidal ideas. The patient is not nervous/anxious.        VITALS:  There  were no vitals filed for this visit.    LABS:  HgA1c:   Lab Results   Component Value Date    HGBA1C 5.7 (H) 10/18/2023     BMP:   Lab Results   Component Value Date    GLUCOSE 83 04/28/2017    CALCIUM 8.0 (L) 11/02/2023     04/28/2017    K 3.9 11/02/2023    CO2 24 11/02/2023     11/02/2023    BUN 8 11/02/2023    CREATININE 0.62 11/02/2023       _____________________________________________________  PHYSICAL EXAMINATION:  General: well developed and well nourished, alert, oriented times 3, and appears comfortable  Psychiatric: Normal  HEENT: Normocephalic, Atraumatic Trachea Midline, No torticollis  Pulmonary: No audible wheezing or respiratory distress   Abdomen/GI: Non tender, non distended   Cardiovascular: No pitting edema, 2+ radial pulse   Skin: No masses, erythema, lacerations, fluctation, ulcerations  Neurovascular: Sensation Intact to the Median, Ulnar, Radial Nerve, Motor Intact to the Median, Ulnar, Radial Nerve, and Pulses Intact  Musculoskeletal: Normal, except as noted in detailed exam and in HPI.      MUSCULOSKELETAL EXAMINATION:    Left wrist:  Well fit splint no skin abrasions  Can bring fingers into a fist  Swelling noted in fingers  Sensation intact to light touch distally  Brisk capillary refill     ___________________________________________________  STUDIES REVIEWED:  I have personally reviewed AP lateral and oblique radiographs of left wrist 3 views from 2/27/2024 which demonstrate comminuted distal radius fracture with volar ulnar corner displacement and intra articular depression          PROCEDURES PERFORMED:  Procedures  No Procedures performed today    _____________________________________________________      Scribe Attestation      I,:  Linda Rao am acting as a scribe while in the presence of the attending physician.:       I,:  Man Duratn MD personally performed the services described in this documentation    as scribed in my presence.:

## 2024-02-29 ENCOUNTER — OFFICE VISIT (OUTPATIENT)
Dept: OBGYN CLINIC | Facility: CLINIC | Age: 69
End: 2024-02-29
Payer: COMMERCIAL

## 2024-02-29 ENCOUNTER — ANESTHESIA EVENT (OUTPATIENT)
Age: 69
End: 2024-02-29
Payer: COMMERCIAL

## 2024-02-29 ENCOUNTER — APPOINTMENT (OUTPATIENT)
Dept: LAB | Facility: AMBULARY SURGERY CENTER | Age: 69
End: 2024-02-29
Payer: COMMERCIAL

## 2024-02-29 VITALS
HEIGHT: 64 IN | SYSTOLIC BLOOD PRESSURE: 127 MMHG | WEIGHT: 182 LBS | HEART RATE: 59 BPM | BODY MASS INDEX: 31.07 KG/M2 | DIASTOLIC BLOOD PRESSURE: 85 MMHG

## 2024-02-29 DIAGNOSIS — S52.502A DISPLACED FRACTURE OF DISTAL END OF LEFT RADIUS: ICD-10-CM

## 2024-02-29 DIAGNOSIS — S52.532A CLOSED COLLES' FRACTURE OF LEFT RADIUS, INITIAL ENCOUNTER: ICD-10-CM

## 2024-02-29 DIAGNOSIS — S52.571A CLOSED INTRA-ARTICULAR DIE-PUNCH FRACTURE OF RIGHT RADIUS, INITIAL ENCOUNTER: ICD-10-CM

## 2024-02-29 DIAGNOSIS — S52.572A CLOSED INTRA-ARTICULAR DIE-PUNCH FRACTURE OF LEFT RADIUS, INITIAL ENCOUNTER: ICD-10-CM

## 2024-02-29 DIAGNOSIS — S52.502A DISPLACED FRACTURE OF DISTAL END OF LEFT RADIUS: Primary | ICD-10-CM

## 2024-02-29 DIAGNOSIS — Z01.812 PRE-PROCEDURE LAB EXAM: ICD-10-CM

## 2024-02-29 LAB
ANION GAP SERPL CALCULATED.3IONS-SCNC: 7 MMOL/L
BUN SERPL-MCNC: 10 MG/DL (ref 5–25)
CALCIUM SERPL-MCNC: 9.7 MG/DL (ref 8.4–10.2)
CHLORIDE SERPL-SCNC: 106 MMOL/L (ref 96–108)
CO2 SERPL-SCNC: 27 MMOL/L (ref 21–32)
CREAT SERPL-MCNC: 0.61 MG/DL (ref 0.6–1.3)
GFR SERPL CREATININE-BSD FRML MDRD: 94 ML/MIN/1.73SQ M
GLUCOSE SERPL-MCNC: 86 MG/DL (ref 65–140)
POTASSIUM SERPL-SCNC: 3.9 MMOL/L (ref 3.5–5.3)
SODIUM SERPL-SCNC: 140 MMOL/L (ref 135–147)

## 2024-02-29 PROCEDURE — 80048 BASIC METABOLIC PNL TOTAL CA: CPT

## 2024-02-29 PROCEDURE — 36415 COLL VENOUS BLD VENIPUNCTURE: CPT

## 2024-02-29 PROCEDURE — 99215 OFFICE O/P EST HI 40 MIN: CPT | Performed by: SURGERY

## 2024-02-29 RX ORDER — SODIUM CHLORIDE 9 MG/ML
100 INJECTION, SOLUTION INTRAVENOUS CONTINUOUS
Status: CANCELLED | OUTPATIENT
Start: 2024-03-01

## 2024-02-29 RX ORDER — CHLORHEXIDINE GLUCONATE ORAL RINSE 1.2 MG/ML
15 SOLUTION DENTAL ONCE
Status: CANCELLED | OUTPATIENT
Start: 2024-02-29 | End: 2024-02-29

## 2024-02-29 NOTE — H&P (VIEW-ONLY)
ASSESSMENT/PLAN:      68 year old female here for her left displaced distal radius fracture, 2/27/2024. The CT scan of the left UE was reviewed with the patient noting that at this time, we will plan to proceed with a volar plate, but if the bone is of poor quality, a dorsal wrist plate will be needed.   Discussed a with the risks in depth of an ORIF of the left distal radius with possible carpal tunnel release. This will be under regional with sedation At the [facility].  The regional block will last for approximately 16 hours and then her pain will be managed will take Ibuprofen 600mg and Tylenol with Oxycodone.   She will meet with Anaethesia to ensure she can endure the stress on her previous brain surgery. There is a risk in the future of EPL rupture from the tendon rubbing over the plate. And if the plate is too distal in order to get fixation, there may be a need to remove the plate due to FPL rupture risks. There is also a risk of developing carpal tunnel syndrome due to the nature of the fracture and the amount of swelling. She will be in outpatient OT within 3 days to work on motion.   Patient shows understanding and agrees with this plan of care. We will follow up post op.     The patient verbalized understanding of exam findings and treatment plan. We engaged in the shared decision-making process and treatment options were discussed at length with the patient. Surgical and conservative management discussed today along with risks and benefits.    Diagnoses and all orders for this visit:    Displaced fracture of distal end of left radius    Closed intra-articular die-punch fracture of left radius, initial encounter          Fracture Operative Treatment: The physiology of a fractured bone was discussed with the patient today.  With displaced fractures, operative treatment often results in a functional recovery.  Typically, these fractures undergo reduction either through percutaneous or open methods  depending on the location and fracture pattern.  Radiographs are typically taken at intervals throughout the fracture healing ensure maintenance of reduction and alignment.  If the fracture loses its alignment, revision surgery may be required.  Medical conditions such as diabetes, osteoporosis, vitamin D deficiency, and a history of or exposure to smoking may delay or prevent fracture healing.  The risks and benefits of the procedure were explained to the patient, which include, but are not limited to: Bleeding, infection, recurrence, pain, scar, malunion, nonunion, damage to tendons, damage to nerves, and damage to blood vessels, and complications related to anesthesia, failure to give desire result, need for more surgery.  These risks, along with alternative conservative treatment options, and postoperative protocols were voiced back and understood by the patient.  All questions were answered to the patient's satisfaction.  The patient agrees to comply with a standard postoperative protocol, and is willing to proceed.  Education was provided via written and auditory forms.  There were no barriers to learning.  Written handouts regarding wound care, incision and scar care, and general preoperative information was provided to the patient.  Prior to surgery, the patient may be requested to stop all anti-inflammatory medications.  Prophylactic aspirin, Plavix, and Coumadin may be allowed to be continued.  Medications including vitamin E., ginkgo, and fish oil are requested to be stopped approximately one week prior to surgery.  Hypertensive medications and beta blockers, if taken, should be continued.    Follow Up:  Return for Follow up post- op.      To Do Next Visit:  Re-evaluation of current issue    ____________________________________________________________________________________________________________________________________________      CHIEF COMPLAINT:  Chief Complaint   Patient presents with    Left Wrist  - Follow-up       SUBJECTIVE:  Maira Colby is a 68 y.o. year old RHD female who presents today to discuss a STAT CT scan of the left UE due to left displaced distal radius fracture, 2024. She was seen at urgent care same day after she had a fall onto the left wrist on the street. She was power walking. She is 4 months s/p left frontal craniotomy for meningioma x3, 2023 by Dr. Sagastume.         I have personally reviewed all the relevant PMH, PSH, SH, FH, Medications and allergies.     PAST MEDICAL HISTORY:  Past Medical History:   Diagnosis Date    Anxiety     Cellulitis     Chronic rhinitis     Last Assessed:2016    Colon polyp     GERD without esophagitis     Last Assessed:2016    Hyperlipidemia     Hypometabolism     Vitamin D deficiency        PAST SURGICAL HISTORY:  Past Surgical History:   Procedure Laterality Date    COLONOSCOPY      [place] CRNEC TREPHINE BONE FLAP MENINGIOMA SUPRATENTOR Left 2023    Procedure: Left frontal CRANIOTOMY IMAGE-GUIDED FOR TUMOR;  Surgeon: Sigifredo Sagastume MD;  [facility];  Service: Neurosurgery    TONSILLECTOMY         FAMILY HISTORY:  Family History   Problem Relation Age of Onset    Atrial fibrillation Mother     Coronary artery disease Mother     Atrial fibrillation Father     Hypertension Father     Breast cancer Maternal Grandmother 60       SOCIAL HISTORY:  Social History     Tobacco Use    Smoking status: Former     Current packs/day: 0.00     Average packs/day: 1.5 packs/day for 30.0 years (45.1 ttl pk-yrs)     Types: Cigarettes     Start date: 6/15/1987     Quit date: 2008     Years since quittin.1     Passive exposure: Past    Smokeless tobacco: Never   Vaping Use    Vaping status: Never Used   Substance Use Topics    Alcohol use: Yes     Comment: social/anxiety    Drug use: Not Currently     Types: Marijuana     Comment: Previously       MEDICATIONS:    Current Outpatient Medications:     amLODIPine (NORVASC) 2.5 mg  tablet, TAKE 1 TABLET DAILY, Disp: 90 tablet, Rfl: 0    Ascorbic Acid (VITAMIN C) 100 MG tablet, Take 100 mg by mouth daily, Disp: , Rfl:     atorvastatin (LIPITOR) 10 mg tablet, TAKE 1 TABLET DAILY, Disp: 90 tablet, Rfl: 1    estradiol (ESTRACE) 0.1 mg/g vaginal cream, Insert into the vagina Takes weekly on Monday evenings, Disp: , Rfl:     fluticasone (FLONASE) 50 mcg/act nasal spray, 1 SPRAY INTO EACH NOSTRIL DAILY, Disp: 48 g, Rfl: 1    polyethylene glycol (MIRALAX) 17 g packet, Take 17 g by mouth daily as needed (constipation), Disp: , Rfl: 0    acetaminophen (TYLENOL) 325 mg tablet, Take 3 tablets (975 mg total) by mouth every 8 (eight) hours as needed for mild pain (Patient not taking: Reported on 2/28/2024), Disp: , Rfl: 0    ALLERGIES:  Allergies   Allergen Reactions    Sulfa Antibiotics Edema    Monascus Purpureus Went Yeast Other (See Comments)     Doesn't remember    Other Rash    Penicillins Rash       REVIEW OF SYSTEMS:  Review of Systems   Constitutional:  Negative for chills, fever and unexpected weight change.   HENT:  Negative for hearing loss, nosebleeds and sore throat.    Eyes:  Negative for pain, redness and visual disturbance.   Respiratory:  Negative for cough, shortness of breath and wheezing.    Cardiovascular:  Negative for chest pain, palpitations and leg swelling.   Gastrointestinal:  Negative for abdominal pain and nausea.   Genitourinary:  Negative for dyspareunia, dysuria and frequency.   Skin:  Negative for rash and wound.   Neurological:  Negative for dizziness, numbness and headaches.   Psychiatric/Behavioral:  Negative for decreased concentration and suicidal ideas. The patient is not nervous/anxious.        VITALS:  Vitals:    02/29/24 1145   BP: 127/85   Pulse: 59       LABS:  HgA1c:   Lab Results   Component Value Date    HGBA1C 5.7 (H) 10/18/2023     BMP:   Lab Results   Component Value Date    GLUCOSE 83 04/28/2017    CALCIUM 8.0 (L) 11/02/2023     04/28/2017    K 3.9  11/02/2023    CO2 24 11/02/2023     11/02/2023    BUN 8 11/02/2023    CREATININE 0.62 11/02/2023       _____________________________________________________  PHYSICAL EXAMINATION:  General: well developed and well nourished, alert, oriented times 3, and appears comfortable  Psychiatric: Normal  HEENT: Normocephalic, Atraumatic Trachea Midline, No torticollis  Pulmonary: No audible wheezing or respiratory distress   Cardiovascular: No pitting edema, 2+ radial pulse   Abdominal/GI: abdomen non tender, non distended   Skin: No masses, erythema, lacerations, fluctation, ulcerations  Neurovascular: Sensation Intact to the Median, Ulnar, Radial Nerve, Motor Intact to the Median, Ulnar, Radial Nerve, and Pulses Intact  Musculoskeletal: Normal, except as noted in detailed exam and in HPI.      MUSCULOSKELETAL EXAMINATION:  No change to her previous exam yesterday  Left wrist:  Well fit splint no skin abrasions  Can bring fingers into a fist  Swelling noted in fingers  Sensation intact to light touch distally  Brisk capillary refill   ___________________________________________________  STUDIES REVIEWED:  I have personally reviewed CT Left UE from 2/28/2024 which demonstrate IMPRESSION:  1. Comminuted impacted distal left radius fracture with slight dorsal angulation and dorsal displacement of distal fracture fragments as described.  2. Nondisplaced left trapezium fracture at its volar aspect.       PROCEDURES PERFORMED:  Procedures  No Procedures performed today    _____________________________________________________      Scribe Attestation      I,:  Linda Rao am acting as a scribe while in the presence of the attending physician.:       I,:  Man Durant MD personally performed the services described in this documentation    as scribed in my presence.:

## 2024-02-29 NOTE — DISCHARGE INSTR - AVS FIRST PAGE
Post Operative Instructions    You have had surgery on your arm today, please read and follow the information below:  Elevate your hand above your elbow during the next 24-48 hours to help with swelling.  Place your hand and arm over your head with motion at your shoulder three times a day.  Do not apply any cream/ointment/oil to your incisions including antibiotics.  Do not soak your hands in standing water (dishwater, tubs, Jacuzzi's, pools, etc.) until given permission (typically 2-3 weeks after injury)    Call the office if you notice any:  Increased numbness or tingling of your hand or fingers that is not relieved with elevation.  Increasing pain that is not controlled with medication.  Difficulty chewing, breathing, swallowing.  Chest pains or shortness of breath.  Fever over 101.4 degrees.    Bandage: Please keep bandages clean and dry. Your therapist will remove your bandage at your first therapy appointment. Once bandages are removed you may wash hands with soap and water. Short showers are okay as well, but please avoid soaking the hand as described above (ie no pools, baths, dirty dish water, hot tubs, ocean/lake water, etc). Sutures will be removed in the office at your first follow up visit, please do not remove them yourself.    Please do NOT put any topical agents on the surgical wound including neosporin, peroxide, tea tree oil, vitamin E, etc. as these can delay wound healing.    Motion: Move fingers into a fist 5 times a day, DO NOT move any splinted fingers.    Weight bearing status: The operated extremity should be non-weight bearing until further notice.    Ice: Ice for 10 minutes every hour as needed for swelling x 24 hours.    Sling: Please use your sling while your arm is numb from the block. When your arm is FULLY awake again, you no longer need this and may use your sling as needed for comfort. While using the sling, make sure to move your shoulder throughout the day to prevent stiffness  here.     Pain medication:   Naproxen 220 mg two times a day (this is over the counter!)  *do not take this medication if you were told by your doctor that you cannot take anti-inflammatories or NSAIDS  Tylenol Extended Release 650 mg every 8 hours (this is over the counter!)   Oxycodone one tab every 6 hours ONLY AS NEEDED for severe pain        Follow-up Appointment: 10-14 days with Dr Durant    Please call occupational therapy office to begin therapy 5 days after surgery. [facility] office number is [phone], other locations and contact info can be found online.    Please call the office if you have any questions or concerns regarding your post-operative care.

## 2024-02-29 NOTE — H&P
ASSESSMENT/PLAN:      68 year old female here for her left displaced distal radius fracture, 2/27/2024. The CT scan of the left UE was reviewed with the patient noting that at this time, we will plan to proceed with a volar plate, but if the bone is of poor quality, a dorsal wrist plate will be needed.   Discussed a with the risks in depth of an ORIF of the left distal radius with possible carpal tunnel release. This will be under regional with sedation At the [facility].  The regional block will last for approximately 16 hours and then her pain will be managed will take Ibuprofen 600mg and Tylenol with Oxycodone.   She will meet with Anaethesia to ensure she can endure the stress on her previous brain surgery. There is a risk in the future of EPL rupture from the tendon rubbing over the plate. And if the plate is too distal in order to get fixation, there may be a need to remove the plate due to FPL rupture risks. There is also a risk of developing carpal tunnel syndrome due to the nature of the fracture and the amount of swelling. She will be in outpatient OT within 3 days to work on motion.   Patient shows understanding and agrees with this plan of care. We will follow up post op.     The patient verbalized understanding of exam findings and treatment plan. We engaged in the shared decision-making process and treatment options were discussed at length with the patient. Surgical and conservative management discussed today along with risks and benefits.    Diagnoses and all orders for this visit:    Displaced fracture of distal end of left radius    Closed intra-articular die-punch fracture of left radius, initial encounter          Fracture Operative Treatment: The physiology of a fractured bone was discussed with the patient today.  With displaced fractures, operative treatment often results in a functional recovery.  Typically, these fractures undergo reduction either through percutaneous or open methods  depending on the location and fracture pattern.  Radiographs are typically taken at intervals throughout the fracture healing ensure maintenance of reduction and alignment.  If the fracture loses its alignment, revision surgery may be required.  Medical conditions such as diabetes, osteoporosis, vitamin D deficiency, and a history of or exposure to smoking may delay or prevent fracture healing.  The risks and benefits of the procedure were explained to the patient, which include, but are not limited to: Bleeding, infection, recurrence, pain, scar, malunion, nonunion, damage to tendons, damage to nerves, and damage to blood vessels, and complications related to anesthesia, failure to give desire result, need for more surgery.  These risks, along with alternative conservative treatment options, and postoperative protocols were voiced back and understood by the patient.  All questions were answered to the patient's satisfaction.  The patient agrees to comply with a standard postoperative protocol, and is willing to proceed.  Education was provided via written and auditory forms.  There were no barriers to learning.  Written handouts regarding wound care, incision and scar care, and general preoperative information was provided to the patient.  Prior to surgery, the patient may be requested to stop all anti-inflammatory medications.  Prophylactic aspirin, Plavix, and Coumadin may be allowed to be continued.  Medications including vitamin E., ginkgo, and fish oil are requested to be stopped approximately one week prior to surgery.  Hypertensive medications and beta blockers, if taken, should be continued.    Follow Up:  Return for Follow up post- op.      To Do Next Visit:  Re-evaluation of current issue    ____________________________________________________________________________________________________________________________________________      CHIEF COMPLAINT:  Chief Complaint   Patient presents with    Left Wrist  - Follow-up       SUBJECTIVE:  Maira Colby is a 68 y.o. year old RHD female who presents today to discuss a STAT CT scan of the left UE due to left displaced distal radius fracture, 2024. She was seen at urgent care same day after she had a fall onto the left wrist on the street. She was power walking. She is 4 months s/p left frontal craniotomy for meningioma x3, 2023 by Dr. Sagastume.         I have personally reviewed all the relevant PMH, PSH, SH, FH, Medications and allergies.     PAST MEDICAL HISTORY:  Past Medical History:   Diagnosis Date    Anxiety     Cellulitis     Chronic rhinitis     Last Assessed:2016    Colon polyp     GERD without esophagitis     Last Assessed:2016    Hyperlipidemia     Hypometabolism     Vitamin D deficiency        PAST SURGICAL HISTORY:  Past Surgical History:   Procedure Laterality Date    COLONOSCOPY      MI CRNEC TREPHINE BONE FLAP MENINGIOMA SUPRATENTOR Left 2023    Procedure: Left frontal CRANIOTOMY IMAGE-GUIDED FOR TUMOR;  Surgeon: Sigifredo Sagastume MD;  Location: [facility];  Service: Neurosurgery    TONSILLECTOMY         FAMILY HISTORY:  Family History   Problem Relation Age of Onset    Atrial fibrillation Mother     Coronary artery disease Mother     Atrial fibrillation Father     Hypertension Father     Breast cancer Maternal Grandmother 60       SOCIAL HISTORY:  Social History     Tobacco Use    Smoking status: Former     Current packs/day: 0.00     Average packs/day: 1.5 packs/day for 30.0 years (45.1 ttl pk-yrs)     Types: Cigarettes     Start date: 6/15/1987     Quit date: 2008     Years since quittin.1     Passive exposure: Past    Smokeless tobacco: Never   Vaping Use    Vaping status: Never Used   Substance Use Topics    Alcohol use: Yes     Comment: social/anxiety    Drug use: Not Currently     Types: Marijuana     Comment: Previously       MEDICATIONS:    Current Outpatient Medications:     amLODIPine (NORVASC) 2.5 mg  tablet, TAKE 1 TABLET DAILY, Disp: 90 tablet, Rfl: 0    Ascorbic Acid (VITAMIN C) 100 MG tablet, Take 100 mg by mouth daily, Disp: , Rfl:     atorvastatin (LIPITOR) 10 mg tablet, TAKE 1 TABLET DAILY, Disp: 90 tablet, Rfl: 1    estradiol (ESTRACE) 0.1 mg/g vaginal cream, Insert into the vagina Takes weekly on Monday evenings, Disp: , Rfl:     fluticasone (FLONASE) 50 mcg/act nasal spray, 1 SPRAY INTO EACH NOSTRIL DAILY, Disp: 48 g, Rfl: 1    polyethylene glycol (MIRALAX) 17 g packet, Take 17 g by mouth daily as needed (constipation), Disp: , Rfl: 0    acetaminophen (TYLENOL) 325 mg tablet, Take 3 tablets (975 mg total) by mouth every 8 (eight) hours as needed for mild pain (Patient not taking: Reported on 2/28/2024), Disp: , Rfl: 0    ALLERGIES:  Allergies   Allergen Reactions    Sulfa Antibiotics Edema    Monascus Purpureus Went Yeast Other (See Comments)     Doesn't remember    Other Rash    Penicillins Rash       REVIEW OF SYSTEMS:  Review of Systems   Constitutional:  Negative for chills, fever and unexpected weight change.   HENT:  Negative for hearing loss, nosebleeds and sore throat.    Eyes:  Negative for pain, redness and visual disturbance.   Respiratory:  Negative for cough, shortness of breath and wheezing.    Cardiovascular:  Negative for chest pain, palpitations and leg swelling.   Gastrointestinal:  Negative for abdominal pain and nausea.   Genitourinary:  Negative for dyspareunia, dysuria and frequency.   Skin:  Negative for rash and wound.   Neurological:  Negative for dizziness, numbness and headaches.   Psychiatric/Behavioral:  Negative for decreased concentration and suicidal ideas. The patient is not nervous/anxious.        VITALS:  Vitals:    02/29/24 1145   BP: 127/85   Pulse: 59       LABS:  HgA1c:   Lab Results   Component Value Date    HGBA1C 5.7 (H) 10/18/2023     BMP:   Lab Results   Component Value Date    GLUCOSE 83 04/28/2017    CALCIUM 8.0 (L) 11/02/2023     04/28/2017    K 3.9  11/02/2023    CO2 24 11/02/2023     11/02/2023    BUN 8 11/02/2023    CREATININE 0.62 11/02/2023       _____________________________________________________  PHYSICAL EXAMINATION:  General: well developed and well nourished, alert, oriented times 3, and appears comfortable  Psychiatric: Normal  HEENT: Normocephalic, Atraumatic Trachea Midline, No torticollis  Pulmonary: No audible wheezing or respiratory distress   Cardiovascular: No pitting edema, 2+ radial pulse   Abdominal/GI: abdomen non tender, non distended   Skin: No masses, erythema, lacerations, fluctation, ulcerations  Neurovascular: Sensation Intact to the Median, Ulnar, Radial Nerve, Motor Intact to the Median, Ulnar, Radial Nerve, and Pulses Intact  Musculoskeletal: Normal, except as noted in detailed exam and in HPI.      MUSCULOSKELETAL EXAMINATION:  No change to her previous exam yesterday  Left wrist:  Well fit splint no skin abrasions  Can bring fingers into a fist  Swelling noted in fingers  Sensation intact to light touch distally  Brisk capillary refill   ___________________________________________________  STUDIES REVIEWED:  I have personally reviewed CT Left UE from 2/28/2024 which demonstrate IMPRESSION:  1. Comminuted impacted distal left radius fracture with slight dorsal angulation and dorsal displacement of distal fracture fragments as described.  2. Nondisplaced left trapezium fracture at its volar aspect.       PROCEDURES PERFORMED:  Procedures  No Procedures performed today    _____________________________________________________      Scribe Attestation      I,:  Linda Rao am acting as a scribe while in the presence of the attending physician.:       I,:  Man Durant MD personally performed the services described in this documentation    as scribed in my presence.:

## 2024-02-29 NOTE — PROGRESS NOTES
ASSESSMENT/PLAN:      68 year old female here for her left displaced distal radius fracture, 2/27/2024. The CT scan of the left UE was reviewed with the patient noting that at this time, we will plan to proceed with a volar plate, but if the bone is of poor quality, a dorsal wrist plate will be needed.   Discussed a with the risks in depth of an ORIF of the left distal radius with possible carpal tunnel release. This will be under regional with sedation At the [facility].  The regional block will last for approximately 16 hours and then her pain will be managed will take Ibuprofen 600mg and Tylenol with Oxycodone.   She will meet with Anaethesia to ensure she can endure the stress on her previous brain surgery. There is a risk in the future of EPL rupture from the tendon rubbing over the plate. And if the plate is too distal in order to get fixation, there may be a need to remove the plate due to FPL rupture risks. There is also a risk of developing carpal tunnel syndrome due to the nature of the fracture and the amount of swelling. She will be in outpatient OT within 3 days to work on motion.   Patient shows understanding and agrees with this plan of care. We will follow up post op.     The patient verbalized understanding of exam findings and treatment plan. We engaged in the shared decision-making process and treatment options were discussed at length with the patient. Surgical and conservative management discussed today along with risks and benefits.    Diagnoses and all orders for this visit:    Displaced fracture of distal end of left radius    Closed intra-articular die-punch fracture of left radius, initial encounter          Fracture Operative Treatment: The physiology of a fractured bone was discussed with the patient today.  With displaced fractures, operative treatment often results in a functional recovery.  Typically, these fractures undergo reduction either through percutaneous or open methods  depending on the location and fracture pattern.  Radiographs are typically taken at intervals throughout the fracture healing ensure maintenance of reduction and alignment.  If the fracture loses its alignment, revision surgery may be required.  Medical conditions such as diabetes, osteoporosis, vitamin D deficiency, and a history of or exposure to smoking may delay or prevent fracture healing.  The risks and benefits of the procedure were explained to the patient, which include, but are not limited to: Bleeding, infection, recurrence, pain, scar, malunion, nonunion, damage to tendons, damage to nerves, and damage to blood vessels, and complications related to anesthesia, failure to give desire result, need for more surgery.  These risks, along with alternative conservative treatment options, and postoperative protocols were voiced back and understood by the patient.  All questions were answered to the patient's satisfaction.  The patient agrees to comply with a standard postoperative protocol, and is willing to proceed.  Education was provided via written and auditory forms.  There were no barriers to learning.  Written handouts regarding wound care, incision and scar care, and general preoperative information was provided to the patient.  Prior to surgery, the patient may be requested to stop all anti-inflammatory medications.  Prophylactic aspirin, Plavix, and Coumadin may be allowed to be continued.  Medications including vitamin E., ginkgo, and fish oil are requested to be stopped approximately one week prior to surgery.  Hypertensive medications and beta blockers, if taken, should be continued.    Follow Up:  Return for Follow up post- op.      To Do Next Visit:  Re-evaluation of current issue    ____________________________________________________________________________________________________________________________________________      CHIEF COMPLAINT:  Chief Complaint   Patient presents with    Left Wrist  - Follow-up       SUBJECTIVE:  Maira Colby is a 68 y.o. year old RHD female who presents today to discuss a STAT CT scan of the left UE due to left displaced distal radius fracture, 2024. She was seen at urgent care same day after she had a fall onto the left wrist on the street. She was power walking. She is 4 months s/p left frontal craniotomy for meningioma x3, 2023 by Dr. Sagastume.         I have personally reviewed all the relevant PMH, PSH, SH, FH, Medications and allergies.     PAST MEDICAL HISTORY:  Past Medical History:   Diagnosis Date    Anxiety     Cellulitis     Chronic rhinitis     Last Assessed:2016    Colon polyp     GERD without esophagitis     Last Assessed:2016    Hyperlipidemia     Hypometabolism     Vitamin D deficiency        PAST SURGICAL HISTORY:  Past Surgical History:   Procedure Laterality Date    COLONOSCOPY      [place] CRNEC TREPHINE BONE FLAP MENINGIOMA SUPRATENTOR Left 2023    Procedure: Left frontal CRANIOTOMY IMAGE-GUIDED FOR TUMOR;  Surgeon: Sigifredo Sagastume MD;  Location: [facility];  Service: Neurosurgery    TONSILLECTOMY         FAMILY HISTORY:  Family History   Problem Relation Age of Onset    Atrial fibrillation Mother     Coronary artery disease Mother     Atrial fibrillation Father     Hypertension Father     Breast cancer Maternal Grandmother 60       SOCIAL HISTORY:  Social History     Tobacco Use    Smoking status: Former     Current packs/day: 0.00     Average packs/day: 1.5 packs/day for 30.0 years (45.1 ttl pk-yrs)     Types: Cigarettes     Start date: 6/15/1987     Quit date: 2008     Years since quittin.1     Passive exposure: Past    Smokeless tobacco: Never   Vaping Use    Vaping status: Never Used   Substance Use Topics    Alcohol use: Yes     Comment: social/anxiety    Drug use: Not Currently     Types: Marijuana     Comment: Previously       MEDICATIONS:    Current Outpatient Medications:     amLODIPine (NORVASC) 2.5 mg  tablet, TAKE 1 TABLET DAILY, Disp: 90 tablet, Rfl: 0    Ascorbic Acid (VITAMIN C) 100 MG tablet, Take 100 mg by mouth daily, Disp: , Rfl:     atorvastatin (LIPITOR) 10 mg tablet, TAKE 1 TABLET DAILY, Disp: 90 tablet, Rfl: 1    estradiol (ESTRACE) 0.1 mg/g vaginal cream, Insert into the vagina Takes weekly on Monday evenings, Disp: , Rfl:     fluticasone (FLONASE) 50 mcg/act nasal spray, 1 SPRAY INTO EACH NOSTRIL DAILY, Disp: 48 g, Rfl: 1    polyethylene glycol (MIRALAX) 17 g packet, Take 17 g by mouth daily as needed (constipation), Disp: , Rfl: 0    acetaminophen (TYLENOL) 325 mg tablet, Take 3 tablets (975 mg total) by mouth every 8 (eight) hours as needed for mild pain (Patient not taking: Reported on 2/28/2024), Disp: , Rfl: 0    ALLERGIES:  Allergies   Allergen Reactions    Sulfa Antibiotics Edema    Monascus Purpureus Went Yeast Other (See Comments)     Doesn't remember    Other Rash    Penicillins Rash       REVIEW OF SYSTEMS:  Review of Systems   Constitutional:  Negative for chills, fever and unexpected weight change.   HENT:  Negative for hearing loss, nosebleeds and sore throat.    Eyes:  Negative for pain, redness and visual disturbance.   Respiratory:  Negative for cough, shortness of breath and wheezing.    Cardiovascular:  Negative for chest pain, palpitations and leg swelling.   Gastrointestinal:  Negative for abdominal pain and nausea.   Genitourinary:  Negative for dyspareunia, dysuria and frequency.   Skin:  Negative for rash and wound.   Neurological:  Negative for dizziness, numbness and headaches.   Psychiatric/Behavioral:  Negative for decreased concentration and suicidal ideas. The patient is not nervous/anxious.        VITALS:  Vitals:    02/29/24 1145   BP: 127/85   Pulse: 59       LABS:  HgA1c:   Lab Results   Component Value Date    HGBA1C 5.7 (H) 10/18/2023     BMP:   Lab Results   Component Value Date    GLUCOSE 83 04/28/2017    CALCIUM 8.0 (L) 11/02/2023     04/28/2017    K 3.9  11/02/2023    CO2 24 11/02/2023     11/02/2023    BUN 8 11/02/2023    CREATININE 0.62 11/02/2023       _____________________________________________________  PHYSICAL EXAMINATION:  General: well developed and well nourished, alert, oriented times 3, and appears comfortable  Psychiatric: Normal  HEENT: Normocephalic, Atraumatic Trachea Midline, No torticollis  Pulmonary: No audible wheezing or respiratory distress   Cardiovascular: No pitting edema, 2+ radial pulse   Abdominal/GI: abdomen non tender, non distended   Skin: No masses, erythema, lacerations, fluctation, ulcerations  Neurovascular: Sensation Intact to the Median, Ulnar, Radial Nerve, Motor Intact to the Median, Ulnar, Radial Nerve, and Pulses Intact  Musculoskeletal: Normal, except as noted in detailed exam and in HPI.      MUSCULOSKELETAL EXAMINATION:  No change to her previous exam yesterday  Left wrist:  Well fit splint no skin abrasions  Can bring fingers into a fist  Swelling noted in fingers  Sensation intact to light touch distally  Brisk capillary refill   ___________________________________________________  STUDIES REVIEWED:  I have personally reviewed CT Left UE from 2/28/2024 which demonstrate IMPRESSION:  1. Comminuted impacted distal left radius fracture with slight dorsal angulation and dorsal displacement of distal fracture fragments as described.  2. Nondisplaced left trapezium fracture at its volar aspect.       PROCEDURES PERFORMED:  Procedures  No Procedures performed today    _____________________________________________________      Scribe Attestation      I,:  Linda Rao am acting as a scribe while in the presence of the attending physician.:       I,:  Man Durant MD personally performed the services described in this documentation    as scribed in my presence.:

## 2024-03-01 ENCOUNTER — APPOINTMENT (OUTPATIENT)
Age: 69
End: 2024-03-01
Payer: COMMERCIAL

## 2024-03-01 ENCOUNTER — HOSPITAL ENCOUNTER (OUTPATIENT)
Age: 69
Setting detail: OUTPATIENT SURGERY
Discharge: HOME | End: 2024-03-01
Attending: SURGERY | Admitting: SURGERY
Payer: COMMERCIAL

## 2024-03-01 ENCOUNTER — ANESTHESIA (OUTPATIENT)
Age: 69
End: 2024-03-01
Payer: COMMERCIAL

## 2024-03-01 VITALS
HEART RATE: 73 BPM | RESPIRATION RATE: 16 BRPM | WEIGHT: 181 LBS | SYSTOLIC BLOOD PRESSURE: 129 MMHG | HEIGHT: 64 IN | BODY MASS INDEX: 30.9 KG/M2 | OXYGEN SATURATION: 96 % | TEMPERATURE: 98.2 F | DIASTOLIC BLOOD PRESSURE: 64 MMHG

## 2024-03-01 DIAGNOSIS — S52.572D CLOSED INTRA-ARTICULAR DIE-PUNCH FRACTURE OF LEFT RADIUS WITH ROUTINE HEALING, SUBSEQUENT ENCOUNTER: Primary | ICD-10-CM

## 2024-03-01 DIAGNOSIS — Z48.89 AFTERCARE FOLLOWING SURGERY: ICD-10-CM

## 2024-03-01 PROCEDURE — 64721 CARPAL TUNNEL SURGERY: CPT | Performed by: SURGERY

## 2024-03-01 PROCEDURE — C1713 ANCHOR/SCREW BN/BN,TIS/BN: HCPCS | Performed by: SURGERY

## 2024-03-01 PROCEDURE — 64721 CARPAL TUNNEL SURGERY: CPT | Performed by: PHYSICIAN ASSISTANT

## 2024-03-01 PROCEDURE — 25609 OPTX DST RD XART FX/EP SEP3+: CPT | Performed by: PHYSICIAN ASSISTANT

## 2024-03-01 PROCEDURE — C9290 INJ, BUPIVACAINE LIPOSOME: HCPCS | Performed by: ANESTHESIOLOGY

## 2024-03-01 PROCEDURE — 25609 OPTX DST RD XART FX/EP SEP3+: CPT | Performed by: SURGERY

## 2024-03-01 PROCEDURE — 73100 X-RAY EXAM OF WRIST: CPT

## 2024-03-01 DEVICE — PEG VOLAR THREADED 16MM: Type: IMPLANTABLE DEVICE | Site: WRIST | Status: FUNCTIONAL

## 2024-03-01 DEVICE — PEG VOLAR THREADED 20MM: Type: IMPLANTABLE DEVICE | Site: WRIST | Status: FUNCTIONAL

## 2024-03-01 DEVICE — PEG VOLAR 18MM UNTHREADED: Type: IMPLANTABLE DEVICE | Site: WRIST | Status: FUNCTIONAL

## 2024-03-01 DEVICE — PEG VOLAR 20MM UNTHREADED: Type: IMPLANTABLE DEVICE | Site: WRIST | Status: FUNCTIONAL

## 2024-03-01 DEVICE — SCREW LCK 3.2 X 12MM CORTICAL: Type: IMPLANTABLE DEVICE | Site: WRIST | Status: FUNCTIONAL

## 2024-03-01 DEVICE — PEG VOLAR 16MM UNTHREADED: Type: IMPLANTABLE DEVICE | Site: WRIST | Status: FUNCTIONAL

## 2024-03-01 DEVICE — SCREW LCK 3.2 X 10MM CORTICAL: Type: IMPLANTABLE DEVICE | Site: WRIST | Status: FUNCTIONAL

## 2024-03-01 DEVICE — SCREW CORTICAL 3.2 X 12MM: Type: IMPLANTABLE DEVICE | Site: WRIST | Status: FUNCTIONAL

## 2024-03-01 DEVICE — PEG VOLAR THREADED 18MM: Type: IMPLANTABLE DEVICE | Site: WRIST | Status: FUNCTIONAL

## 2024-03-01 DEVICE — PLATE VOLAR BEARING 5 HOLE LEFT: Type: IMPLANTABLE DEVICE | Site: WRIST | Status: FUNCTIONAL

## 2024-03-01 RX ORDER — FENTANYL CITRATE 50 UG/ML
INJECTION, SOLUTION INTRAMUSCULAR; INTRAVENOUS
Status: COMPLETED | OUTPATIENT
Start: 2024-03-01 | End: 2024-03-01

## 2024-03-01 RX ORDER — DEXAMETHASONE SODIUM PHOSPHATE 10 MG/ML
INJECTION, SOLUTION INTRAMUSCULAR; INTRAVENOUS AS NEEDED
Status: DISCONTINUED | OUTPATIENT
Start: 2024-03-01 | End: 2024-03-01

## 2024-03-01 RX ORDER — ONDANSETRON 2 MG/ML
4 INJECTION INTRAMUSCULAR; INTRAVENOUS ONCE AS NEEDED
Status: DISCONTINUED | OUTPATIENT
Start: 2024-03-01 | End: 2024-03-01 | Stop reason: HOSPADM

## 2024-03-01 RX ORDER — FENTANYL CITRATE/PF 50 MCG/ML
25 SYRINGE (ML) INJECTION
Status: DISCONTINUED | OUTPATIENT
Start: 2024-03-01 | End: 2024-03-01 | Stop reason: HOSPADM

## 2024-03-01 RX ORDER — ONDANSETRON 2 MG/ML
INJECTION INTRAMUSCULAR; INTRAVENOUS AS NEEDED
Status: DISCONTINUED | OUTPATIENT
Start: 2024-03-01 | End: 2024-03-01

## 2024-03-01 RX ORDER — CLINDAMYCIN PHOSPHATE 900 MG/50ML
900 INJECTION, SOLUTION INTRAVENOUS ONCE
Status: DISCONTINUED | OUTPATIENT
Start: 2024-03-01 | End: 2024-03-01

## 2024-03-01 RX ORDER — HYDROCODONE BITARTRATE AND ACETAMINOPHEN 5; 325 MG/1; MG/1
1 TABLET ORAL EVERY 6 HOURS PRN
Status: DISCONTINUED | OUTPATIENT
Start: 2024-03-01 | End: 2024-03-01 | Stop reason: HOSPADM

## 2024-03-01 RX ORDER — PROPOFOL 10 MG/ML
INJECTION, EMULSION INTRAVENOUS AS NEEDED
Status: DISCONTINUED | OUTPATIENT
Start: 2024-03-01 | End: 2024-03-01

## 2024-03-01 RX ORDER — PROMETHAZINE HYDROCHLORIDE 25 MG/ML
12.5 INJECTION, SOLUTION INTRAMUSCULAR; INTRAVENOUS ONCE AS NEEDED
Status: DISCONTINUED | OUTPATIENT
Start: 2024-03-01 | End: 2024-03-01 | Stop reason: HOSPADM

## 2024-03-01 RX ORDER — BUPIVACAINE HYDROCHLORIDE 5 MG/ML
INJECTION, SOLUTION EPIDURAL; INTRACAUDAL
Status: COMPLETED | OUTPATIENT
Start: 2024-03-01 | End: 2024-03-01

## 2024-03-01 RX ORDER — CEFAZOLIN SODIUM 2 G/50ML
2000 SOLUTION INTRAVENOUS ONCE
Status: COMPLETED | OUTPATIENT
Start: 2024-03-01 | End: 2024-03-01

## 2024-03-01 RX ORDER — LIDOCAINE HYDROCHLORIDE 10 MG/ML
INJECTION, SOLUTION EPIDURAL; INFILTRATION; INTRACAUDAL; PERINEURAL AS NEEDED
Status: DISCONTINUED | OUTPATIENT
Start: 2024-03-01 | End: 2024-03-01

## 2024-03-01 RX ORDER — CHLORHEXIDINE GLUCONATE ORAL RINSE 1.2 MG/ML
15 SOLUTION DENTAL ONCE
Status: COMPLETED | OUTPATIENT
Start: 2024-03-01 | End: 2024-03-01

## 2024-03-01 RX ORDER — EPHEDRINE SULFATE 50 MG/ML
INJECTION INTRAVENOUS AS NEEDED
Status: DISCONTINUED | OUTPATIENT
Start: 2024-03-01 | End: 2024-03-01

## 2024-03-01 RX ORDER — MIDAZOLAM HYDROCHLORIDE 2 MG/2ML
INJECTION, SOLUTION INTRAMUSCULAR; INTRAVENOUS
Status: COMPLETED | OUTPATIENT
Start: 2024-03-01 | End: 2024-03-01

## 2024-03-01 RX ORDER — HYDROCODONE BITARTRATE AND ACETAMINOPHEN 5; 325 MG/1; MG/1
1 TABLET ORAL EVERY 6 HOURS PRN
Qty: 20 TABLET | Refills: 0 | Status: SHIPPED | OUTPATIENT
Start: 2024-03-01 | End: 2024-03-11

## 2024-03-01 RX ORDER — SODIUM CHLORIDE 9 MG/ML
100 INJECTION, SOLUTION INTRAVENOUS CONTINUOUS
Status: DISCONTINUED | OUTPATIENT
Start: 2024-03-01 | End: 2024-03-01 | Stop reason: HOSPADM

## 2024-03-01 RX ADMIN — SODIUM CHLORIDE 100 ML/HR: 0.9 INJECTION, SOLUTION INTRAVENOUS at 10:35

## 2024-03-01 RX ADMIN — PROPOFOL 80 MCG/KG/MIN: 10 INJECTION, EMULSION INTRAVENOUS at 12:07

## 2024-03-01 RX ADMIN — EPHEDRINE SULFATE 5 MG: 50 INJECTION INTRAVENOUS at 12:32

## 2024-03-01 RX ADMIN — CHLORHEXIDINE GLUCONATE 0.12% ORAL RINSE 15 ML: 1.2 LIQUID ORAL at 10:22

## 2024-03-01 RX ADMIN — MIDAZOLAM 0.5 MG: 1 INJECTION INTRAMUSCULAR; INTRAVENOUS at 11:01

## 2024-03-01 RX ADMIN — PROPOFOL 30 MG: 10 INJECTION, EMULSION INTRAVENOUS at 12:06

## 2024-03-01 RX ADMIN — CEFAZOLIN SODIUM 2000 MG: 2 SOLUTION INTRAVENOUS at 12:01

## 2024-03-01 RX ADMIN — ONDANSETRON 4 MG: 2 INJECTION INTRAMUSCULAR; INTRAVENOUS at 12:08

## 2024-03-01 RX ADMIN — BUPIVACAINE 20 ML: 13.3 INJECTION, SUSPENSION, LIPOSOMAL INFILTRATION at 11:03

## 2024-03-01 RX ADMIN — FENTANYL CITRATE 50 MCG: 50 INJECTION INTRAMUSCULAR; INTRAVENOUS at 11:01

## 2024-03-01 RX ADMIN — DEXAMETHASONE SODIUM PHOSPHATE 10 MG: 10 INJECTION, SOLUTION INTRAMUSCULAR; INTRAVENOUS at 12:09

## 2024-03-01 RX ADMIN — LIDOCAINE HYDROCHLORIDE 50 MG: 10 INJECTION, SOLUTION EPIDURAL; INFILTRATION; INTRACAUDAL; PERINEURAL at 12:06

## 2024-03-01 RX ADMIN — BUPIVACAINE HYDROCHLORIDE 10 ML: 5 INJECTION, SOLUTION EPIDURAL; INTRACAUDAL; PERINEURAL at 11:03

## 2024-03-01 NOTE — ANESTHESIA PROCEDURE NOTES
Peripheral Block    Patient location during procedure: holding area  Start time: 3/1/2024 11:03 AM  Reason for block: at surgeon's request and post-op pain management  Staffing  Performed by: Valentin Barnett MD  Authorized by: Valentin Barnett MD    Preanesthetic Checklist  Completed: patient identified, IV checked, site marked, risks and benefits discussed, surgical consent, monitors and equipment checked, pre-op evaluation and timeout performed  Peripheral Block  Patient position: sitting  Prep: ChloraPrep  Patient monitoring: frequent blood pressure checks, continuous pulse oximetry and heart rate  Block type: Supraclavicular  Laterality: left  Injection technique: single-shot  Procedures: ultrasound guided, Ultrasound guidance required for the procedure to increase accuracy and safety of medication placement and decrease risk of complications.  Ultrasound permanent image savedbupivacaine (PF) (MARCAINE) 0.5 % injection 20 mL - Perineural   10 mL - 3/1/2024 11:03:00 AM  bupivacaine liposomal (EXPAREL) 1.3 % injection 20 mL - Perineural   20 mL - 3/1/2024 11:03:00 AM  fentanyl citrate (PF) 100 MCG/2ML 50 mcg - Intravenous   50 mcg - 3/1/2024 11:01:00 AM  midazolam (VERSED) injection 0.5 mg - Intravenous   0.5 mg - 3/1/2024 11:01:00 AM  Needle  Needle type: Stimuplex   Needle gauge: 20 G  Needle length: 4 in  Needle localization: anatomical landmarks and ultrasound guidance  Assessment  Injection assessment: incremental injection, frequent aspiration, injected with ease, negative aspiration, negative for heart rate change, no paresthesia on injection, no symptoms of intraneural/intravenous injection and needle tip visualized at all times  Paresthesia pain: none  Post-procedure:  site cleaned  patient tolerated the procedure well with no immediate complications

## 2024-03-01 NOTE — OP NOTE
OPERATIVE REPORT  PATIENT NAME: Maira Colby  :  1955  MRN: [id number]  Pt Location: [facility]    SURGERY DATE: 24    Surgeons and Role:     * Man Durant MD - Primary     * Syeda Snell PA-C - Assisting    Pre-Op Diagnosis:  Closed intra-articular die-punch fracture of left radius, initial encounter [S52.577V]    Post-Op Diagnosis Codes:     * Closed intra-articular die-punch fracture of left radius, initial encounter [S52.573U]    Procedure(s):  OPEN REDUCTION W/ INTERNAL FIXATION (ORIF) RADIUS / ULNA (WRIST) (Left)  RELEASE CARPAL TUNNEL (Left)    Specimen(s):  No specimens collected during this procedure.    Estimated Blood Loss:   Minimal    Anesthesia Type:   Regional with Sedation    IMPLANTS:  Implant Name Type Inv. Item Serial No.  Lot No. LRB No. Used Action   LOG [id number] - TRIMED WRIST FIXATION SYSTEM - 1           PLATE VOLAR BEARING 5 HOLE LEFT - [id number]  PLATE VOLAR BEARING 5 HOLE LEFT  [employer]  Left 1 Implanted   K-WIRE 1.1 X 100MM SS - [id number]  K-WIRE 1.1 X 100MM SS  [employer]  Left 3 Implanted and Explanted   SCREW CORTICAL 3.2 X 16MM - [id number]  SCREW CORTICAL 3.2 X 16MM  [employer]  Left 1 Implanted and Explanted   SCREW CORTICAL 2.3 X 24MM - [id number]  SCREW CORTICAL 2.3 X 24MM  [employer]  Left 1 Implanted and Explanted   PEG VOLAR THREADED 16MM - [id number]  PEG VOLAR THREADED 16MM  [employer]  Left 2 Implanted   PEG VOLAR THREADED 18MM - [id number]  PEG VOLAR THREADED 18MM  [employer]  Left 1 Implanted   PEG VOLAR THREADED 20MM - [id number]  PEG VOLAR THREADED 20MM  [employer]  Left 2 Implanted   PEG VOLAR 18MM UNTHREADED - [id number]  PEG VOLAR 18MM UNTHREADED  [employer]  Left 2 Implanted   PEG VOLAR 20MM UNTHREADED - [id number]  PEG VOLAR 20MM UNTHREADED  [employer]  Left 1 Implanted   SCREW CORTICAL 3.2 X 12MM - [id number]  SCREW CORTICAL 3.2 X 12MM  [employer]  Left 1 Implanted   PEG VOLAR THREADED 18MM - [id number]  PEG VOLAR THREADED 18MM   [employer]  Left 1 Implanted and Explanted   SCREW LCK 3.2 X 10MM CORTICAL - [id number]  SCREW LCK 3.2 X 10MM CORTICAL  [employer]  Left 2 Implanted   SCREW LCK 3.2 X 12MM CORTICAL - [id number]  SCREW LCK 3.2 X 12MM CORTICAL  [employer]  Left 2 Implanted   PEG VOLAR 16MM UNTHREADED - [id number]  PEG VOLAR 16MM UNTHREADED  [employer]  Left 1 Implanted       PERIOPERATIVE ANTIBIOTICS:    cefazolin, 2 grams    Tourniquet Time: 72 min  at 250 mmHg          Operative Indications:  The patient has a history of left distal radius fracture, intra articular > 3 parts with central depression that was recalcitrant to conservative management.  The decision was made to bring the patient to the operating room for left distal radius fracture greater than 3 parts intra-articular open reduction internal fixation, left carpal tunnel release risks of the procedure were explained which include, but are not limited to bleeding; infection; damage to nerves, arteries,veins, tendons; scar; pain; need for reoperation; failure to give desired result; and risks of anaesthesia.  All questions were answered to satisfaction and they were willing to proceed.         Operative Findings:  Intra articular distal radius  fracture with central depression  Hematoma within the carpal tunnel    Mild hypertrophy TCL   Complications:   None    Procedure and Technique:  After the patient, site, and procedure were identified, the patient was brought into the operating room in a supine position.  Regional anaesthesia and sedation were provided.  A well padded tourniquet was applied to the extremity, set at 250 mmHg.  The  left upper extremity was then prepped and drapped in a normal, sterile, orthopedic fashion.   A linear 10 cm incision was made overlying the FCR with chevron extension at the distal wrist crease Under loupe magnification the FCR sheath was incised, being careful to avoid the palmar cutaneous branch of the median nerve. The FCR was retracted  medially and the sub sheath incised . The FPL was retracted medially. The PQ was detached using a hockey stick incision.The remainder was sharply elevated off the distal radius.  The radial styloid was exposed and the brachioradialis released. Care was taken to preserve the tendons within the first dorsal compartment. The fracture site was curettage. Central  comminution / depression was noted at the intra articular fracture site. The fracture configuration appear to be a  greater than three-part intraarticular     The  Fracture was reduced . Reduction was verified on orthogonal fluoroscopic views.  The 5 hole  plate was selected and provisionally pinned in place. Position was verified on orthogonal fluoroscopic views. The place was secured distally with 1 cortical screw, elevating the proximal portion of the plate off of the shaft.   Next the radial styloid screw and ulnar most distal row screw were drilled measured and filled with appropriately sized locking screws.  The central depression was raised through the plate by first drilling a hole and elevating the fracture fragment this was then secured with a K wire  Next the cortical screws were exchanged for locking screws.   The oblong hole screw was drilled measured and filled with appropriate size screw restoring volar tilt.   Additional radial height and inclination were gained using distraction all and radial translation.   An additional 2 locking screws were placed within the 2nd distal row.     Finally 3 more shaft screws were drilled measured and appropriately placed, using combination of locking and nonlocking screws.       Fluoroscopic images were obtained in orthogonal planes demonstrating good fracture reduction and left fluoroscopy utilized to ensure there is no intra-articular penetration of the screws into the radiocarpal joint or the DRUJ.   The DRUJ was tested supination pronation and neutral and found to be stable     An anterior approach to the  carpal tunnel was undertaken. A 2 cm incision was made in line with the fourth digit, proximal to Lopez's line.  The skin and the subcutaneous tissue were sharply incised.  Under loupe magnification, dissection was carried down to the palmar fascia and it was incised.  A hematoma was noted within this layer;  the transverse carpal ligament was visualized and  Released distally with a #64 blade. A freer was was passed above and below the TCL in a retrograde fashion, freeing up any adhesions. A Knife Lite was used to release the proximal portion of the transverse carpal ligament under direct visualization.  Distally the superficial arch was identified.  A complete release was carried out and exploration of the carpal canal revealed no significant tenosynovitis. The median nerve and its branches were intact.    .       At the completion of the procedure, hemostasis was obtained with cautery and direct pressure.  The wounds were copiously irrigated with sterile solution.  The wounds were closed with Prolene, Monocryl, and Steri-strips.  Sterile dressings were applied, including Xeroform, gauze, tweeners, webril, ACE, Volar Splint, and Sling.  Please note, all sponge, needle, and instrument counts were correct prior to closure.  Loupe magnification was utilized.  The patient tolerated the procedure well.     I was present for the entire procedure., A qualified resident physician was not available., and A physician assistant was required during the procedure for retraction, tissue handling, dissection and suturing.    Patient Disposition:  PACU     SIGNATURE: Man Durant MD  DATE: 03/01/24  TIME: 1:27 PM

## 2024-03-01 NOTE — ANESTHESIA PREPROCEDURE EVALUATION
Procedure:  OPEN REDUCTION W/ INTERNAL FIXATION (ORIF) RADIUS / ULNA (WRIST) (Left: Wrist)  RELEASE CARPAL TUNNEL (Left: Wrist)    Relevant Problems   CARDIO   (+) Mixed hyperlipidemia   (+) Primary hypertension      GI/HEPATIC   (+) GERD (gastroesophageal reflux disease)      MUSCULOSKELETAL   (+) Lateral epicondylitis of left elbow      Musculoskeletal and Integument   (+) Closed die-punch intra-articular fracture of distal end of left radius      BMI 31    Lab Results   Component Value Date    WBC 10.20 (H) 11/02/2023    HGB 12.0 11/02/2023    HCT 34.2 (L) 11/02/2023    MCV 94 11/02/2023     11/02/2023     Lab Results   Component Value Date     04/28/2017    K 3.9 02/29/2024    CO2 27 02/29/2024     02/29/2024    BUN 10 02/29/2024    CREATININE 0.61 02/29/2024     Lab Results   Component Value Date    INR 1.32 (H) 11/02/2023    INR 1.1 10/18/2023    PROTIME 16.2 (H) 11/02/2023    PROTIME 11.9 10/18/2023     Lab Results   Component Value Date    PTT 30 11/02/2023       Lab Results   Component Value Date    GLUCOSE 83 04/28/2017    GLUCOSE 96 05/20/2016       Lab Results   Component Value Date    HGBA1C 5.7 (H) 10/18/2023       Type and Screen:  O    Physical Exam    Airway    Mallampati score: III  TM Distance: >3 FB  Neck ROM: full     Dental       Cardiovascular      Pulmonary      Other Findings  post-pubertal.      Anesthesia Plan  ASA Score- 2     Anesthesia Type- regional with ASA Monitors.         Additional Monitors:     Airway Plan:     Comment: Discussed supraclavicular nerve block as primary anesthetic and for postoperative pain control with risks/benefits/alternatives. Patient made aware of possible postoperative shortness of breath related to transient hemidiaphragmatic paralysis. Discussed extremely low likelihood of transient or permanent nerve injury in the setting of ultrasound guidance and patient participation. Patient aware and would like to proceed.    .       Plan  Factors-    Induction-     Postoperative Plan-     Informed Consent-

## 2024-03-01 NOTE — ANESTHESIA POSTPROCEDURE EVALUATION
Post-Op Assessment Note    CV Status:  Stable  Pain Score: 0    Pain management: adequate       Mental Status:  Alert and awake   Hydration Status:  Euvolemic   PONV Controlled:  Controlled   Airway Patency:  Patent     Post Op Vitals Reviewed: Yes    No anethesia notable event occurred.    Staff: CRNA               /65 (03/01/24 1347)    Temp 98.2 °F (36.8 °C) (03/01/24 1347)    Pulse 69 (03/01/24 1347)   Resp 12 (03/01/24 1347)    SpO2 94 % (03/01/24 1347)

## 2024-03-05 ENCOUNTER — EVALUATION (OUTPATIENT)
Dept: OCCUPATIONAL THERAPY | Facility: CLINIC | Age: 69
End: 2024-03-05
Payer: COMMERCIAL

## 2024-03-05 DIAGNOSIS — S52.572D CLOSED INTRA-ARTICULAR DIE-PUNCH FRACTURE OF LEFT RADIUS WITH ROUTINE HEALING, SUBSEQUENT ENCOUNTER: Primary | ICD-10-CM

## 2024-03-05 DIAGNOSIS — Z48.89 AFTERCARE FOLLOWING SURGERY: ICD-10-CM

## 2024-03-05 DIAGNOSIS — G56.02 LEFT CARPAL TUNNEL SYNDROME: ICD-10-CM

## 2024-03-05 PROCEDURE — 97166 OT EVAL MOD COMPLEX 45 MIN: CPT

## 2024-03-07 ENCOUNTER — OFFICE VISIT (OUTPATIENT)
Dept: OCCUPATIONAL THERAPY | Facility: CLINIC | Age: 69
End: 2024-03-07
Payer: COMMERCIAL

## 2024-03-07 DIAGNOSIS — Z48.89 AFTERCARE FOLLOWING SURGERY: ICD-10-CM

## 2024-03-07 DIAGNOSIS — S52.572D CLOSED INTRA-ARTICULAR DIE-PUNCH FRACTURE OF LEFT RADIUS WITH ROUTINE HEALING, SUBSEQUENT ENCOUNTER: Primary | ICD-10-CM

## 2024-03-07 DIAGNOSIS — J32.9 CHRONIC SINUSITIS, UNSPECIFIED LOCATION: ICD-10-CM

## 2024-03-07 DIAGNOSIS — G56.02 LEFT CARPAL TUNNEL SYNDROME: ICD-10-CM

## 2024-03-07 PROCEDURE — 97110 THERAPEUTIC EXERCISES: CPT

## 2024-03-07 PROCEDURE — 97530 THERAPEUTIC ACTIVITIES: CPT

## 2024-03-07 RX ORDER — FLUTICASONE PROPIONATE 50 MCG
1 SPRAY, SUSPENSION (ML) NASAL DAILY
Qty: 48 G | Refills: 1 | Status: SHIPPED | OUTPATIENT
Start: 2024-03-07

## 2024-03-07 NOTE — TELEPHONE ENCOUNTER
Reason for call:   [x] Refill   [] Prior Auth  [] Other:     Office:   [x] PCP/Provider - [facility]  [] Specialty/Provider -     Medication: Flonase    Dose/Frequency: 50 mcg/ QD    Quantity: 48 g    Pharmacy: [facility]    Does the patient have enough for 3 days?   [] Yes   [x] No - Send as HP to POD

## 2024-03-07 NOTE — PROGRESS NOTES
Daily Note     Today's date: 3/7/2024  Patient name: Maira Colby  : 1955  MRN: [id number]  Referring provider: Syeda Snell PA-C  Dx:   Encounter Diagnosis     ICD-10-CM    1. Closed intra-articular die-punch fracture of left radius with routine healing, subsequent encounter  S52.572D       2. Aftercare following surgery  Z48.89       3. Left carpal tunnel syndrome  G56.02                      Subjective: Patient notes the hand is moving much better.       Objective: See treatment diary below       Auth Tracker  Auth Status Total   Visits  Expiration date Co-Insurance   pending                                  Visit Tracker  Date 3/5 3/7                                                                                     PMHx:   has a past medical history of Anxiety, Cellulitis, Chronic rhinitis, Colon polyp, GERD without esophagitis, Hyperlipidemia, Hypometabolism, and Vitamin D deficiency.    Precautions:        Manuals HEP 3/7/2024                        Ther Ex     Education on HEP and dx  x5min   AROM tendon glides x x10   AROM table top extension x x10   AROM digit add/abduction x x10   AROM elbow flex/ext  x5   AROM wrist flex/ext/RD/UD  x10   Wrist cock up splint  X5 min    AROM forearm rotation  x10        Ther Act      Towel scrunch   x5   Dice IHM  x10        Modalities     MHP  5 min           Assessment:   Patient tolerated session well. Session focused on ROM and strengthening to improve deficits and functional performance with daily activities. Incision continues to look CDI at this time. Patient tolerated all TE/TA with no complaints. Patient progressing well towards goals. Patient benefiting from skilled hand therapy OT to reduce deficits to improve independence with daily activities.        Plan:   Focus on ROM to improve ability to complete daily activites with ease.  POC 3/5/24 - 24

## 2024-03-12 ENCOUNTER — OFFICE VISIT (OUTPATIENT)
Dept: OCCUPATIONAL THERAPY | Facility: CLINIC | Age: 69
End: 2024-03-12
Payer: COMMERCIAL

## 2024-03-12 DIAGNOSIS — Z48.89 AFTERCARE FOLLOWING SURGERY: ICD-10-CM

## 2024-03-12 DIAGNOSIS — G56.02 LEFT CARPAL TUNNEL SYNDROME: ICD-10-CM

## 2024-03-12 DIAGNOSIS — S52.572D CLOSED INTRA-ARTICULAR DIE-PUNCH FRACTURE OF LEFT RADIUS WITH ROUTINE HEALING, SUBSEQUENT ENCOUNTER: Primary | ICD-10-CM

## 2024-03-12 PROCEDURE — 97110 THERAPEUTIC EXERCISES: CPT

## 2024-03-12 PROCEDURE — 97530 THERAPEUTIC ACTIVITIES: CPT

## 2024-03-12 NOTE — PROGRESS NOTES
Daily Note     Today's date: 3/12/2024  Patient name: Maira Colby  : 1955  MRN: [id number]  Referring provider: Syeda Snell PA-C  Dx:   Encounter Diagnosis     ICD-10-CM    1. Closed intra-articular die-punch fracture of left radius with routine healing, subsequent encounter  S52.572D       2. Left carpal tunnel syndrome  G56.02       3. Aftercare following surgery  Z48.89                      Subjective: Patient notes the hand is moving much better.       Objective: See treatment diary below       Auth Tracker  Auth Status Total   Visits  Expiration date Co-Insurance   pending                                  Visit Tracker  Date 3/5 3/7 3/12                                                                                    PMHx:   has a past medical history of Anxiety, Cellulitis, Chronic rhinitis, Colon polyp, GERD without esophagitis, Hyperlipidemia, Hypometabolism, and Vitamin D deficiency.    Precautions:        Manuals HEP 3/12/2024                        Ther Ex     Education on HEP and dx  x5min   AROM tendon glides x x10   AROM table top extension x x10   AROM digit add/abduction x x10   AROM elbow flex/ext  x5   AROM wrist flex/ext/RD/UD  x10   Wrist cock up splint     AROM forearm rotation  x10        Ther Act      Towel scrunch   x5   Dice IHM  x10   Splint adjustment  X5 min    Wrist maze  X5             Modalities     MHP  5 min           Assessment:   Patient tolerated session well. Session focused on ROM and strengthening to improve deficits and functional performance with daily activities. Incision continues to look CDI at this time. Patient tolerated all TE/TA with no complaints. Patient progressing well towards goals. Patient benefiting from skilled hand therapy OT to reduce deficits to improve independence with daily activities.        Plan:   Focus on ROM to improve ability to complete daily activites with ease.  POC 3/5/24 - 24

## 2024-03-14 ENCOUNTER — OFFICE VISIT (OUTPATIENT)
Dept: OCCUPATIONAL THERAPY | Facility: CLINIC | Age: 69
End: 2024-03-14
Payer: COMMERCIAL

## 2024-03-14 ENCOUNTER — OFFICE VISIT (OUTPATIENT)
Dept: OBGYN CLINIC | Facility: CLINIC | Age: 69
End: 2024-03-14

## 2024-03-14 ENCOUNTER — APPOINTMENT (OUTPATIENT)
Dept: RADIOLOGY | Facility: AMBULARY SURGERY CENTER | Age: 69
End: 2024-03-14
Attending: SURGERY
Payer: COMMERCIAL

## 2024-03-14 VITALS
HEART RATE: 58 BPM | DIASTOLIC BLOOD PRESSURE: 97 MMHG | SYSTOLIC BLOOD PRESSURE: 171 MMHG | BODY MASS INDEX: 30.73 KG/M2 | WEIGHT: 180 LBS | HEIGHT: 64 IN

## 2024-03-14 DIAGNOSIS — S52.532D CLOSED COLLES' FRACTURE OF LEFT RADIUS WITH ROUTINE HEALING, SUBSEQUENT ENCOUNTER: Primary | ICD-10-CM

## 2024-03-14 DIAGNOSIS — S52.502A DISPLACED FRACTURE OF DISTAL END OF LEFT RADIUS: ICD-10-CM

## 2024-03-14 PROCEDURE — 73110 X-RAY EXAM OF WRIST: CPT

## 2024-03-14 PROCEDURE — 97110 THERAPEUTIC EXERCISES: CPT

## 2024-03-14 PROCEDURE — 99024 POSTOP FOLLOW-UP VISIT: CPT | Performed by: SURGERY

## 2024-03-14 PROCEDURE — 97530 THERAPEUTIC ACTIVITIES: CPT

## 2024-03-14 NOTE — PROGRESS NOTES
Assessment/Plan:  Patient ID: Maira Colby 68 y.o. 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 year old female who presents for follow up after Open Reduction W/ Internal Fixation (orif) Radius / Ulna (wrist) - Left and Release Carpal Tunnel - Left. Today patient has some soreness and stiffness in the wrist but is overall doing very well. She has been working with therapy and progressing well. No paresthesias or other issues.       PHYSICAL EXAMINATION:  General: well developed and well nourished, alert, oriented times 3, and appears comfortable  Psychiatric: Normal    MUSCULOSKELETAL EXAMINATION:  Incision: Clean, dry, intact  Surgery Site: normal, no evidence of infection   Range of Motion: full composite fist possible  Neurovascular status: Neuro intact, good cap refill  Activity Restrictions: Cast/splint restrictions  Done today: Sutures out      STUDIES REVIEWED:  Images were reviewed in PACS by Dr. Durant and demonstrate: reduced distal radius fracture s/p ORIF, hardware intact       PROCEDURES PERFORMED:  Procedures  No Procedures performed today    Syeda Senll

## 2024-03-14 NOTE — LETTER
March 14, 2024     Patient: Maira Colby  YOB: 1955  Date of Visit: 3/14/2024      To Whom it May Concern:    Maira Colby is under my professional care. Maira was seen in my office on 3/14/2024. Maira may return to work on 3/15/24 without restrictions.    If you have any questions or concerns, please don't hesitate to call.         Sincerely,          Man Durant MD        CC: No Recipients

## 2024-03-14 NOTE — PROGRESS NOTES
Daily Note     Today's date: 3/14/2024  Patient name: Maira Colby  : 1955  MRN: [id number]  Referring provider: Syeda Snell PA-C  Dx:   Encounter Diagnosis     ICD-10-CM    1. Closed Colles' fracture of left radius with routine healing, subsequent encounter  S52.532D                      Subjective: Patient offers no functional changes.       Objective: See treatment diary below       Auth Tracker  Auth Status Total   Visits  Expiration date Co-Insurance   pending                                  Visit Tracker  Date 3/5 3/7 3/12 3/14                                                                                   PMHx:   has a past medical history of Anxiety, Cellulitis, Chronic rhinitis, Colon polyp, GERD without esophagitis, Hyperlipidemia, Hypometabolism, and Vitamin D deficiency.    Precautions:        Manuals HEP 3/14/2024                        Ther Ex     Education on HEP and dx  x5min   AROM tendon glides x x10   AROM table top extension x x10   AROM digit add/abduction x x10   AROM elbow flex/ext  x5   AROM wrist flex/ext/RD/UD  x10   Wrist cock up splint     AROM forearm rotation  x10        Ther Act      Towel scrunch   x5   Dice IHM  x10   Splint adjustment  X5 min    Wrist maze  X5             Modalities     MHP  5 min           Assessment:   Patient tolerated session well. Session focused on ROM and strengthening to improve deficits and functional performance with daily activities. Incision continues to look CDI at this time. Patient tolerated all TE/TA with no complaints. Patient progressing well towards goals. Patient benefiting from skilled hand therapy OT to reduce deficits to improve independence with daily activities.        Plan:   Focus on ROM to improve ability to complete daily activites with ease.  POC 3/5/24 - 24

## 2024-03-18 ENCOUNTER — OFFICE VISIT (OUTPATIENT)
Dept: OCCUPATIONAL THERAPY | Facility: CLINIC | Age: 69
End: 2024-03-18
Payer: COMMERCIAL

## 2024-03-18 DIAGNOSIS — Z48.89 AFTERCARE FOLLOWING SURGERY: Primary | ICD-10-CM

## 2024-03-18 DIAGNOSIS — S52.572D CLOSED INTRA-ARTICULAR DIE-PUNCH FRACTURE OF LEFT RADIUS WITH ROUTINE HEALING, SUBSEQUENT ENCOUNTER: ICD-10-CM

## 2024-03-18 DIAGNOSIS — S52.532D CLOSED COLLES' FRACTURE OF LEFT RADIUS WITH ROUTINE HEALING, SUBSEQUENT ENCOUNTER: ICD-10-CM

## 2024-03-18 DIAGNOSIS — G56.02 LEFT CARPAL TUNNEL SYNDROME: ICD-10-CM

## 2024-03-18 PROCEDURE — 97110 THERAPEUTIC EXERCISES: CPT

## 2024-03-18 PROCEDURE — 97530 THERAPEUTIC ACTIVITIES: CPT

## 2024-03-18 NOTE — PROGRESS NOTES
Daily Note     Today's date: 3/18/2024  Patient name: Maira Colby  : 1955  MRN: [id number]  Referring provider: Syeda Snell PA-C  Dx:   Encounter Diagnosis     ICD-10-CM    1. Aftercare following surgery  Z48.89       2. Left carpal tunnel syndrome  G56.02       3. Closed intra-articular die-punch fracture of left radius with routine healing, subsequent encounter  S52.572D       4. Closed Colles' fracture of left radius with routine healing, subsequent encounter  S52.532D                      Subjective: Patient offers no functional changes.       Objective: See treatment diary below       Auth Tracker  Auth Status Total   Visits  Expiration date Co-Insurance   pending                                  Visit Tracker  Date 3/5 3/7 3/12 3/14                                                                                   PMHx:   has a past medical history of Anxiety, Cellulitis, Chronic rhinitis, Colon polyp, GERD without esophagitis, Hyperlipidemia, Hypometabolism, and Vitamin D deficiency.    Precautions:        Manuals HEP 3/18/2024                        Ther Ex     Education on HEP and dx  x5min   AROM tendon glides x x10   AROM table top extension x x10   AROM digit add/abduction x x10   AROM elbow flex/ext  x5   AROM wrist flex/ext/RD/UD  x10   Wrist cock up splint     AROM forearm rotation  x10        Ther Act      Towel scrunch   x5   Dice IHM  x10   Splint adjustment  X5 min    Wrist maze  X5             Modalities     MHP  5 min           Assessment:   Patient tolerated session well. Session focused on ROM and strengthening to improve deficits and functional performance with daily activities. Incision continues to look CDI at this time. Patient tolerated all TE/TA with no complaints. Patient progressing well towards goals. Patient benefiting from skilled hand therapy OT to reduce deficits to improve independence with daily activities.        Plan:   Focus on ROM to improve ability to complete  daily activites with ease.  POC 3/5/24 - 5/14/24

## 2024-03-21 ENCOUNTER — OFFICE VISIT (OUTPATIENT)
Dept: OCCUPATIONAL THERAPY | Facility: CLINIC | Age: 69
End: 2024-03-21
Payer: COMMERCIAL

## 2024-03-21 DIAGNOSIS — S52.532D CLOSED COLLES' FRACTURE OF LEFT RADIUS WITH ROUTINE HEALING, SUBSEQUENT ENCOUNTER: Primary | ICD-10-CM

## 2024-03-21 PROCEDURE — 97530 THERAPEUTIC ACTIVITIES: CPT

## 2024-03-21 PROCEDURE — 97110 THERAPEUTIC EXERCISES: CPT

## 2024-03-21 NOTE — PROGRESS NOTES
Daily Note     Today's date: 3/21/2024  Patient name: Maira Colby  : 1955  MRN: [id number]  Referring provider: Syeda Snell PA-C  Dx:   Encounter Diagnosis     ICD-10-CM    1. Closed Colles' fracture of left radius with routine healing, subsequent encounter  S52.532D                      Subjective: Patient offers no functional changes.       Objective: See treatment diary below       Auth Tracker  Auth Status Total   Visits  Expiration date Co-Insurance   pending                                  Visit Tracker  Date 3/5 3/7 3/12 3/14 3/21                                                                                  PMHx:   has a past medical history of Anxiety, Cellulitis, Chronic rhinitis, Colon polyp, GERD without esophagitis, Hyperlipidemia, Hypometabolism, and Vitamin D deficiency.    Precautions:        Manuals HEP 3/21/2024                        Ther Ex     Education on HEP and dx  x5min   AROM tendon glides x x10   AROM table top extension x x10   AROM digit add/abduction x x10   AROM elbow flex/ext  x5   AROM wrist flex/ext/RD/UD  x10   Wrist cock up splint     AROM forearm rotation  x10        Ther Act      Towel scrunch   x5   Dice IHM  x10   Splint adjustment  X5 min    Wrist maze  X5             Modalities     MHP  5 min           Assessment:   Patient tolerated session well. Session focused on ROM and strengthening to improve deficits and functional performance with daily activities. Incision continues to look CDI at this time. Patient tolerated all TE/TA with no complaints. Patient progressing well towards goals. Patient benefiting from skilled hand therapy OT to reduce deficits to improve independence with daily activities.        Plan:   Focus on ROM to improve ability to complete daily activites with ease.  POC 3/5/24 - 24

## 2024-03-25 ENCOUNTER — OFFICE VISIT (OUTPATIENT)
Dept: OCCUPATIONAL THERAPY | Facility: CLINIC | Age: 69
End: 2024-03-25
Payer: COMMERCIAL

## 2024-03-25 DIAGNOSIS — S52.532D CLOSED COLLES' FRACTURE OF LEFT RADIUS WITH ROUTINE HEALING, SUBSEQUENT ENCOUNTER: Primary | ICD-10-CM

## 2024-03-25 PROCEDURE — 97110 THERAPEUTIC EXERCISES: CPT

## 2024-03-25 PROCEDURE — 97530 THERAPEUTIC ACTIVITIES: CPT

## 2024-03-25 NOTE — PROGRESS NOTES
Daily Note     Today's date: 3/25/2024  Patient name: Maira Colby  : 1955  MRN: [id number]  Referring provider: Syeda Snell PA-C  Dx:   Encounter Diagnosis     ICD-10-CM    1. Closed Colles' fracture of left radius with routine healing, subsequent encounter  S52.532D                      Subjective: Patient offers no functional changes.       Objective: See treatment diary below       Auth Tracker  Auth Status Total   Visits  Expiration date Co-Insurance   approved                                 Visit Tracker  Date 3/5 3/7 3/12 3/14 3/21 3/25         x                                                                        PMHx:   has a past medical history of Anxiety, Cellulitis, Chronic rhinitis, Colon polyp, GERD without esophagitis, Hyperlipidemia, Hypometabolism, and Vitamin D deficiency.    Precautions:        Manuals HEP 3/25/2024                        Ther Ex     Education on HEP and dx  x5min   STM  x10   AROM tendon glides x x10   AROM table top extension x x10   AROM digit add/abduction x x10   AROM elbow flex/ext  x5   AROM wrist flex/ext/RD/UD  x10   Wrist cock up splint     AROM forearm rotation  x10        Ther Act      Towel scrunch   x5   Dice IHM  x10   Splint adjustment  X5 min    Wrist maze  X5   Wrist rotator  x20             Modalities     MHP  5 min           Assessment:   Patient tolerated session well. Session focused on ROM and strengthening to improve deficits and functional performance with daily activities. Patient tolerated all TE/TA with no complaints. Patient progressing well towards goals. Patient benefiting from skilled hand therapy OT to reduce deficits to improve independence with daily activities.        Plan:   Focus on ROM to improve ability to complete daily activites with ease.  POC 3/5/24 - 24

## 2024-03-28 ENCOUNTER — OFFICE VISIT (OUTPATIENT)
Dept: OCCUPATIONAL THERAPY | Facility: CLINIC | Age: 69
End: 2024-03-28
Payer: COMMERCIAL

## 2024-03-28 DIAGNOSIS — S52.532D CLOSED COLLES' FRACTURE OF LEFT RADIUS WITH ROUTINE HEALING, SUBSEQUENT ENCOUNTER: Primary | ICD-10-CM

## 2024-03-28 PROCEDURE — 97530 THERAPEUTIC ACTIVITIES: CPT

## 2024-03-28 PROCEDURE — 97110 THERAPEUTIC EXERCISES: CPT

## 2024-03-28 NOTE — PROGRESS NOTES
Daily Note     Today's date: 3/28/2024  Patient name: Maira Colby  : 1955  MRN: [id number]  Referring provider: Syeda Snell PA-C  Dx:   Encounter Diagnosis     ICD-10-CM    1. Closed Colles' fracture of left radius with routine healing, subsequent encounter  S52.532D                      Subjective: Patient offers no functional changes.       Objective: See treatment diary below       Auth Tracker  Auth Status Total   Visits  Expiration date Co-Insurance   approved                                 Visit Tracker  Date 3/5 3/7 3/12 3/14 3/21 3/25         x                                                                        PMHx:   has a past medical history of Anxiety, Cellulitis, Chronic rhinitis, Colon polyp, GERD without esophagitis, Hyperlipidemia, Hypometabolism, and Vitamin D deficiency.    Precautions:        Manuals HEP 3/28/2024                        Ther Ex     Education on HEP and dx  x5min   STM  x10   AROM tendon glides x x10   AROM table top extension x x10   AROM digit add/abduction x x10   AROM elbow flex/ext  x5   AROM wrist flex/ext/RD/UD  x10   Wrist cock up splint     AROM forearm rotation  x10   Sponge squeeze  X10 green   Digit ext band   X10 yellow             Ther Act      Towel scrunch   x5   Dice IHM  x10   Splint adjustment  X5 min    Wrist maze  X5   Wrist rotator  x20             Modalities     MHP  5 min           Assessment:   Patient tolerated session well. Session focused on ROM and strengthening to improve deficits and functional performance with daily activities. Patient tolerated all TE/TA with no complaints. Patient progressing well towards goals. Patient benefiting from skilled hand therapy OT to reduce deficits to improve independence with daily activities.        Plan:   Focus on ROM to improve ability to complete daily activites with ease.  POC 3/5/24 - 24

## 2024-04-04 ENCOUNTER — OFFICE VISIT (OUTPATIENT)
Dept: OCCUPATIONAL THERAPY | Facility: CLINIC | Age: 69
End: 2024-04-04
Payer: COMMERCIAL

## 2024-04-04 DIAGNOSIS — S52.532D CLOSED COLLES' FRACTURE OF LEFT RADIUS WITH ROUTINE HEALING, SUBSEQUENT ENCOUNTER: Primary | ICD-10-CM

## 2024-04-04 PROCEDURE — 97110 THERAPEUTIC EXERCISES: CPT

## 2024-04-04 PROCEDURE — 97530 THERAPEUTIC ACTIVITIES: CPT

## 2024-04-04 NOTE — PROGRESS NOTES
Daily Note     Today's date: 2024  Patient name: Maira Colby  : 1955  MRN: [id number]  Referring provider: Syeda Snell PA-C  Dx:   Encounter Diagnosis     ICD-10-CM    1. Closed Colles' fracture of left radius with routine healing, subsequent encounter  S52.532D                      Subjective: Patient offers no functional changes.       Objective: See treatment diary below       Auth Tracker  Auth Status Total   Visits  Expiration date Co-Insurance   approved                                 Visit Tracker  Date 3/5 3/7 3/12 3/14 3/21 3/25    4/4     x                                                                        PMHx:   has a past medical history of Anxiety, Cellulitis, Chronic rhinitis, Colon polyp, GERD without esophagitis, Hyperlipidemia, Hypometabolism, and Vitamin D deficiency.    Precautions:        Manuals HEP 2024                        Ther Ex     Education on HEP and dx  x5min   STM  x10   AROM tendon glides x x10   AROM table top extension x x10   AROM digit add/abduction x x10   AROM elbow flex/ext  x5   AROM wrist flex/ext/RD/UD  x10   Wrist cock up splint     AROM forearm rotation  x10   Sponge squeeze  X10 green   Digit ext band   X10 yellow             Ther Act      Towel scrunch   x5   Dice IHM  x10   Splint adjustment  X5 min    Wrist maze  X5   Wrist rotator  x20             Modalities     MHP  5 min           Assessment:   Patient tolerated session well. Session focused on ROM and strengthening to improve deficits and functional performance with daily activities. Patient tolerated all TE/TA with no complaints. Patient progressing well towards goals. Patient benefiting from skilled hand therapy OT to reduce deficits to improve independence with daily activities.        Plan:   Focus on ROM to improve ability to complete daily activites with ease.  POC 3/5/24 - 24

## 2024-04-09 ENCOUNTER — OFFICE VISIT (OUTPATIENT)
Dept: OCCUPATIONAL THERAPY | Facility: CLINIC | Age: 69
End: 2024-04-09
Payer: COMMERCIAL

## 2024-04-09 DIAGNOSIS — S52.572D CLOSED INTRA-ARTICULAR DIE-PUNCH FRACTURE OF LEFT RADIUS WITH ROUTINE HEALING, SUBSEQUENT ENCOUNTER: ICD-10-CM

## 2024-04-09 DIAGNOSIS — G56.02 LEFT CARPAL TUNNEL SYNDROME: ICD-10-CM

## 2024-04-09 DIAGNOSIS — Z48.89 AFTERCARE FOLLOWING SURGERY: ICD-10-CM

## 2024-04-09 DIAGNOSIS — S52.532D CLOSED COLLES' FRACTURE OF LEFT RADIUS WITH ROUTINE HEALING, SUBSEQUENT ENCOUNTER: Primary | ICD-10-CM

## 2024-04-09 PROCEDURE — 97110 THERAPEUTIC EXERCISES: CPT

## 2024-04-09 PROCEDURE — 97530 THERAPEUTIC ACTIVITIES: CPT

## 2024-04-09 NOTE — PROGRESS NOTES
Re-Evaluation Note     Today's date: 2024  Patient name: Maira Colby  : 1955  MRN: [id number]  Referring provider: Syeda Snell PA-C  Dx:   Encounter Diagnosis     ICD-10-CM    1. Closed Colles' fracture of left radius with routine healing, subsequent encounter  S52.532D       2. Aftercare following surgery  Z48.89       3. Left carpal tunnel syndrome  G56.02       4. Closed intra-articular die-punch fracture of left radius with routine healing, subsequent encounter  S52.572D                    Subjective: Patient states she has been using her hand more and not wearing the brace as much.         Objective: See treatment diary below      Active Range of Motion     Left Elbow   Forearm supination: 60 degrees   Forearm pronation: 80 degrees     Right Elbow   Forearm supination: 84 degrees   Forearm pronation: 84 degrees     Left Wrist   Wrist flexion: 39 degrees   Wrist extension: 44 degrees (25 degrees previously)  Radial deviation: 20 degrees   Ulnar deviation: 18 degrees     Right Wrist   Wrist flexion: 62 degrees   Wrist extension: 64 degrees   Radial deviation: 21 degrees   Ulnar deviation: 22 degrees     Left Thumb   Kapandji score: 10 degrees      Right Thumb   Opposition: WNL  Kapandji score: 10 degrees      Goals  Short term goals 2-4 weeks  Establish HEP to enhance performance with ADLs.    Improve active range of motion of LUE by 20  to assist with UE dressing. MET    Achieve composite digital flexion to touch distal jackson crease for grasp on utensils. MET    In crease  strength by 10 lbs. to enhance gripping hand tools.     Achieve appropriate wound closure in 10 - 14 days post op as evidenced by lack of infection. MET      Long Term goals by discharge  Establish final home exercise program to enhance maximal functional level with ADLs.    Achieve functional active range of motion of LUE for full return to household chores.       Achieve functional strength of LUE for full return to high  level ADLs.         Auth Tracker  Auth Status Total   Visits  Expiration date Co-Insurance   approved 12 6/5                                Visit Tracker  Date 3/5 3/7 3/12 3/14 3/18 3/21 3/25 3/28    4/4 4/9  RE     x                                                                                       PMHx:   has a past medical history of Anxiety, Cellulitis, Chronic rhinitis, Colon polyp, GERD without esophagitis, Hyperlipidemia, Hypometabolism, and Vitamin D deficiency.    Precautions:        Manuals HEP 4/9/2024                        Ther Ex     Education on HEP and dx  x5min   STM  x10   AROM tendon glides x x10   AROM table top extension x x10   AROM digit add/abduction x x10   AROM elbow flex/ext  x5   AROM wrist flex/ext/RD/UD  x10   Wrist cock up splint     AROM forearm rotation  x10   Sponge squeeze  X10 green   Digit ext band   X10 yellow   Measurements  x        Ther Act      Towel scrunch   x5   Dice IHM  x10   Splint adjustment  X5 min    Wrist maze  X5   Wrist rotator  x20             Modalities     MHP  5 min           Assessment:   Progress measurements taken in which patient presents with increased supination and pronation measurements as well as increased digital AROM, has achieved many short term goals and is making good progress towards long term goals. Patient tolerated session well. Session focused on ROM and strengthening to improve deficits and functional performance with daily activities. Patient tolerated all TE/TA with no complaints. Patient benefiting from skilled hand therapy OT to reduce deficits to improve independence with daily activities.        Plan:   Focus on ROM to improve ability to complete daily activites with ease.  POC 4/9/24 - 6/14/24

## 2024-04-11 ENCOUNTER — OFFICE VISIT (OUTPATIENT)
Dept: OCCUPATIONAL THERAPY | Facility: CLINIC | Age: 69
End: 2024-04-11
Payer: COMMERCIAL

## 2024-04-11 DIAGNOSIS — S52.532D CLOSED COLLES' FRACTURE OF LEFT RADIUS WITH ROUTINE HEALING, SUBSEQUENT ENCOUNTER: Primary | ICD-10-CM

## 2024-04-11 DIAGNOSIS — S52.572D CLOSED INTRA-ARTICULAR DIE-PUNCH FRACTURE OF LEFT RADIUS WITH ROUTINE HEALING, SUBSEQUENT ENCOUNTER: ICD-10-CM

## 2024-04-11 DIAGNOSIS — Z48.89 AFTERCARE FOLLOWING SURGERY: ICD-10-CM

## 2024-04-11 DIAGNOSIS — G56.02 LEFT CARPAL TUNNEL SYNDROME: ICD-10-CM

## 2024-04-11 PROCEDURE — 97530 THERAPEUTIC ACTIVITIES: CPT

## 2024-04-11 PROCEDURE — 97110 THERAPEUTIC EXERCISES: CPT

## 2024-04-11 NOTE — PROGRESS NOTES
Daily Note     Today's date: 2024  Patient name: Maira Colby  : 1955  MRN: [id number]  Referring provider: Syeda Snell PA-C  Dx:   Encounter Diagnosis     ICD-10-CM    1. Closed Colles' fracture of left radius with routine healing, subsequent encounter  S52.532D       2. Aftercare following surgery  Z48.89       3. Left carpal tunnel syndrome  G56.02       4. Closed intra-articular die-punch fracture of left radius with routine healing, subsequent encounter  S52.572D                  Subjective: Patient states she has been using her hand more and not wearing the brace as much.         Objective: See treatment diary below       Auth Tracker  Auth Status Total   Visits  Expiration date Co-Insurance   approved                                 Visit Tracker  Date 3/5 3/7 3/12 3/14 3/18 3/21 3/25 3/28    4/4 4/9  RE     x                                                                                       PMHx:   has a past medical history of Anxiety, Cellulitis, Chronic rhinitis, Colon polyp, GERD without esophagitis, Hyperlipidemia, Hypometabolism, and Vitamin D deficiency.    Precautions:        Manuals HEP 2024                        Ther Ex     Education on HEP and dx  x5min   STM  x10   AROM tendon glides x x10   AROM table top extension x x10   AROM digit add/abduction x x10   AROM elbow flex/ext  x5   AROM wrist flex/ext/RD/UD  2x10   Wrist cock up splint     AROM forearm rotation  x10   Sponge squeeze  X10 green   Digit ext band   X10 yellow   Measurements     Digiflex  2 x10 yellow   Prayer stretch   5 x 10 sec        Ther Act      Towel scrunch   x5   Dice IHM  x10   Splint adjustment     Wrist maze  X5   Wrist rotator  x20   Sponges dart throwers motion  Whole container        Modalities     MHP  x5 min           Assessment:    Patient tolerated session well. Session focused on ROM and strengthening to improve deficits and functional performance with daily activities. Patient  tolerated all TE/TA with no complaints. Patient benefiting from skilled hand therapy OT to reduce deficits to improve independence with daily activities.        Plan:   Focus on ROM to improve ability to complete daily activites with ease.  POC 4/9/24 - 6/14/24

## 2024-04-16 ENCOUNTER — OFFICE VISIT (OUTPATIENT)
Dept: OCCUPATIONAL THERAPY | Facility: CLINIC | Age: 69
End: 2024-04-16
Payer: COMMERCIAL

## 2024-04-16 DIAGNOSIS — G56.02 LEFT CARPAL TUNNEL SYNDROME: ICD-10-CM

## 2024-04-16 DIAGNOSIS — Z48.89 AFTERCARE FOLLOWING SURGERY: ICD-10-CM

## 2024-04-16 DIAGNOSIS — S52.532D CLOSED COLLES' FRACTURE OF LEFT RADIUS WITH ROUTINE HEALING, SUBSEQUENT ENCOUNTER: Primary | ICD-10-CM

## 2024-04-16 DIAGNOSIS — S52.572D CLOSED INTRA-ARTICULAR DIE-PUNCH FRACTURE OF LEFT RADIUS WITH ROUTINE HEALING, SUBSEQUENT ENCOUNTER: ICD-10-CM

## 2024-04-16 PROCEDURE — 97110 THERAPEUTIC EXERCISES: CPT

## 2024-04-16 PROCEDURE — 97140 MANUAL THERAPY 1/> REGIONS: CPT

## 2024-04-16 PROCEDURE — 97530 THERAPEUTIC ACTIVITIES: CPT

## 2024-04-16 NOTE — PROGRESS NOTES
Daily Note     Today's date: 2024  Patient name: Maira Colby  : 1955  MRN: [id number]  Referring provider: Syeda Snell PA-C  Dx:   Encounter Diagnosis     ICD-10-CM    1. Closed Colles' fracture of left radius with routine healing, subsequent encounter  S52.532D       2. Aftercare following surgery  Z48.89       3. Left carpal tunnel syndrome  G56.02       4. Closed intra-articular die-punch fracture of left radius with routine healing, subsequent encounter  S52.572D                  Subjective: Patient states she has been using her hand more and not wearing the brace as much, has less pain compared to previous week.          Objective: See treatment diary below       Auth Tracker  Auth Status Total   Visits  Expiration date Co-Insurance   approved                                 Visit Tracker  Date 3/5 3/7 3/12 3/14 3/18 3/21 3/25 3/28    4/4 4/9  RE    x                                                                                       PMHx:   has a past medical history of Anxiety, Cellulitis, Chronic rhinitis, Colon polyp, GERD without esophagitis, Hyperlipidemia, Hypometabolism, and Vitamin D deficiency.    Precautions:        Manuals HEP 2024    Gentle PROM/STM to CTR incision   X8 min                  Ther Ex     Education on HEP and dx  x5min   AROM tendon glides x x10   AROM table top extension x x10   AROM digit add/abduction x    AROM wrist flex/ext/RD/UD  2x10   Wrist cock up splint     Forearm rotation flexbar  2X10 green flexbar   Sponge squeeze x X10 green HEP only   Digit ext band   X10 yellow   Measurements     Digiflex  2 x10 yellow   Prayer stretch   5 x 10 sec        Ther Act      Towel scrunch   x5   Dice IHM  x10   Splint adjustment     Wrist maze  X5   Wrist rotator  x20   Sponges dart throwers motion  Whole container   AROM wrist flex/ext with red ball   X2 min              Modalities     MHP  x5 min           Assessment:    Patient tolerated session  "well with overall improved ability compared to previous session. Session focused on ROM and light strengthening to improve deficits and functional performance with daily activities. Patient tolerated all TE/TA with minimal complaints of \"pulling\" at the wrist crease. Patient benefiting from skilled hand therapy OT to reduce deficits to improve independence with daily activities.        Plan:   Focus on ROM to improve ability to complete daily activites with ease.  POC 4/9/24 - 6/14/24        "

## 2024-04-17 ENCOUNTER — OFFICE VISIT (OUTPATIENT)
Dept: OBGYN CLINIC | Facility: CLINIC | Age: 69
End: 2024-04-17

## 2024-04-17 ENCOUNTER — APPOINTMENT (OUTPATIENT)
Dept: RADIOLOGY | Facility: AMBULARY SURGERY CENTER | Age: 69
End: 2024-04-17
Attending: SURGERY
Payer: COMMERCIAL

## 2024-04-17 VITALS
BODY MASS INDEX: 30.73 KG/M2 | DIASTOLIC BLOOD PRESSURE: 86 MMHG | HEART RATE: 61 BPM | SYSTOLIC BLOOD PRESSURE: 149 MMHG | HEIGHT: 64 IN | WEIGHT: 180 LBS

## 2024-04-17 DIAGNOSIS — S52.532D CLOSED COLLES' FRACTURE OF LEFT RADIUS WITH ROUTINE HEALING, SUBSEQUENT ENCOUNTER: Primary | ICD-10-CM

## 2024-04-17 DIAGNOSIS — S52.532D CLOSED COLLES' FRACTURE OF LEFT RADIUS WITH ROUTINE HEALING, SUBSEQUENT ENCOUNTER: ICD-10-CM

## 2024-04-17 PROCEDURE — 99024 POSTOP FOLLOW-UP VISIT: CPT | Performed by: SURGERY

## 2024-04-17 PROCEDURE — 73110 X-RAY EXAM OF WRIST: CPT

## 2024-04-17 NOTE — PROGRESS NOTES
Assessment/Plan:  Patient ID: Maira Colby 68 y.o. female   Surgery: Open Reduction W/ Internal Fixation (orif) Radius / Ulna (wrist) - Left and Release Carpal Tunnel - Left  Date of Surgery: 3/1/2024    New xrays of the left wrist were obtained today and reviewed at today's visit, noting that the hardware is in appropriate position with no signs of failure. Fracture is well healed. At this time, d/c the splint. Encourage the patent to heat up the wrist prior to her stretches.   If she does too much, it'll be stiff, sore and may swell but she isn't hurting anything. The bone is healed enough that she can challenge the wrist.     Follow Up:  6  week(s)    To Do Next Visit:  Re-evaluate ROM      CHIEF COMPLAINT:  Chief Complaint   Patient presents with    Follow-up     Patient says she is doing great from her last visit going for repeat XR today         SUBJECTIVE:  Maira Colby is a 68 y.o. year old female who presents for follow up after Open Reduction W/ Internal Fixation (orif) Radius / Ulna (wrist) - Left and Release Carpal Tunnel - Left. Today patient has Stiffness/LROM.  She has been in OT working on her motion and strength.       PHYSICAL EXAMINATION:  General: well developed and well nourished, alert, oriented times 3, and appears comfortable  Psychiatric: Normal    MUSCULOSKELETAL EXAMINATION:  Incision: Clean, dry, intact and healed  Surgery Site: normal, no evidence of infection   Range of Motion: As expected, opposition intact, full composite fist possible, and flexion 30 degrees, extension 20 degrees, lacking 10 degrees of supination, full pronation   Neurovascular status: Neuro intact, good cap refill and radial pulse 2+  Activity Restrictions: Restrictions: no heavy lifting        STUDIES REVIEWED:  I have personally reviewed and interpreted  AP lateral and oblique radiographs of left wrist 3 views which demonstrate hardware is in appropriate position with no signs of loosening or failure.            LABS REVIEWED:    HgA1c:   Lab Results   Component Value Date    HGBA1C 5.7 (H) 10/18/2023     BMP:   Lab Results   Component Value Date    GLUCOSE 83 04/28/2017    CALCIUM 9.7 02/29/2024     04/28/2017    K 3.9 02/29/2024    CO2 27 02/29/2024     02/29/2024    BUN 10 02/29/2024    CREATININE 0.61 02/29/2024           PROCEDURES PERFORMED:  Procedures  No Procedures performed today    Scribe Attestation      I,:  Linda Rao am acting as a scribe while in the presence of the attending physician.:       I,:  Man Durant MD personally performed the services described in this documentation    as scribed in my presence.:

## 2024-04-18 ENCOUNTER — OFFICE VISIT (OUTPATIENT)
Dept: OCCUPATIONAL THERAPY | Facility: CLINIC | Age: 69
End: 2024-04-18
Payer: COMMERCIAL

## 2024-04-18 DIAGNOSIS — S52.572D CLOSED INTRA-ARTICULAR DIE-PUNCH FRACTURE OF LEFT RADIUS WITH ROUTINE HEALING, SUBSEQUENT ENCOUNTER: ICD-10-CM

## 2024-04-18 DIAGNOSIS — G56.02 LEFT CARPAL TUNNEL SYNDROME: ICD-10-CM

## 2024-04-18 DIAGNOSIS — Z48.89 AFTERCARE FOLLOWING SURGERY: ICD-10-CM

## 2024-04-18 DIAGNOSIS — S52.532D CLOSED COLLES' FRACTURE OF LEFT RADIUS WITH ROUTINE HEALING, SUBSEQUENT ENCOUNTER: Primary | ICD-10-CM

## 2024-04-18 PROCEDURE — 97110 THERAPEUTIC EXERCISES: CPT

## 2024-04-18 PROCEDURE — 97530 THERAPEUTIC ACTIVITIES: CPT

## 2024-04-18 NOTE — PROGRESS NOTES
Daily Note     Today's date: 2024  Patient name: Maira Colby  : 1955  MRN: [id number]  Referring provider: Syeda Snell PA-C  Dx:   Encounter Diagnosis     ICD-10-CM    1. Closed Colles' fracture of left radius with routine healing, subsequent encounter  S52.532D       2. Aftercare following surgery  Z48.89       3. Left carpal tunnel syndrome  G56.02       4. Closed intra-articular die-punch fracture of left radius with routine healing, subsequent encounter  S52.572D                  Subjective: Patient offers no functional changes this date.           Objective: See treatment diary below       Auth Tracker  Auth Status Total   Visits  Expiration date Co-Insurance   approved     approved                           Visit Tracker  Date 3/5 3/7 3/12 3/14 3/18 3/21 3/25 3/28    4/4 4/9  RE               x                                                                           PMHx:   has a past medical history of Anxiety, Cellulitis, Chronic rhinitis, Colon polyp, GERD without esophagitis, Hyperlipidemia, Hypometabolism, and Vitamin D deficiency.    Precautions: DR TORO sx 3/1/24       Manuals HEP 2024                        Ther Ex     Education on HEP and dx  x5min   AROM tendon glides x x10   AROM table top extension x x10   AROM digit add/abduction x    AROM wrist flex/ext/RD/UD  2x10   Therapist assisted PROM  X3 min    Scar massage and cupping  X3 min         Digit ext band   X10 red             Prayer stretch   5 x 10 sec        Ther Act      Towel scrunch   x5   Flexbar all planes  X10 yellow         Wrist maze  X5   Wrist rotator  x20   Gripper with sponges  Level 1   AROM wrist flex/ext with red ball   X2 min              Modalities     MHP  x5 min           Assessment:   Patient tolerated session well. Session focused on ROM and strengthening to improve deficits and functional performance with daily activities. Patient tolerated all TE/TA with no complaints.  Patient progressing well towards goals. Patient benefiting from skilled hand therapy OT to reduce deficits to improve independence with daily activities.        Plan:   Focus on ROM and strengthening to improve ability to complete daily activites with ease.  POC 4/9/24 - 6/14/24

## 2024-04-22 ENCOUNTER — OFFICE VISIT (OUTPATIENT)
Dept: OCCUPATIONAL THERAPY | Facility: CLINIC | Age: 69
End: 2024-04-22
Payer: COMMERCIAL

## 2024-04-22 DIAGNOSIS — S52.532D CLOSED COLLES' FRACTURE OF LEFT RADIUS WITH ROUTINE HEALING, SUBSEQUENT ENCOUNTER: Primary | ICD-10-CM

## 2024-04-22 PROCEDURE — 97110 THERAPEUTIC EXERCISES: CPT

## 2024-04-22 PROCEDURE — 97530 THERAPEUTIC ACTIVITIES: CPT

## 2024-04-22 NOTE — PROGRESS NOTES
Daily Note     Today's date: 2024  Patient name: Maira Colby  : 1955  MRN: [id number]  Referring provider: Syeda Snell PA-C  Dx:   Encounter Diagnosis     ICD-10-CM    1. Closed Colles' fracture of left radius with routine healing, subsequent encounter  S52.532D                  Subjective: Patient offers no functional changes this date.           Objective: See treatment diary below       Auth Tracker  Auth Status Total   Visits  Expiration date Co-Insurance   approved     approved                           Visit Tracker  Date 3/5 3/7 3/12 3/14 3/18 3/21 3/25 3/28    4/4 4/9  RE              x                                                                           PMHx:   has a past medical history of Anxiety, Cellulitis, Chronic rhinitis, Colon polyp, GERD without esophagitis, Hyperlipidemia, Hypometabolism, and Vitamin D deficiency.    Precautions: DR TORO sx 3/1/24       Manuals HEP 2024                        Ther Ex     Education on HEP and dx  x5min   AROM tendon glides x x10   AROM table top extension x x10   AROM digit add/abduction x    AROM wrist flex/ext/RD/UD  2x10   Therapist assisted PROM  X3 min    Scar massage and cupping  X3 min         Digit ext band   X10 red             Prayer stretch   5 x 10 sec        Ther Act      Towel scrunch   x5   Flexbar all planes  X10 yellow         Wrist maze  X5   Wrist rotator  x20   Gripper with sponges  Level 1   AROM wrist flex/ext with red ball   X2 min              Modalities     MHP  x5 min           Assessment:   Patient tolerated session well. Session focused on ROM and strengthening to improve deficits and functional performance with daily activities. Patient tolerated all TE/TA with no complaints. Patient progressing well towards goals. Patient benefiting from skilled hand therapy OT to reduce deficits to improve independence with daily activities.        Plan:   Focus on ROM and strengthening to  improve ability to complete daily activites with ease.  POC 4/9/24 - 6/14/24

## 2024-04-25 ENCOUNTER — OFFICE VISIT (OUTPATIENT)
Dept: OCCUPATIONAL THERAPY | Facility: CLINIC | Age: 69
End: 2024-04-25
Payer: COMMERCIAL

## 2024-04-25 DIAGNOSIS — S52.532D CLOSED COLLES' FRACTURE OF LEFT RADIUS WITH ROUTINE HEALING, SUBSEQUENT ENCOUNTER: Primary | ICD-10-CM

## 2024-04-25 PROCEDURE — 97530 THERAPEUTIC ACTIVITIES: CPT

## 2024-04-25 PROCEDURE — 97110 THERAPEUTIC EXERCISES: CPT

## 2024-04-25 NOTE — PROGRESS NOTES
Daily Note     Today's date: 2024  Patient name: Maira Colby  : 1955  MRN: [id number]  Referring provider: Syeda Snell PA-C  Dx:   Encounter Diagnosis     ICD-10-CM    1. Closed Colles' fracture of left radius with routine healing, subsequent encounter  S52.532D                  Subjective: Patient offers no functional changes this date.           Objective: See treatment diary below       Auth Tracker  Auth Status Total   Visits  Expiration date Co-Insurance   approved     approved                           Visit Tracker  Date 3/5 3/7 3/12 3/14 3/18 3/21 3/25 3/28    4/4 4/9  RE             x                                                                           PMHx:   has a past medical history of Anxiety, Cellulitis, Chronic rhinitis, Colon polyp, GERD without esophagitis, Hyperlipidemia, Hypometabolism, and Vitamin D deficiency.    Precautions: DR TORO sx 3/1/24       Manuals HEP 2024                        Ther Ex     Education on HEP and dx  x5min   AROM tendon glides x x10   AROM table top extension x x10   AROM digit add/abduction x    AROM wrist flex/ext/RD/UD  2x10   Therapist assisted PROM  X3 min    Scar massage and cupping  X3 min    pronator  X15 1 lbs    Digit ext band   X15 red   PREs wrist flex/ext/RD  X15 2 lbs         Prayer stretch   5 x 10 sec        Ther Act      Towel scrunch   x5   Flexbar all planes  X15 red        Wrist maze  X5   WB theraball  x10   Gripper with sponges  Level 1                  Modalities     MHP  x5 min           Assessment:   Patient tolerated session well. Session focused on ROM and strengthening to improve deficits and functional performance with daily activities. Patient tolerated all TE/TA with no complaints. Patient progressing well towards goals. Patient benefiting from skilled hand therapy OT to reduce deficits to improve independence with daily activities.        Plan:   Focus on ROM and  strengthening to improve ability to complete daily activites with ease.  POC 4/9/24 - 6/14/24

## 2024-04-29 ENCOUNTER — OFFICE VISIT (OUTPATIENT)
Dept: OCCUPATIONAL THERAPY | Facility: CLINIC | Age: 69
End: 2024-04-29
Payer: COMMERCIAL

## 2024-04-29 DIAGNOSIS — Z48.89 AFTERCARE FOLLOWING SURGERY: ICD-10-CM

## 2024-04-29 DIAGNOSIS — S52.532D CLOSED COLLES' FRACTURE OF LEFT RADIUS WITH ROUTINE HEALING, SUBSEQUENT ENCOUNTER: Primary | ICD-10-CM

## 2024-04-29 PROCEDURE — 97530 THERAPEUTIC ACTIVITIES: CPT

## 2024-04-29 PROCEDURE — 97110 THERAPEUTIC EXERCISES: CPT

## 2024-04-29 NOTE — PROGRESS NOTES
Daily Note     Today's date: 2024  Patient name: Maira Colby  : 1955  MRN: [id number]  Referring provider: Syeda Snell PA-C  Dx:   Encounter Diagnosis     ICD-10-CM    1. Closed Colles' fracture of left radius with routine healing, subsequent encounter  S52.532D       2. Aftercare following surgery  Z48.89                    Subjective: Patient states she was able to do some cleaning with minimal discomfort.           Objective: See treatment diary below       Auth Tracker  Auth Status Total   Visits  Expiration date Co-Insurance   approved     approved                           Visit Tracker  Date 3/5 3/7 3/12 3/14 3/18 3/21 3/25 3/28    4/4 4/9  RE            x                                                                           PMHx:   has a past medical history of Anxiety, Cellulitis, Chronic rhinitis, Colon polyp, GERD without esophagitis, Hyperlipidemia, Hypometabolism, and Vitamin D deficiency.    Precautions: DR TORO sx 3/1/24       Manuals HEP 2024                        Ther Ex     Education on HEP and dx  x5min   AROM tendon glides x x10   AROM table top extension x x10   AROM digit add/abduction x    AROM wrist flex/ext/RD/UD  2x10   Therapist assisted PROM  X3 min    Scar massage and cupping  X3 min    pronator  X15 1 lbs    Digit ext band   X15 red   PREs wrist flex/ext/RD  X15 2 lbs         Prayer stretch   5 x 10 sec        Ther Act      Towel scrunch   x5   Flexbar all planes  X15 red        Wrist maze  X5   WB theraball  X10 x 5 sec   Gripper with sponges  Level 1                  Modalities     MHP  x5 min           Assessment:   Patient tolerated session well. Session focused on ROM and strengthening to improve deficits and functional performance with daily activities. Patient tolerated all TE/TA with no complaints. Patient progressing well towards goals. Patient benefiting from skilled hand therapy OT to reduce deficits to  improve independence with daily activities.        Plan:   Focus on ROM and strengthening to improve ability to complete daily activites with ease.  POC 4/9/24 - 6/14/24

## 2024-05-02 ENCOUNTER — OFFICE VISIT (OUTPATIENT)
Dept: OCCUPATIONAL THERAPY | Facility: CLINIC | Age: 69
End: 2024-05-02
Payer: COMMERCIAL

## 2024-05-02 DIAGNOSIS — S52.532D CLOSED COLLES' FRACTURE OF LEFT RADIUS WITH ROUTINE HEALING, SUBSEQUENT ENCOUNTER: Primary | ICD-10-CM

## 2024-05-02 PROCEDURE — 97530 THERAPEUTIC ACTIVITIES: CPT

## 2024-05-02 PROCEDURE — 97110 THERAPEUTIC EXERCISES: CPT

## 2024-05-02 NOTE — PROGRESS NOTES
Daily Note     Today's date: 2024  Patient name: Maira Colby  : 1955  MRN: [id number]  Referring provider: Syeda Snell PA-C  Dx:   Encounter Diagnosis     ICD-10-CM    1. Closed Colles' fracture of left radius with routine healing, subsequent encounter  S52.532D                    Subjective: Patient offers no functional changes.       Objective: See treatment diary below       Auth Tracker  Auth Status Total   Visits  Expiration date Co-Insurance   approved     approved                           Visit Tracker  Date 3/5 3/7 3/12 3/14 3/18 3/21 3/25 3/28    4/4 4/9  RE        x                                                                           PMHx:   has a past medical history of Anxiety, Cellulitis, Chronic rhinitis, Colon polyp, GERD without esophagitis, Hyperlipidemia, Hypometabolism, and Vitamin D deficiency.    Precautions: DR TORO sx 3/1/24       Manuals HEP 2024                        Ther Ex     Education on HEP and dx  x5min   AROM tendon glides x x10   AROM table top extension x x10   AROM digit add/abduction x    AROM wrist flex/ext/RD/UD  2x10   Therapist assisted PROM  X3 min    Scar massage and cupping  X3 min    pronator  X15 1 lbs    Digit ext band   X15 red   PREs wrist flex/ext/RD  X15 3lbs         Prayer stretch   5 x 10 sec        Ther Act      Towel scrunch   x5   Flexbar all planes  X15 red   Power web and twist  X10 yellow   Wrist maze  X5   WB theraball  X10 x 5 sec   Gripper with sponges  Level 1                  Modalities     MHP  x5 min           Assessment:   Patient tolerated session well. Session focused on ROM and strengthening to improve deficits and functional performance with daily activities. Patient tolerated all TE/TA with no complaints. Patient progressing well towards goals. Patient benefiting from skilled hand therapy OT to reduce deficits to improve independence with daily activities.        Plan:    Focus on ROM and strengthening to improve ability to complete daily activites with ease.  POC 4/9/24 - 6/14/24

## 2024-05-06 ENCOUNTER — OFFICE VISIT (OUTPATIENT)
Dept: OCCUPATIONAL THERAPY | Facility: CLINIC | Age: 69
End: 2024-05-06
Payer: COMMERCIAL

## 2024-05-06 DIAGNOSIS — S52.532D CLOSED COLLES' FRACTURE OF LEFT RADIUS WITH ROUTINE HEALING, SUBSEQUENT ENCOUNTER: Primary | ICD-10-CM

## 2024-05-06 PROCEDURE — 97530 THERAPEUTIC ACTIVITIES: CPT

## 2024-05-06 PROCEDURE — 97110 THERAPEUTIC EXERCISES: CPT

## 2024-05-06 NOTE — PROGRESS NOTES
Daily Note     Today's date: 2024  Patient name: Maira Colby  : 1955  MRN: [id number]  Referring provider: Syeda Snell PA-C  Dx:   Encounter Diagnosis     ICD-10-CM    1. Closed Colles' fracture of left radius with routine healing, subsequent encounter  S52.532D                    Subjective: Patient offers no functional changes.       Objective: See treatment diary below       Auth Tracker  Auth Status Total   Visits  Expiration date Co-Insurance   approved     approved                           Visit Tracker  Date 3/5 3/7 3/12 3/14 3/18 3/21 3/25 3/28    4/4 4/9  RE       x                                                                           PMHx:   has a past medical history of Anxiety, Cellulitis, Chronic rhinitis, Colon polyp, GERD without esophagitis, Hyperlipidemia, Hypometabolism, and Vitamin D deficiency.    Precautions: DR TORO sx 3/1/24       Manuals HEP 2024                        Ther Ex     Education on HEP and dx  x5min   AROM tendon glides x x10   AROM table top extension x x10   AROM digit add/abduction x    AROM wrist flex/ext/RD/UD  2x10   Therapist assisted PROM  X3 min    Scar massage and cupping  X3 min    pronator  X15 1 lbs    Digit ext band   X15 red   PREs wrist flex/ext/RD  X15 3lbs         Prayer stretch   5 x 10 sec        Ther Act      Towel scrunch   x5   Flexbar all planes  X15 red   Power web and twist  X10 yellow   Wrist maze  X5   WB theraball  X10 x 5 sec   Gripper with sponges  Level 1                  Modalities     MHP  x5 min           Assessment:   Patient tolerated session well. Session focused on ROM and strengthening to improve deficits and functional performance with daily activities. Patient tolerated all TE/TA with no complaints. Patient progressing well towards goals. Patient benefiting from skilled hand therapy OT to reduce deficits to improve independence with daily activities.        Plan:    Focus on ROM and strengthening to improve ability to complete daily activites with ease.  POC 4/9/24 - 6/14/24

## 2024-05-09 ENCOUNTER — OFFICE VISIT (OUTPATIENT)
Dept: OCCUPATIONAL THERAPY | Facility: CLINIC | Age: 69
End: 2024-05-09
Payer: COMMERCIAL

## 2024-05-09 DIAGNOSIS — Z48.89 AFTERCARE FOLLOWING SURGERY: ICD-10-CM

## 2024-05-09 DIAGNOSIS — S52.572D CLOSED INTRA-ARTICULAR DIE-PUNCH FRACTURE OF LEFT RADIUS WITH ROUTINE HEALING, SUBSEQUENT ENCOUNTER: ICD-10-CM

## 2024-05-09 DIAGNOSIS — S52.532D CLOSED COLLES' FRACTURE OF LEFT RADIUS WITH ROUTINE HEALING, SUBSEQUENT ENCOUNTER: Primary | ICD-10-CM

## 2024-05-09 DIAGNOSIS — G56.02 LEFT CARPAL TUNNEL SYNDROME: ICD-10-CM

## 2024-05-09 PROCEDURE — 97530 THERAPEUTIC ACTIVITIES: CPT

## 2024-05-09 PROCEDURE — 97110 THERAPEUTIC EXERCISES: CPT

## 2024-05-09 NOTE — PROGRESS NOTES
Daily Note     Today's date: 2024  Patient name: Maira Colby  : 1955  MRN: [id number]  Referring provider: Syeda Snell PA-C  Dx:   Encounter Diagnosis     ICD-10-CM    1. Closed Colles' fracture of left radius with routine healing, subsequent encounter  S52.532D       2. Aftercare following surgery  Z48.89       3. Left carpal tunnel syndrome  G56.02       4. Closed intra-articular die-punch fracture of left radius with routine healing, subsequent encounter  S52.572D                  Subjective: Patient states increased soreness at start of session.         Objective: See treatment diary below       Auth Tracker  Auth Status Total   Visits  Expiration date Co-Insurance   approved     approved                           Visit Tracker  Date 3/5 3/7 3/12 3/14 3/18 3/21 3/25 3/28    4/4 4/9  RE      x                                                                           PMHx:   has a past medical history of Anxiety, Cellulitis, Chronic rhinitis, Colon polyp, GERD without esophagitis, Hyperlipidemia, Hypometabolism, and Vitamin D deficiency.    Precautions: DR TORO sx 3/1/24       Manuals HEP 2024                        Ther Ex     Education on HEP and dx  x5min   AROM tendon glides x x10   AROM table top extension x x10   AROM digit add/abduction x    AROM wrist flex/ext/RD/UD  2x10   Therapist assisted PROM  X3 min    Scar massage and cupping  X3 min    pronator  X15 1 lbs    Digit ext band   X15 red   PREs wrist flex/ext/RD  X15 2lbs         Prayer stretch   5 x 10 sec        Ther Act      Towel scrunch   x5   Flexbar all planes  X15 red   Power web and twist  X10 yellow   Wrist maze  X5   WB theraball  X10 x 5 sec   Gripper with sponges  Level 1                  Modalities     MHP  x5 min           Assessment:   Patient tolerated session well. Session focused on ROM and strengthening to improve deficits and functional performance with  daily activities. Patient tolerated all TE/TA with no complaints. Patient progressing well towards goals. Patient benefiting from skilled hand therapy OT to reduce deficits to improve independence with daily activities.        Plan:   Focus on ROM and strengthening to improve ability to complete daily activites with ease.  POC 4/9/24 - 6/14/24

## 2024-05-13 ENCOUNTER — OFFICE VISIT (OUTPATIENT)
Dept: OCCUPATIONAL THERAPY | Facility: CLINIC | Age: 69
End: 2024-05-13
Payer: COMMERCIAL

## 2024-05-13 DIAGNOSIS — S52.532D CLOSED COLLES' FRACTURE OF LEFT RADIUS WITH ROUTINE HEALING, SUBSEQUENT ENCOUNTER: Primary | ICD-10-CM

## 2024-05-13 PROCEDURE — 97530 THERAPEUTIC ACTIVITIES: CPT

## 2024-05-13 PROCEDURE — 97110 THERAPEUTIC EXERCISES: CPT

## 2024-05-13 NOTE — PROGRESS NOTES
Daily Note     Today's date: 2024  Patient name: Maira Colby  : 1955  MRN: [id number]  Referring provider: Syeda Snell PA-C  Dx:   Encounter Diagnosis     ICD-10-CM    1. Closed Colles' fracture of left radius with routine healing, subsequent encounter  S52.532D                  Subjective: Patient states increased soreness at start of session.         Objective: See treatment diary below       Auth Tracker  Auth Status Total   Visits  Expiration date Co-Insurance   approved     approved                           Visit Tracker  Date 3/5 3/7 3/12 3/14 3/18 3/21 3/25 3/28    4/4 4/9  RE      x                                                                           PMHx:   has a past medical history of Anxiety, Cellulitis, Chronic rhinitis, Colon polyp, GERD without esophagitis, Hyperlipidemia, Hypometabolism, and Vitamin D deficiency.    Precautions: DR TORO sx 3/1/24       Manuals HEP 2024                        Ther Ex     Education on HEP and dx  x5min   AROM tendon glides x x10   AROM table top extension x x10   AROM digit add/abduction x    AROM wrist flex/ext/RD/UD  2x10   Therapist assisted PROM  X3 min    Scar massage and cupping  X3 min    pronator  X15 1 lbs    Digit ext band   X15 red   PREs wrist flex/ext/RD  X15 3lbs         Prayer stretch   5 x 10 sec        Ther Act      Towel scrunch   x5   Flexbar all planes  X15 red   Power web and twist  X10 yellow   Wrist maze  X5   WB theraball  X10 x 5 sec   Gripper with sponges  Level 3                  Modalities     MHP  x5 min           Assessment:   Patient tolerated session well. Session focused on ROM and strengthening to improve deficits and functional performance with daily activities. Patient tolerated all TE/TA with no complaints. Patient progressing well towards goals. Patient benefiting from skilled hand therapy OT to reduce deficits to improve independence with daily  activities.        Plan:   Focus on ROM and strengthening to improve ability to complete daily activites with ease.  POC 4/9/24 - 6/14/24

## 2024-05-16 ENCOUNTER — OFFICE VISIT (OUTPATIENT)
Dept: OCCUPATIONAL THERAPY | Facility: CLINIC | Age: 69
End: 2024-05-16
Payer: COMMERCIAL

## 2024-05-16 DIAGNOSIS — G56.02 LEFT CARPAL TUNNEL SYNDROME: ICD-10-CM

## 2024-05-16 DIAGNOSIS — S52.532D CLOSED COLLES' FRACTURE OF LEFT RADIUS WITH ROUTINE HEALING, SUBSEQUENT ENCOUNTER: Primary | ICD-10-CM

## 2024-05-16 DIAGNOSIS — Z48.89 AFTERCARE FOLLOWING SURGERY: ICD-10-CM

## 2024-05-16 DIAGNOSIS — S52.572D CLOSED INTRA-ARTICULAR DIE-PUNCH FRACTURE OF LEFT RADIUS WITH ROUTINE HEALING, SUBSEQUENT ENCOUNTER: ICD-10-CM

## 2024-05-16 PROCEDURE — 97530 THERAPEUTIC ACTIVITIES: CPT

## 2024-05-16 NOTE — PROGRESS NOTES
Daily Note     Today's date: 2024  Patient name: Maira Colby  : 1955  MRN: [id number]  Referring provider: Syeda Snell PA-C  Dx:   Encounter Diagnosis     ICD-10-CM    1. Closed Colles' fracture of left radius with routine healing, subsequent encounter  S52.532D       2. Aftercare following surgery  Z48.89       3. Left carpal tunnel syndrome  G56.02       4. Closed intra-articular die-punch fracture of left radius with routine healing, subsequent encounter  S52.572D                    Subjective: Patient states increased soreness at start of session.         Objective: See treatment diary below       Auth Tracker  Auth Status Total   Visits  Expiration date Co-Insurance   approved     approved                           Visit Tracker  Date 3/5 3/7 3/12 3/14 3/18 3/21 3/25 3/28    4/4 4/9  RE     x                                                                           PMHx:   has a past medical history of Anxiety, Cellulitis, Chronic rhinitis, Colon polyp, GERD without esophagitis, Hyperlipidemia, Hypometabolism, and Vitamin D deficiency.    Precautions: DR TORO sx 3/1/24       Manuals HEP 2024                        Ther Ex     Education on HEP and dx  x5min   AROM tendon glides x x10   AROM table top extension x x10   AROM digit add/abduction x    AROM wrist flex/ext/RD/UD  2x10   Therapist assisted PROM  X3 min    Scar massage and cupping  X3 min    pronator  X15 1 lbs    Digit ext band   X15 red   PREs wrist flex/ext/RD  X15 3lbs         Prayer stretch   5 x 10 sec        Ther Act      Towel scrunch   x5   Flexbar all planes  X15 red   Power web and twist  X10 yellow   Wrist maze  X5   WB theraball  X10 x 5 sec   Gripper with sponges  Level 3                  Modalities     MHP  x5 min           Assessment:   Patient tolerated session well. Session focused on ROM and strengthening to improve deficits and functional performance  with daily activities. Patient tolerated all TE/TA with no complaints. Patient progressing well towards goals. Patient benefiting from skilled hand therapy OT to reduce deficits to improve independence with daily activities.        Plan:   Focus on ROM and strengthening to improve ability to complete daily activites with ease.  POC 4/9/24 - 6/14/24

## 2024-05-20 ENCOUNTER — OFFICE VISIT (OUTPATIENT)
Dept: OCCUPATIONAL THERAPY | Facility: CLINIC | Age: 69
End: 2024-05-20
Payer: COMMERCIAL

## 2024-05-20 DIAGNOSIS — S52.532D CLOSED COLLES' FRACTURE OF LEFT RADIUS WITH ROUTINE HEALING, SUBSEQUENT ENCOUNTER: Primary | ICD-10-CM

## 2024-05-20 PROCEDURE — 97530 THERAPEUTIC ACTIVITIES: CPT

## 2024-05-20 PROCEDURE — 97110 THERAPEUTIC EXERCISES: CPT

## 2024-05-20 NOTE — PROGRESS NOTES
Daily Note     Today's date: 2024  Patient name: Maira Colby  : 1955  MRN: [id number]  Referring provider: Syeda Snell PA-C  Dx:   Encounter Diagnosis     ICD-10-CM    1. Closed Colles' fracture of left radius with routine healing, subsequent encounter  S52.532D                    Subjective: Patient offers no functional changes this date.         Objective: See treatment diary below       Auth Tracker  Auth Status Total   Visits  Expiration date Co-Insurance   approved     approved                           Visit Tracker  Date 3/5 3/7 3/12 3/14 3/18 3/21 3/25 3/28    4/4 4/9  RE    x                                                                           PMHx:   has a past medical history of Anxiety, Cellulitis, Chronic rhinitis, Colon polyp, GERD without esophagitis, Hyperlipidemia, Hypometabolism, and Vitamin D deficiency.    Precautions: DR TORO sx 3/1/24       Manuals HEP 2024                        Ther Ex     Education on HEP and dx  x5min   AROM tendon glides x x10   AROM table top extension x x10   AROM digit add/abduction x    AROM wrist flex/ext/RD/UD  2x10   Therapist assisted PROM  X3 min    Scar massage and cupping  X3 min    pronator  X15 1 lbs    Digit ext band   X15 red   PREs wrist flex/ext/RD  X15 4lbs         Prayer stretch   5 x 10 sec        Ther Act      Towel scrunch   x5   Flexbar all planes  X15 green   Power web and twist  X10 green        WB theraball  X10 x 5 sec   Gripper with sponges  Level 3                  Modalities     MHP  x5 min           Assessment:   Patient tolerated session well. Session focused on ROM and strengthening to improve deficits and functional performance with daily activities. Patient tolerated all TE/TA with no complaints. Patient progressing well towards goals. Patient benefiting from skilled hand therapy OT to reduce deficits to improve independence with daily  activities.        Plan:   Focus on ROM and strengthening to improve ability to complete daily activites with ease.  POC 4/9/24 - 6/14/24

## 2024-05-21 ENCOUNTER — HOSPITAL ENCOUNTER (OUTPATIENT)
Dept: MRI IMAGING | Facility: HOSPITAL | Age: 69
Discharge: HOME | End: 2024-05-21
Attending: NEUROLOGICAL SURGERY
Payer: COMMERCIAL

## 2024-05-21 DIAGNOSIS — Z48.89 AFTERCARE FOLLOWING SURGERY: ICD-10-CM

## 2024-05-21 DIAGNOSIS — D32.9 BENIGN NEOPLASM OF MENINGES (HCC): ICD-10-CM

## 2024-05-21 DIAGNOSIS — Z09 POSTOPERATIVE EXAMINATION: ICD-10-CM

## 2024-05-21 PROCEDURE — 70553 MRI BRAIN STEM W/O & W/DYE: CPT

## 2024-05-21 PROCEDURE — A9585 GADOBUTROL INJECTION: HCPCS | Performed by: NEUROLOGICAL SURGERY

## 2024-05-21 PROCEDURE — G1004 CDSM NDSC: HCPCS

## 2024-05-21 RX ORDER — GADOBUTROL 604.72 MG/ML
8 INJECTION INTRAVENOUS
Status: COMPLETED | OUTPATIENT
Start: 2024-05-21 | End: 2024-05-21

## 2024-05-21 RX ADMIN — GADOBUTROL 8 ML: 604.72 INJECTION INTRAVENOUS at 16:09

## 2024-05-23 ENCOUNTER — OFFICE VISIT (OUTPATIENT)
Dept: NEUROSURGERY | Facility: CLINIC | Age: 69
End: 2024-05-23
Payer: COMMERCIAL

## 2024-05-23 ENCOUNTER — TELEPHONE (OUTPATIENT)
Dept: NEUROSURGERY | Facility: CLINIC | Age: 69
End: 2024-05-23

## 2024-05-23 ENCOUNTER — OFFICE VISIT (OUTPATIENT)
Dept: OCCUPATIONAL THERAPY | Facility: CLINIC | Age: 69
End: 2024-05-23
Payer: COMMERCIAL

## 2024-05-23 VITALS
WEIGHT: 180 LBS | OXYGEN SATURATION: 98 % | DIASTOLIC BLOOD PRESSURE: 76 MMHG | TEMPERATURE: 97.6 F | HEART RATE: 78 BPM | SYSTOLIC BLOOD PRESSURE: 124 MMHG | BODY MASS INDEX: 30.73 KG/M2 | RESPIRATION RATE: 18 BRPM | HEIGHT: 64 IN

## 2024-05-23 DIAGNOSIS — D32.9 MENINGIOMA (HCC): Primary | ICD-10-CM

## 2024-05-23 DIAGNOSIS — S52.532D CLOSED COLLES' FRACTURE OF LEFT RADIUS WITH ROUTINE HEALING, SUBSEQUENT ENCOUNTER: Primary | ICD-10-CM

## 2024-05-23 PROCEDURE — 99213 OFFICE O/P EST LOW 20 MIN: CPT | Performed by: NEUROLOGICAL SURGERY

## 2024-05-23 PROCEDURE — 97530 THERAPEUTIC ACTIVITIES: CPT

## 2024-05-23 PROCEDURE — 97110 THERAPEUTIC EXERCISES: CPT

## 2024-05-23 NOTE — PROGRESS NOTES
Daily Note     Today's date: 2024  Patient name: Maira Colby  : 1955  MRN: [id number]  Referring provider: Syeda Snell PA-C  Dx:   Encounter Diagnosis     ICD-10-CM    1. Closed Colles' fracture of left radius with routine healing, subsequent encounter  S52.532D                    Subjective: Patient offers no functional changes this date.         Objective: See treatment diary below       Auth Tracker  Auth Status Total   Visits  Expiration date Co-Insurance   approved     approved                           Visit Tracker  Date 3/5 3/7 3/12 3/14 3/18 3/21 3/25 3/28    4/4 4/9  RE   x                                                                           PMHx:   has a past medical history of Anxiety, Cellulitis, Chronic rhinitis, Colon polyp, GERD without esophagitis, Hyperlipidemia, Hypometabolism, and Vitamin D deficiency.    Precautions: DR TORO sx 3/1/24       Manuals HEP 2024                        Ther Ex     Education on HEP and dx  x5min   AROM tendon glides x x10   AROM table top extension x x10   AROM digit add/abduction x    AROM wrist flex/ext/RD/UD  2x10   Therapist assisted PROM  X3 min    Scar massage and cupping  X3 min    pronator  X15 1 lbs    Digit ext band   X15 red   PREs wrist flex/ext/RD  X15 4lbs         Prayer stretch   5 x 10 sec        Ther Act      Towel scrunch   x5   Flexbar all planes  X15 green   Power web and twist  X10 green        WB theraball  X10 x 5 sec   Gripper with sponges  Level 3                  Modalities     MHP  x5 min           Assessment:   Patient tolerated session well. Session focused on ROM and strengthening to improve deficits and functional performance with daily activities. Patient tolerated all TE/TA with no complaints. Patient progressing well towards goals. Patient benefiting from skilled hand therapy OT to reduce deficits to improve independence with daily  activities.        Plan:   Focus on ROM and strengthening to improve ability to complete daily activites with ease.  POC 4/9/24 - 6/14/24

## 2024-05-23 NOTE — PROGRESS NOTES
"DISCUSSION SUMMARY   This is a 69 y.o. female status post a craniotomy for resection of a meningioma.  Her current studies demonstrate the prosthetic but my suspicion for recurrence of tumor is exceedingly low.  I recommended that she continue to be followed up and we will see her back in 1 year interval.    Return in about 1 year (around 5/23/2025) for follow-up after test.      Diagnosis ICD-10-CM Associated Orders   1. Meningioma (HCC)  D32.9 MRI brain with and without contrast           Chief Complaint   Patient presents with    Follow-up     MRI BRAIN 5/21/24        HPI denies headaches  No problems with the incision  Has had no seizures  She did have 1 fall because of an irregularity on the surface which could have happened to anyone.  She did break her wrist however she did not strike her head.  She had no loss of consciousness.  She went through surgical repair of her wrist.  She has some ulnar nerve sensory changes distal to the wrist.  She has had no changes in her sleep-wake cycles.    Review of Systems   Constitutional:  Negative for fatigue.   HENT:  Negative for tinnitus.    Eyes: Negative.  Negative for visual disturbance.   Respiratory: Negative.     Cardiovascular: Negative.    Gastrointestinal: Negative.    Endocrine: Negative.    Genitourinary: Negative.    Musculoskeletal: Negative.    Skin: Negative.    Allergic/Immunologic: Negative.    Neurological:  Negative for dizziness, speech difficulty, weakness, light-headedness, numbness and headaches.   Hematological: Negative.    Psychiatric/Behavioral:  Negative for sleep disturbance.    I reviewed the ROS.    Vitals:    /76 (BP Location: Left arm, Patient Position: Sitting, Cuff Size: Adult)   Pulse 78   Temp 97.6 °F (36.4 °C) (Temporal)   Resp 18   Ht 5' 4\" (1.626 m)   Wt 81.6 kg (180 lb)   SpO2 98%   BMI 30.90 kg/m²     MEDICAL HISTORY  Past Medical History:   Diagnosis Date    Anxiety     Cellulitis     Chronic rhinitis     Last " Assessed:2016    Colon polyp     GERD without esophagitis     Last Assessed:2016    Hyperlipidemia     Hypometabolism     Vitamin D deficiency      Past Surgical History:   Procedure Laterality Date    COLONOSCOPY      W[place] CRNEC TREPHINE BONE FLAP MENINGIOMA SUPRATENTOR Left 2023    Procedure: Left frontal CRANIOTOMY IMAGE-GUIDED FOR TUMOR;  Surgeon: Sigifredo Sagastume MD;  [facility]: [facility];  Service: Neurosurgery    [place] NEUROPLASTY &/TRANSPOS MEDIAN NRV CARPAL TUNNE Left 3/1/2024    Procedure: RELEASE CARPAL TUNNEL;  Surgeon: Man Durant MD;  Location: [facility];  Service: Orthopedics    [place] OPTX DSTL RADL I-ARTIC FX/EPIPHYSL SEP 3 FRAG Left 3/1/2024    Procedure: OPEN REDUCTION W/ INTERNAL FIXATION (ORIF) RADIUS / ULNA (WRIST);  Surgeon: Man Durant MD;  Location: [facility];  Service: Orthopedics    TONSILLECTOMY       Social History     Tobacco Use    Smoking status: Former     Current packs/day: 0.00     Average packs/day: 1.5 packs/day for 30.0 years (45.1 ttl pk-yrs)     Types: Cigarettes     Start date: 6/15/1987     Quit date: 2008     Years since quittin.4     Passive exposure: Past    Smokeless tobacco: Never   Vaping Use    Vaping status: Never Used   Substance Use Topics    Alcohol use: Yes     Comment: social/anxiety    Drug use: Not Currently     Types: Marijuana     Comment: Previously      Family History   Problem Relation Age of Onset    Atrial fibrillation Mother     Coronary artery disease Mother     Atrial fibrillation Father     Hypertension Father     Breast cancer Maternal Grandmother 60        Current Outpatient Medications:     amLODIPine (NORVASC) 2.5 mg tablet, TAKE 1 TABLET DAILY, Disp: 90 tablet, Rfl: 0    Ascorbic Acid (VITAMIN C) 100 MG tablet, Take 100 mg by mouth daily, Disp: , Rfl:     atorvastatin (LIPITOR) 10 mg tablet, TAKE 1 TABLET DAILY, Disp: 90 tablet, Rfl: 1    estradiol (ESTRACE) 0.1 mg/g vaginal cream, Insert into the vagina  Takes weekly on Monday evenings, Disp: , Rfl:     fluticasone (FLONASE) 50 mcg/act nasal spray, 1 spray into each nostril daily, Disp: 48 g, Rfl: 1    polyethylene glycol (MIRALAX) 17 g packet, Take 17 g by mouth daily as needed (constipation), Disp: , Rfl: 0    acetaminophen (TYLENOL) 325 mg tablet, Take 3 tablets (975 mg total) by mouth every 8 (eight) hours as needed for mild pain (Patient not taking: Reported on 2/28/2024), Disp: , Rfl: 0     Allergies   Allergen Reactions    Sulfa Antibiotics Edema    Monascus Purpureus Went Yeast Other (See Comments)     Doesn't remember    Other Rash    Penicillins Rash        The following portions of the patient's history were updated by MA and reviewed by MD: allergies, current medications, past family history, past medical history, past social history, past surgical history, and problem list.    Physical Exam  Vitals and nursing note reviewed.   Constitutional:       General: She is not in acute distress.     Appearance: Normal appearance. She is not ill-appearing, toxic-appearing or diaphoretic.   HENT:      Head: Normocephalic.      Nose: Nose normal.   Eyes:      Extraocular Movements: Extraocular movements intact.      Pupils: Pupils are equal, round, and reactive to light.   Musculoskeletal:         General: No swelling, tenderness, deformity or signs of injury. Normal range of motion.      Cervical back: Normal range of motion and neck supple. No rigidity.      Right lower leg: No edema.      Left lower leg: No edema.   Lymphadenopathy:      Cervical: No cervical adenopathy.   Skin:     General: Skin is warm and dry.      Coloration: Skin is not jaundiced.      Findings: No erythema or rash.   Neurological:      General: No focal deficit present.      Mental Status: She is alert and oriented to person, place, and time.      Cranial Nerves: No cranial nerve deficit.      Sensory: No sensory deficit.      Motor: No weakness.      Gait: Gait normal.   Psychiatric:          Mood and Affect: Mood normal.         Behavior: Behavior normal.         Thought Content: Thought content normal.         Judgment: Judgment normal.     RESULTS/DATA    An MRI of the brain is carefully reviewed.  This demonstrates enhancement along the course of the dural patch without evidence of mely tumor recurrence.  This will need to be followed closely but my suspicion for recurrence at this interval is low based on the study.

## 2024-05-28 ENCOUNTER — OFFICE VISIT (OUTPATIENT)
Dept: OCCUPATIONAL THERAPY | Facility: CLINIC | Age: 69
End: 2024-05-28
Payer: COMMERCIAL

## 2024-05-28 DIAGNOSIS — S52.532D CLOSED COLLES' FRACTURE OF LEFT RADIUS WITH ROUTINE HEALING, SUBSEQUENT ENCOUNTER: Primary | ICD-10-CM

## 2024-05-28 PROCEDURE — 97110 THERAPEUTIC EXERCISES: CPT

## 2024-05-28 PROCEDURE — 97530 THERAPEUTIC ACTIVITIES: CPT

## 2024-05-28 NOTE — PROGRESS NOTES
Re-Evaluation Note     Today's date: 2024  Patient name: Maira Colby  : 1955  MRN: [id number]  Referring provider: Syeda Snell PA-C  Dx:   Encounter Diagnosis     ICD-10-CM    1. Closed Colles' fracture of left radius with routine healing, subsequent encounter  S52.532D                    Assessment  Assessment details:   Patient re-evaluation done this this date with noted improvement in ROM and strength. Patient strength also assessed this date with noted deficits in LUE compared to RUE at this time. Patient would benefit from continued skilled OT to maximize ROM and strength at this time to improve functional use with ADLs.       Patient is a 68 y.o. female who presents for OT IE and treatment for Left DR ORIF and CTR done on 3/1/24 by Dr. Durant. Patient reports falling on 24 while walking by her home. Patient referred by Dr. Durant to initiate treatment including hand therapy.    Impairments: abnormal or restricted ROM, impaired physical strength, lacks appropriate home exercise program and pain with function  Understanding of Dx/Px/POC: good   Prognosis: good     Goals  Short term goals 2-4 weeks  Establish HEP to enhance performance with ADLs. met     Improve active range of motion of LUE by 20  to assist with UE dressing. MET     Achieve composite digital flexion to touch distal jackson crease for grasp on utensils. MET     In crease  strength by 10 lbs. to enhance gripping hand tools. progressing           Long Term goals by discharge  Establish final home exercise program to enhance maximal functional level with ADLs. progressing     Achieve functional active range of motion of LUE for full return to household chores. progressing                            Achieve functional strength of LUE for full return to high level ADLs. progressing              Plan  Patient would benefit from: OT eval, skilled occupational therapy, orthotics and custom splinting  Planned modality  interventions: thermotherapy: hydrocollator packs, cryotherapy, TENS, unattended electrical stimulation, ultrasound and electrical stimulation/Russian stimulation  Planned therapy interventions: manual therapy, joint mobilization, strengthening, stretching, therapeutic activities, therapeutic exercise, home exercise program, graded exercise, graded activity, functional ROM exercises, flexibility, orthotic fitting/training, IASTM, kinesiology taping and massage  Frequency: 2x week  Duration in weeks: 6  Plan of Care beginning date: 24  Plan of Care expiration date: 24  Treatment plan discussed with: patient        Subjective Evaluation     History of Present Illness  Date of onset: 2024  Date of surgery: 3/1/2024  Mechanism of injury: surgery  Mechanism of injury: Patient is a 68 y.o. RHD female who presents for OT IE and treatment for Left DR ORIF and CTR done on 3/1/24 by Dr. Durant. Patient reports falling on 24 while walking by her home. Patient referred by Dr. Durant to initiate treatment including hand therapy and splint post operative.    Pain  Current pain ratin  At best pain ratin  At worst pain ratin           Objective      Observations      Left Wrist/Hand   Positive for incision.      Additional Observation Details  Patient has volar longitudinal incision present on LUE sutures in place and steri strips over top. Incision is CDI at this time. Patient instructed on signs and symptoms of infection - redness, pain, drainage, fever, malaise.  Instructed to call physician if any signs of infection present.  If physician is not available, advised to go to [facility] for immediate consult.          Active Range of Motion      Left Elbow   Forearm supination: 65 degrees   Forearm pronation: 85 degrees      Right Elbow   Forearm supination: 70 degrees   Forearm pronation: 85 degrees      Left Wrist   Wrist flexion: 55 degrees   Wrist extension: 50 degrees   Radial  deviation: 17 degrees   Ulnar deviation: 25 degrees      Right Wrist   Wrist flexion: 65 degrees   Wrist extension: 70 degrees   Radial deviation: 17 degrees   Ulnar deviation: 25 degrees      Left Thumb   Kapandji score: 10      Opposition: Patient able to oppose to long finger at this time.      Right Thumb   Opposition: WNL  Kapandji score: 10 degrees     strength Right - 52      strength Left - 31     Additional Active Range of Motion Details  Patient demonstrating difficulty with ulnar nerve distribution pain at this time.                  Daily Note     Today's date: 2024  Patient name: Maira Colby  : 1955  MRN: [id number]  Referring provider: Syeda Snell PA-C  Dx:   Encounter Diagnosis     ICD-10-CM    1. Closed Colles' fracture of left radius with routine healing, subsequent encounter  S52.532D                    Subjective: Patient offers no functional changes this date.         Objective: See treatment diary below       Auth Tracker  Auth Status Total   Visits  Expiration date Co-Insurance   approved     approved                           Visit Tracker  Date 3/5 3/7 3/12 3/14 3/18 3/21 3/25 3/28    4/4 4/9  RE   RE x                                                                           PMHx:   has a past medical history of Anxiety, Cellulitis, Chronic rhinitis, Colon polyp, GERD without esophagitis, Hyperlipidemia, Hypometabolism, and Vitamin D deficiency.    Precautions: DR TORO sx 3/1/24       Manuals HEP 2024                        Ther Ex     Education on HEP and dx  x5min   AROM tendon glides x x10   AROM table top extension x x10   AROM digit add/abduction x    AROM wrist flex/ext/RD/UD  2x10   Therapist assisted PROM  X3 min    Scar massage and cupping  X3 min    pronator  X15 1 lbs    Digit ext band   X15 red   PREs wrist flex/ext/RD  X15 4lbs         Prayer stretch   5 x 10 sec         Ther Act      Towel scrunch   x5   Flexbar all planes  X15 green   Power web and twist  X10 green        WB theraball  X10 x 5 sec   Gripper with sponges  Level 3                  Modalities     MHP  x5 min           Assessment:   Patient tolerated session well. Session focused on ROM and strengthening to improve deficits and functional performance with daily activities. Patient tolerated all TE/TA with no complaints. Patient progressing well towards goals. Patient benefiting from skilled hand therapy OT to reduce deficits to improve independence with daily activities.        Plan:   Focus on ROM and strengthening to improve ability to complete daily activites with ease.  POC 5/28/24 - 7/9/24

## 2024-05-30 ENCOUNTER — OFFICE VISIT (OUTPATIENT)
Dept: OCCUPATIONAL THERAPY | Facility: CLINIC | Age: 69
End: 2024-05-30
Payer: COMMERCIAL

## 2024-05-30 DIAGNOSIS — S52.532D CLOSED COLLES' FRACTURE OF LEFT RADIUS WITH ROUTINE HEALING, SUBSEQUENT ENCOUNTER: Primary | ICD-10-CM

## 2024-05-30 PROCEDURE — 97110 THERAPEUTIC EXERCISES: CPT

## 2024-05-30 PROCEDURE — 97530 THERAPEUTIC ACTIVITIES: CPT

## 2024-05-30 NOTE — PROGRESS NOTES
Daily Note     Today's date: 2024  Patient name: Maira Colby  : 1955  MRN: [id number]  Referring provider: Syeda Snell PA-C  Dx:   Encounter Diagnosis     ICD-10-CM    1. Closed Colles' fracture of left radius with routine healing, subsequent encounter  S52.532D                    Subjective: Patient offers no functional changes this date.         Objective: See treatment diary below       Auth Tracker  Auth Status Total   Visits  Expiration date Co-Insurance   approved     approved                           Visit Tracker  Date 3/5 3/7 3/12 3/14 3/18 3/21 3/25 3/28    4/4 4/9  RE   RE                                                                            PMHx:   has a past medical history of Anxiety, Cellulitis, Chronic rhinitis, Colon polyp, GERD without esophagitis, Hyperlipidemia, Hypometabolism, and Vitamin D deficiency.    Precautions: DR TORO sx 3/1/24       Manuals HEP 2024                         Ther Ex     Education on HEP and dx  x5min   AROM tendon glides x x10   AROM table top extension x x10   AROM digit add/abduction x    AROM wrist flex/ext/RD/UD  2x10   Therapist assisted PROM  X3 min    Scar massage and cupping  X3 min    pronator  X15 1 lbs    Digit ext band   X15 red   PREs wrist flex/ext/RD  X15 4lbs         Prayer stretch   5 x 10 sec        Ther Act      Towel scrunch   x5   Flexbar all planes  X15 green   Power web and twist  X10 green        WB theraball  X10 x 5 sec   Gripper with sponges  Level 3                  Modalities     MHP  x5 min           Assessment:   Patient tolerated session well. Session focused on ROM and strengthening to improve deficits and functional performance with daily activities. Patient tolerated all TE/TA with no complaints. Patient progressing well towards goals. Patient benefiting from skilled hand therapy OT to reduce deficits to improve independence with  daily activities.        Plan:   Focus on ROM and strengthening to improve ability to complete daily activites with ease.  POC 5/28/24 - 7/9/24

## 2024-06-05 ENCOUNTER — OFFICE VISIT (OUTPATIENT)
Dept: OBGYN CLINIC | Facility: CLINIC | Age: 69
End: 2024-06-05
Payer: COMMERCIAL

## 2024-06-05 VITALS — BODY MASS INDEX: 30.73 KG/M2 | HEIGHT: 64 IN | WEIGHT: 180 LBS

## 2024-06-05 DIAGNOSIS — S52.532D CLOSED COLLES' FRACTURE OF LEFT RADIUS WITH ROUTINE HEALING, SUBSEQUENT ENCOUNTER: Primary | ICD-10-CM

## 2024-06-05 PROCEDURE — 99213 OFFICE O/P EST LOW 20 MIN: CPT | Performed by: SURGERY

## 2024-06-05 NOTE — PROGRESS NOTES
ASSESSMENT/PLAN:      66-year-old female with ORIF left distal radius and carpal tunnel release, date of surgery 3/1/2024.  Patient continues to work and progress with her range of motion.  Encouraged the patient to continue her therapy and transition to home exercise program.  She should use heat prior to any of her stretches to maximize her range of motion.  We will follow-up with the patient in 3 months for reevaluation.    The patient verbalized understanding of exam findings and treatment plan. We engaged in the shared decision-making process and treatment options were discussed at length with the patient. Surgical and conservative management discussed today along with risks and benefits.    Diagnoses and all orders for this visit:    Closed Colles' fracture of left radius with routine healing, subsequent encounter        Follow Up:  Return in about 3 months (around 9/5/2024) for left wrist.      To Do Next Visit:  Re-evaluation of current issue    ____________________________________________________________________________________________________________________________________________      CHIEF COMPLAINT:  Chief Complaint   Patient presents with    Follow-up     Follow up on the left wrist.       SUBJECTIVE:TYRONE Luubhavna Colby is a 69 y.o. year old RHD female who presents today for a range of motion check for her left wrist. She is 3 months s/p ORIF left distal radius and carpal tunnel release, 3/1/2024. She has been in OT working on her ROM and strength. She feels that she is stiff today.        I have personally reviewed all the relevant PMH, PSH, SH, FH, Medications and allergies.     PAST MEDICAL HISTORY:  Past Medical History:   Diagnosis Date    Anxiety     Cellulitis     Chronic rhinitis     Last Assessed:6/6/2016    Colon polyp     GERD without esophagitis     Last Assessed:6/6/2016    Hyperlipidemia     Hypometabolism     Vitamin D deficiency        PAST SURGICAL HISTORY:  Past Surgical History:    Procedure Laterality Date    COLONOSCOPY      SD CRNEC TREPHINE BONE FLAP MENINGIOMA SUPRATENTOR Left 2023    Procedure: Left frontal CRANIOTOMY IMAGE-GUIDED FOR TUMOR;  Surgeon: Sigifredo Sagastume MD;  [facility];  Service: Neurosurgery    SD NEUROPLASTY &/TRANSPOS MEDIAN NRV CARPAL TUNNE Left 3/1/2024    Procedure: RELEASE CARPAL TUNNEL;  Surgeon: Man Durant MD;  Location: [facility];  Service: Orthopedics    SD OPTX DSTL RADL I-ARTIC FX/EPIPHYSL SEP 3 FRAG Left 3/1/2024    Procedure: OPEN REDUCTION W/ INTERNAL FIXATION (ORIF) RADIUS / ULNA (WRIST);  Surgeon: Man Durant MD;  Location: [facility];  Service: Orthopedics    TONSILLECTOMY         FAMILY HISTORY:  Family History   Problem Relation Age of Onset    Atrial fibrillation Mother     Coronary artery disease Mother     Atrial fibrillation Father     Hypertension Father     Breast cancer Maternal Grandmother 60       SOCIAL HISTORY:  Social History     Tobacco Use    Smoking status: Former     Current packs/day: 0.00     Average packs/day: 1.5 packs/day for 30.0 years (45.1 ttl pk-yrs)     Types: Cigarettes     Start date: 6/15/1987     Quit date: 2008     Years since quittin.4     Passive exposure: Past    Smokeless tobacco: Never   Vaping Use    Vaping status: Never Used   Substance Use Topics    Alcohol use: Yes     Comment: social/anxiety    Drug use: Not Currently     Types: Marijuana     Comment: Previously       MEDICATIONS:    Current Outpatient Medications:     amLODIPine (NORVASC) 2.5 mg tablet, TAKE 1 TABLET DAILY, Disp: 90 tablet, Rfl: 0    Ascorbic Acid (VITAMIN C) 100 MG tablet, Take 100 mg by mouth daily, Disp: , Rfl:     atorvastatin (LIPITOR) 10 mg tablet, TAKE 1 TABLET DAILY, Disp: 90 tablet, Rfl: 1    estradiol (ESTRACE) 0.1 mg/g vaginal cream, Insert into the vagina Takes weekly on Monday evenings, Disp: , Rfl:     fluticasone (FLONASE) 50 mcg/act nasal spray, 1 spray into each nostril daily,  Disp: 48 g, Rfl: 1    polyethylene glycol (MIRALAX) 17 g packet, Take 17 g by mouth daily as needed (constipation), Disp: , Rfl: 0    acetaminophen (TYLENOL) 325 mg tablet, Take 3 tablets (975 mg total) by mouth every 8 (eight) hours as needed for mild pain (Patient not taking: Reported on 2/28/2024), Disp: , Rfl: 0    ALLERGIES:  Allergies   Allergen Reactions    Sulfa Antibiotics Edema    [place]ascus Purpureus Went Yeast Other (See Comments)     Doesn't remember    Other Rash    Penicillins Rash       REVIEW OF SYSTEMS:  Review of Systems   Constitutional:  Negative for chills, fever and unexpected weight change.   HENT:  Negative for hearing loss, nosebleeds and sore throat.    Eyes:  Negative for pain, redness and visual disturbance.   Respiratory:  Negative for cough, shortness of breath and wheezing.    Cardiovascular:  Negative for chest pain, palpitations and leg swelling.   Gastrointestinal:  Negative for abdominal pain and nausea.   Genitourinary:  Negative for dyspareunia, dysuria and frequency.   Skin:  Negative for rash and wound.   Neurological:  Negative for dizziness, numbness and headaches.   Psychiatric/Behavioral:  Negative for decreased concentration and suicidal ideas. The patient is not nervous/anxious.        VITALS:  Vitals:         _____________________________________________________  PHYSICAL EXAMINATION:  General: well developed and well nourished, alert, oriented times 3, and appears comfortable  Psychiatric: Normal  HEENT: Normocephalic, Atraumatic Trachea Midline, No torticollis  Pulmonary: No audible wheezing or respiratory distress   Cardiovascular: No pitting edema, 2+ radial pulse   Abdominal/GI: abdomen non tender, non distended   Skin: No masses, erythema, lacerations, fluctation, ulcerations  Neurovascular: Sensation Intact to the Median, Ulnar, Radial Nerve, Motor Intact to the Median, Ulnar, Radial Nerve, and Pulses Intact  Musculoskeletal: Normal, except as noted in detailed  exam and in HPI.      MUSCULOSKELETAL EXAMINATION:   Left hand:   Well healed surgical incision on the volar aspect of the wrist and carpal tunnel  Extension 40 degrees  Flexion 20 degrees  Full pronation and supination of the forearm  Full composite fist  Opposition intact   Sensation intact to light touch distally  Brisk capillary refill 2+    ___________________________________________________    STUDIES REVIEWED:  I have personally reviewed and interpreted  AP lateral and oblique radiographs of left wrist 3 views from 4/17/2024   which demonstrate which demonstrate hardware is in appropriate position with no signs of loosening or failure.      LABS REVIEWED:    HgA1c:   Lab Results   Component Value Date    HGBA1C 5.7 (H) 10/18/2023     BMP:   Lab Results   Component Value Date    GLUCOSE 83 04/28/2017    CALCIUM 9.7 02/29/2024     04/28/2017    K 3.9 02/29/2024    CO2 27 02/29/2024     02/29/2024    BUN 10 02/29/2024    CREATININE 0.61 02/29/2024             PROCEDURES PERFORMED:  Procedures  No Procedures performed today    _____________________________________________________      Scribe Attestation      I,:  Linda Rao am acting as a scribe while in the presence of the attending physician.:       I,:  Man Durant MD personally performed the services described in this documentation    as scribed in my presence.:

## 2024-06-06 ENCOUNTER — OFFICE VISIT (OUTPATIENT)
Dept: OCCUPATIONAL THERAPY | Facility: CLINIC | Age: 69
End: 2024-06-06
Payer: COMMERCIAL

## 2024-06-06 DIAGNOSIS — G56.02 LEFT CARPAL TUNNEL SYNDROME: ICD-10-CM

## 2024-06-06 DIAGNOSIS — S52.572D CLOSED INTRA-ARTICULAR DIE-PUNCH FRACTURE OF LEFT RADIUS WITH ROUTINE HEALING, SUBSEQUENT ENCOUNTER: ICD-10-CM

## 2024-06-06 DIAGNOSIS — S52.532D CLOSED COLLES' FRACTURE OF LEFT RADIUS WITH ROUTINE HEALING, SUBSEQUENT ENCOUNTER: Primary | ICD-10-CM

## 2024-06-06 DIAGNOSIS — Z48.89 AFTERCARE FOLLOWING SURGERY: ICD-10-CM

## 2024-06-06 PROCEDURE — 97110 THERAPEUTIC EXERCISES: CPT

## 2024-06-06 NOTE — PROGRESS NOTES
Daily Note     Today's date: 2024  Patient name: Maira Colby  : 1955  MRN: [id number]  Referring provider: Syeda Snell PA-C  Dx:   Encounter Diagnosis     ICD-10-CM    1. Closed Colles' fracture of left radius with routine healing, subsequent encounter  S52.532D       2. Aftercare following surgery  Z48.89       3. Left carpal tunnel syndrome  G56.02       4. Closed intra-articular die-punch fracture of left radius with routine healing, subsequent encounter  S52.574W                      Subjective: Patient offers no functional changes this date.         Objective: See treatment diary below       Auth Tracker  Auth Status Total   Visits  Expiration date Co-Insurance   approved     approved                           Visit Tracker  Date 3/5 3/7 3/12 3/14 3/18 3/21 3/25 3/28    4/4 4/9  RE   RE                                                                        PMHx:   has a past medical history of Anxiety, Cellulitis, Chronic rhinitis, Colon polyp, GERD without esophagitis, Hyperlipidemia, Hypometabolism, and Vitamin D deficiency.    Precautions: DR TORO sx 3/1/24       Manuals HEP 2024                         Ther Ex     Education on HEP and dx  x5min   AROM tendon glides x x10   AROM table top extension x x10   AROM digit add/abduction x    AROM wrist flex/ext/RD/UD  2x10   Therapist assisted PROM  X3 min    Scar massage and cupping  X3 min    pronator  X15 1 lbs    Digit ext band   X15 red   PREs wrist flex/ext/RD  X15 4lbs         Prayer stretch   5 x 10 sec        Ther Act      Towel scrunch   x5   Flexbar all planes  X15 green   Power web and twist  X10 green        WB theraball  X10 x 5 sec   Gripper with sponges  Level 3                  Modalities     MHP  x5 min           Assessment:   Patient tolerated session well. Session focused on ROM and strengthening to improve deficits and functional  performance with daily activities. Patient tolerated all TE/TA with no complaints. Patient progressing well towards goals. Patient benefiting from skilled hand therapy OT to reduce deficits to improve independence with daily activities.        Plan:   Focus on ROM and strengthening to improve ability to complete daily activites with ease.  POC 5/28/24 - 7/9/24

## 2024-06-10 ENCOUNTER — OFFICE VISIT (OUTPATIENT)
Dept: OCCUPATIONAL THERAPY | Facility: CLINIC | Age: 69
End: 2024-06-10
Payer: COMMERCIAL

## 2024-06-10 DIAGNOSIS — S52.532D CLOSED COLLES' FRACTURE OF LEFT RADIUS WITH ROUTINE HEALING, SUBSEQUENT ENCOUNTER: Primary | ICD-10-CM

## 2024-06-10 PROCEDURE — 97530 THERAPEUTIC ACTIVITIES: CPT

## 2024-06-10 PROCEDURE — 97110 THERAPEUTIC EXERCISES: CPT

## 2024-06-10 NOTE — PROGRESS NOTES
Daily Note     Today's date: 6/10/2024  Patient name: Maira Colby  : 1955  MRN: [id number]  Referring provider: Syeda Snell PA-C  Dx:   Encounter Diagnosis     ICD-10-CM    1. Closed Colles' fracture of left radius with routine healing, subsequent encounter  S52.532D                      Subjective: Patient offers no functional changes this date.         Objective: See treatment diary below       Auth Tracker  Auth Status Total   Visits  Expiration date Co-Insurance   approved     approved                           Visit Tracker  Date 3/5 3/7 3/12 3/14 3/18 3/21 3/25 3/28    4/4 4/9  RE   RE 5/30    6/6 6/10                                                                      PMHx:   has a past medical history of Anxiety, Cellulitis, Chronic rhinitis, Colon polyp, GERD without esophagitis, Hyperlipidemia, Hypometabolism, and Vitamin D deficiency.    Precautions: DR TORO sx 3/1/24       Manuals HEP 6/10/2024                         Ther Ex     Education on HEP and dx  x5min   AROM tendon glides x x10   AROM table top extension x x10   AROM digit add/abduction x    AROM wrist flex/ext/RD/UD  2x10   Therapist assisted PROM  X3 min    Scar massage and cupping  X3 min    pronator  X15 1 lbs    Digit ext band   X15 green   PREs wrist flex/ext/RD  X15 4lbs         Prayer stretch   5 x 10 sec        Ther Act      Towel scrunch   x5   Flexbar all planes  X15 green   Power web and twist  X10 green        WB theraball  X10 x 5 sec   Gripper with sponges  Level 3                  Modalities     MHP  x5 min           Assessment:   Patient tolerated session well. Session focused on ROM and strengthening to improve deficits and functional performance with daily activities. Patient tolerated all TE/TA with no complaints. Patient progressing well towards goals. Patient benefiting from skilled hand therapy OT to reduce deficits to improve  independence with daily activities.        Plan:   Focus on ROM and strengthening to improve ability to complete daily activites with ease.  POC 5/28/24 - 7/9/24

## 2024-06-13 ENCOUNTER — OFFICE VISIT (OUTPATIENT)
Dept: OCCUPATIONAL THERAPY | Facility: CLINIC | Age: 69
End: 2024-06-13
Payer: COMMERCIAL

## 2024-06-13 DIAGNOSIS — S52.532D CLOSED COLLES' FRACTURE OF LEFT RADIUS WITH ROUTINE HEALING, SUBSEQUENT ENCOUNTER: Primary | ICD-10-CM

## 2024-06-13 PROCEDURE — 97530 THERAPEUTIC ACTIVITIES: CPT

## 2024-06-13 PROCEDURE — 97110 THERAPEUTIC EXERCISES: CPT

## 2024-06-13 NOTE — PROGRESS NOTES
Daily Note     Today's date: 2024  Patient name: Maira Colby  : 1955  MRN: [id number]  Referring provider: Syeda Snell PA-C  Dx:   Encounter Diagnosis     ICD-10-CM    1. Closed Colles' fracture of left radius with routine healing, subsequent encounter  S52.532D                      Subjective: Patient offers no functional changes this date.         Objective: See treatment diary below       Auth Tracker  Auth Status Total   Visits  Expiration date Co-Insurance   approved     approved     approved                     Visit Tracker  Date 3/5 3/7 3/12 3/14 3/18 3/21 3/25 3/28    4/4 4/9  RE   RE 5/30    6/6 6/10 6/13            x                                                         PMHx:   has a past medical history of Anxiety, Cellulitis, Chronic rhinitis, Colon polyp, GERD without esophagitis, Hyperlipidemia, Hypometabolism, and Vitamin D deficiency.    Precautions: DR TORO sx 3/1/24       Manuals HEP 2024                         Ther Ex     Education on HEP and dx  x5min   AROM tendon glides x x10   AROM table top extension x x10   AROM digit add/abduction x    AROM wrist flex/ext/RD/UD  2x10   Therapist assisted PROM  X3 min    Scar massage and cupping  X3 min    pronator  X15 1 lbs    Digit ext band   X15 green   PREs wrist flex/ext/RD  X15 4lbs         Prayer stretch   5 x 10 sec        Ther Act      Towel scrunch   x5   Flexbar all planes  X15 green   Power web and twist  X10 green        WB theraball  X10 x 5 sec   Gripper with sponges  Level 3                  Modalities     MHP  x5 min           Assessment:   Patient tolerated session well. Session focused on ROM and strengthening to improve deficits and functional performance with daily activities. Patient tolerated all TE/TA with no complaints. Patient progressing well towards goals. Patient benefiting from skilled hand therapy OT to reduce deficits  to improve independence with daily activities.        Plan:   Focus on ROM and strengthening to improve ability to complete daily activites with ease.  POC 5/28/24 - 7/9/24

## 2024-06-17 ENCOUNTER — OFFICE VISIT (OUTPATIENT)
Dept: OCCUPATIONAL THERAPY | Facility: CLINIC | Age: 69
End: 2024-06-17
Payer: COMMERCIAL

## 2024-06-17 DIAGNOSIS — S52.532D CLOSED COLLES' FRACTURE OF LEFT RADIUS WITH ROUTINE HEALING, SUBSEQUENT ENCOUNTER: Primary | ICD-10-CM

## 2024-06-17 PROCEDURE — 97110 THERAPEUTIC EXERCISES: CPT

## 2024-06-17 PROCEDURE — 97530 THERAPEUTIC ACTIVITIES: CPT

## 2024-06-20 ENCOUNTER — OFFICE VISIT (OUTPATIENT)
Dept: OCCUPATIONAL THERAPY | Facility: CLINIC | Age: 69
End: 2024-06-20
Payer: COMMERCIAL

## 2024-06-20 DIAGNOSIS — S52.532D CLOSED COLLES' FRACTURE OF LEFT RADIUS WITH ROUTINE HEALING, SUBSEQUENT ENCOUNTER: Primary | ICD-10-CM

## 2024-06-20 PROCEDURE — 97110 THERAPEUTIC EXERCISES: CPT

## 2024-06-20 PROCEDURE — 97530 THERAPEUTIC ACTIVITIES: CPT

## 2024-06-20 NOTE — PROGRESS NOTES
Daily Note     Today's date: 2024  Patient name: Maira Colby  : 1955  MRN: [id number]  Referring provider: Syeda Snell PA-C  Dx:   Encounter Diagnosis     ICD-10-CM    1. Closed Colles' fracture of left radius with routine healing, subsequent encounter  S52.532D                      Subjective: Patient offers no functional changes this date.         Objective: See treatment diary below       Auth Tracker  Auth Status Total   Visits  Expiration date Co-Insurance   approved     approved     approved                     Visit Tracker  Date 3/5 3/7 3/12 3/14 3/18 3/21 3/25 3/28    4/4 4/9  RE   RE 5/30    6/6 6/10 6/13 6/17 6/20          x                                                         PMHx:   has a past medical history of Anxiety, Cellulitis, Chronic rhinitis, Colon polyp, GERD without esophagitis, Hyperlipidemia, Hypometabolism, and Vitamin D deficiency.    Precautions: DR TORO sx 3/1/24       Manuals HEP 2024                         Ther Ex     Education on HEP and dx  x5min   AROM tendon glides x x10   AROM table top extension x x10   AROM digit add/abduction x    AROM wrist flex/ext/RD/UD  2x10   Therapist assisted PROM  X3 min    Scar massage and cupping  X3 min    pronator  X15 1 lbs    Digit ext band   X15 green   PREs wrist flex/ext/RD  X15 4lbs         Prayer stretch   5 x 10 sec        Ther Act      Towel scrunch   x5   Flexbar all planes  X15 green   Power web and twist  X10 red        WB theraball  X10 x 5 sec   Gripper with sponges  Level 3   Plynth walks on table  x5             Modalities     MHP  x5 min           Assessment:   Patient tolerated session well. Session focused on ROM and strengthening to improve deficits and functional performance with daily activities. Patient tolerated all TE/TA with no complaints. Patient progressing well towards goals. Patient benefiting from skilled hand  therapy OT to reduce deficits to improve independence with daily activities.        Plan:   Focus on ROM and strengthening to improve ability to complete daily activites with ease.  POC 5/28/24 - 7/9/24

## 2024-06-25 ENCOUNTER — OFFICE VISIT (OUTPATIENT)
Dept: OCCUPATIONAL THERAPY | Facility: CLINIC | Age: 69
End: 2024-06-25
Payer: COMMERCIAL

## 2024-06-25 DIAGNOSIS — Z48.89 AFTERCARE FOLLOWING SURGERY: ICD-10-CM

## 2024-06-25 DIAGNOSIS — G56.02 LEFT CARPAL TUNNEL SYNDROME: ICD-10-CM

## 2024-06-25 DIAGNOSIS — S52.532D CLOSED COLLES' FRACTURE OF LEFT RADIUS WITH ROUTINE HEALING, SUBSEQUENT ENCOUNTER: Primary | ICD-10-CM

## 2024-06-25 PROCEDURE — 97530 THERAPEUTIC ACTIVITIES: CPT

## 2024-06-25 PROCEDURE — 97110 THERAPEUTIC EXERCISES: CPT

## 2024-06-25 NOTE — PROGRESS NOTES
Daily Note     Today's date: 2024  Patient name: Maira Colby  : 1955  MRN: [id number]  Referring provider: Syeda Snell PA-C  Dx:   Encounter Diagnosis     ICD-10-CM    1. Closed Colles' fracture of left radius with routine healing, subsequent encounter  S52.532D       2. Aftercare following surgery  Z48.89       3. Left carpal tunnel syndrome  G56.02             Start Time: 1715  Stop Time: 1800  Total time in clinic (min): 45 minutes  Subjective: Patient offers no functional changes this date.         Objective: See treatment diary below       Auth Tracker  Auth Status Total   Visits  Expiration date Co-Insurance   approved     approved     approved                     Visit Tracker  Date 3/5 3/7 3/12 3/14 3/18 3/21 3/25 3/28    4/4 4/9  RE   RE 5/30    6/6 6/10 6/13 6/17 6/20 6/25         x                                                         PMHx:   has a past medical history of Anxiety, Cellulitis, Chronic rhinitis, Colon polyp, GERD without esophagitis, Hyperlipidemia, Hypometabolism, and Vitamin D deficiency.    Precautions: DR TORO sx 3/1/24       Manuals HEP 2024                         Ther Ex     Education on HEP and dx  x5min   AROM tendon glides x x10   AROM table top extension x x10   AROM digit add/abduction x    AROM wrist flex/ext/RD/UD  2x10   Therapist assisted PROM  X3 min    Scar massage and cupping  X3 min    pronator  X15 2 lbs    Digit ext band   X15 green   PREs wrist flex/ext/RD  X15 4lbs         Prayer stretch   5 x 10 sec        Ther Act      Towel scrunch      Flexbar all planes  X15 green   Power web and twist  X10 red        WB theraball     Gripper with sponges  Level 3   Plynth walks on table  x5             Modalities     MHP  x5 min           Assessment:   Patient tolerated session well. Session focused on ROM and strengthening to improve deficits and functional performance  with daily activities. Patient tolerated all TE/TA with no complaints. Patient progressing well towards goals. Patient benefiting from skilled hand therapy OT to reduce deficits to improve independence with daily activities.        Plan:   Focus on ROM and strengthening to improve ability to complete daily activites with ease.  POC 5/28/24 - 7/9/24

## 2024-06-27 ENCOUNTER — OFFICE VISIT (OUTPATIENT)
Dept: OCCUPATIONAL THERAPY | Facility: CLINIC | Age: 69
End: 2024-06-27
Payer: COMMERCIAL

## 2024-06-27 DIAGNOSIS — G56.02 LEFT CARPAL TUNNEL SYNDROME: ICD-10-CM

## 2024-06-27 DIAGNOSIS — S52.572D CLOSED INTRA-ARTICULAR DIE-PUNCH FRACTURE OF LEFT RADIUS WITH ROUTINE HEALING, SUBSEQUENT ENCOUNTER: ICD-10-CM

## 2024-06-27 DIAGNOSIS — S52.532D CLOSED COLLES' FRACTURE OF LEFT RADIUS WITH ROUTINE HEALING, SUBSEQUENT ENCOUNTER: Primary | ICD-10-CM

## 2024-06-27 DIAGNOSIS — Z48.89 AFTERCARE FOLLOWING SURGERY: ICD-10-CM

## 2024-06-27 PROCEDURE — 97110 THERAPEUTIC EXERCISES: CPT

## 2024-06-27 NOTE — PROGRESS NOTES
Daily Note     Today's date: 2024  Patient name: Maira Colby  : 1955  MRN: [id number]  Referring provider: Syeda Snell PA-C  Dx:   Encounter Diagnosis     ICD-10-CM    1. Closed Colles' fracture of left radius with routine healing, subsequent encounter  S52.532D       2. Aftercare following surgery  Z48.89       3. Left carpal tunnel syndrome  G56.02       4. Closed intra-articular die-punch fracture of left radius with routine healing, subsequent encounter  S52.572D             Start Time: 1715  Stop Time: 1800  Total time in clinic (min): 45 minutes  Subjective: Patient offers no functional changes this date.         Objective: See treatment diary below       Auth Tracker  Auth Status Total   Visits  Expiration date Co-Insurance   approved     approved     approved                     Visit Tracker  Date 3/5 3/7 3/12 3/14 3/18 3/21 3/25 3/28    4/4 4/9  RE   RE 5/30    6/6 6/10 6/13 6/17 6/20 6/25 6/27        x                                                         PMHx:   has a past medical history of Anxiety, Cellulitis, Chronic rhinitis, Colon polyp, GERD without esophagitis, Hyperlipidemia, Hypometabolism, and Vitamin D deficiency.    Precautions: DR TORO sx 3/1/24       Manuals HEP 2024                         Ther Ex     Education on HEP and dx  x5min   AROM tendon glides x x10   AROM table top extension x x10   AROM digit add/abduction x    AROM wrist flex/ext/RD/UD  2x10   Therapist assisted PROM  X3 min    Scar massage and cupping  X3 min    pronator  X15 2 lbs    Digit ext band   X15 green   PREs wrist flex/ext/RD  X15 4lbs         Prayer stretch   5 x 10 sec        Ther Act      Towel scrunch      Flexbar all planes  X15 green   Power web and twist  X10 red        WB theraball     Gripper with sponges  Level 3   Plynth walks on table  x5             Modalities     MHP  x5 min           Assessment:    Patient tolerated session well. Session focused on ROM and strengthening to improve deficits and functional performance with daily activities. Patient tolerated all TE/TA with no complaints. Patient progressing well towards goals. Patient benefiting from skilled hand therapy OT to reduce deficits to improve independence with daily activities.        Plan:   Focus on ROM and strengthening to improve ability to complete daily activites with ease.  POC 5/28/24 - 7/9/24

## 2024-07-02 ENCOUNTER — OFFICE VISIT (OUTPATIENT)
Dept: OCCUPATIONAL THERAPY | Facility: CLINIC | Age: 69
End: 2024-07-02
Payer: COMMERCIAL

## 2024-07-02 DIAGNOSIS — G56.02 LEFT CARPAL TUNNEL SYNDROME: ICD-10-CM

## 2024-07-02 DIAGNOSIS — S52.572D CLOSED INTRA-ARTICULAR DIE-PUNCH FRACTURE OF LEFT RADIUS WITH ROUTINE HEALING, SUBSEQUENT ENCOUNTER: ICD-10-CM

## 2024-07-02 DIAGNOSIS — S52.532D CLOSED COLLES' FRACTURE OF LEFT RADIUS WITH ROUTINE HEALING, SUBSEQUENT ENCOUNTER: Primary | ICD-10-CM

## 2024-07-02 DIAGNOSIS — Z48.89 AFTERCARE FOLLOWING SURGERY: ICD-10-CM

## 2024-07-02 PROCEDURE — 97110 THERAPEUTIC EXERCISES: CPT

## 2024-07-02 PROCEDURE — 97530 THERAPEUTIC ACTIVITIES: CPT

## 2024-07-02 NOTE — PROGRESS NOTES
Daily Note     Today's date: 2024  Patient name: Maira Colby  : 1955  MRN: [id number]  Referring provider: Syeda Snell PA-C  Dx:   Encounter Diagnosis     ICD-10-CM    1. Closed Colles' fracture of left radius with routine healing, subsequent encounter  S52.532D       2. Aftercare following surgery  Z48.89       3. Closed intra-articular die-punch fracture of left radius with routine healing, subsequent encounter  S52.572D       4. Left carpal tunnel syndrome  G56.02                    Subjective: Patient offers no functional changes this date.         Objective: See treatment diary below       Auth Tracker  Auth Status Total   Visits  Expiration date Co-Insurance   approved     approved     approved                     Visit Tracker  Date 3/5 3/7 3/12 3/14 3/18 3/21 3/25 3/28    4/4 4/9  RE   RE 5/30    6/6 6/10 6/13 6/17 6/20 6/25 6/27 7/2    RE   x                                                         PMHx:   has a past medical history of Anxiety, Cellulitis, Chronic rhinitis, Colon polyp, GERD without esophagitis, Hyperlipidemia, Hypometabolism, and Vitamin D deficiency.    Precautions: DR TORO sx 3/1/24       Manuals HEP 2024                         Ther Ex     Education on HEP and dx  x5min   AROM tendon glides x x10   AROM table top extension x x10   AROM digit add/abduction x    AROM wrist flex/ext/RD/UD  2x10   Therapist assisted PROM  X3 min    Scar massage and cupping  X3 min    pronator  X15 2 lbs    Digit ext band   X15 green   PREs wrist flex/ext/RD  X15 4lbs         Prayer stretch   5 x 10 sec        Ther Act      Towel scrunch      Flexbar all planes  X15 green   Power web and twist  X10 red        WB theraball     Gripper with sponges  Level 3   Plynth walks on table  x5             Modalities     MHP  x5 min             Assessment:   Patient tolerated session well. Session focused on ROM and  strengthening to improve deficits and functional performance with daily activities. Patient tolerated all TE/TA with no complaints. Patient progressing well towards goals. Patient benefiting from skilled hand therapy OT to reduce deficits to improve independence with daily activities.            Plan:   Focus on ROM and strengthening to improve ability to complete daily activites with ease.  POC 5/28/24 - 7/9/24

## 2024-07-08 ENCOUNTER — OFFICE VISIT (OUTPATIENT)
Dept: OCCUPATIONAL THERAPY | Facility: CLINIC | Age: 69
End: 2024-07-08
Payer: COMMERCIAL

## 2024-07-08 DIAGNOSIS — S52.532D CLOSED COLLES' FRACTURE OF LEFT RADIUS WITH ROUTINE HEALING, SUBSEQUENT ENCOUNTER: Primary | ICD-10-CM

## 2024-07-08 PROCEDURE — 97110 THERAPEUTIC EXERCISES: CPT

## 2024-07-08 PROCEDURE — 97530 THERAPEUTIC ACTIVITIES: CPT

## 2024-07-08 NOTE — PROGRESS NOTES
Re-Evaluation Note     Today's date: 2024  Patient name: Maira Colby  : 1955  MRN: [id number]  Referring provider: Syeda Snell PA-C  Dx:   Encounter Diagnosis     ICD-10-CM    1. Closed Colles' fracture of left radius with routine healing, subsequent encounter  S52.532D                    Assessment  Assessment details:   Patient re-evaluation done this this date with noted improvement in ROM and strength. Patient strength also assessed this date with noted deficits in LUE compared to RUE at this time. Patient would benefit from continued skilled OT to maximize ROM and strength at this time to improve functional use with ADLs.       Patient is a 68 y.o. female who presents for OT IE and treatment for Left DR ORIF and CTR done on 3/1/24 by Dr. Durant. Patient reports falling on 24 while walking by her home. Patient referred by Dr. Durant to initiate treatment including hand therapy.    Impairments: abnormal or restricted ROM, impaired physical strength, lacks appropriate home exercise program and pain with function  Understanding of Dx/Px/POC: good   Prognosis: good     Goals  Short term goals 2-4 weeks  Establish HEP to enhance performance with ADLs. met     Improve active range of motion of LUE by 20  to assist with UE dressing. MET     Achieve composite digital flexion to touch distal jackson crease for grasp on utensils. MET     In crease  strength by 10 lbs. to enhance gripping hand tools. Met    Improve wrist extension to 65 degrees. New    Improve  strength to 45 lbs on LUE. New           Long Term goals by discharge  Establish final home exercise program to enhance maximal functional level with ADLs. progressing     Achieve functional active range of motion of LUE for full return to household chores. progressing                            Achieve functional strength of LUE for full return to high level ADLs. progressing              Plan  Patient would benefit from: OT virgen,  skilled occupational therapy, orthotics and custom splinting  Planned modality interventions: thermotherapy: hydrocollator packs, cryotherapy, TENS, unattended electrical stimulation, ultrasound and electrical stimulation/Russian stimulation  Planned therapy interventions: manual therapy, joint mobilization, strengthening, stretching, therapeutic activities, therapeutic exercise, home exercise program, graded exercise, graded activity, functional ROM exercises, flexibility, orthotic fitting/training, IASTM, kinesiology taping and massage  Frequency: 2x week  Duration in weeks: 8  Plan of Care beginning date: 24  Plan of Care expiration date: 24  Treatment plan discussed with: patient        Subjective Evaluation     History of Present Illness  Date of onset: 2024  Date of surgery: 3/1/2024  Mechanism of injury: surgery  Mechanism of injury: Patient is a 68 y.o. RHD female who presents for OT IE and treatment for Left DR ORIF and CTR done on 3/1/24 by Dr. Durant. Patient reports falling on 24 while walking by her home. Patient referred by Dr. Durant to initiate treatment including hand therapy and splint post operative.    Pain  Current pain ratin  At best pain ratin  At worst pain rating: 3           Objective      Observations      Left Wrist/Hand   Positive for incision.      Additional Observation Details  Patient has volar longitudinal incision present on LUE sutures in place and steri strips over top. Incision is CDI at this time. Patient instructed on signs and symptoms of infection - redness, pain, drainage, fever, malaise.  Instructed to call physician if any signs of infection present.  If physician is not available, advised to go to [facility] for immediate consult.          Active Range of Motion      Left Elbow   Forearm supination: 70 degrees   Forearm pronation: 85 degrees      Right Elbow   Forearm supination: 70 degrees   Forearm pronation: 85 degrees      Left  Wrist   Wrist flexion: 62 degrees   Wrist extension: 57 degrees   Radial deviation: 17 degrees   Ulnar deviation: 30 degrees      Right Wrist   Wrist flexion: 65 degrees   Wrist extension: 70 degrees   Radial deviation: 17 degrees   Ulnar deviation: 25 degrees      Left Thumb   Kapandji score: 10      Opposition: Patient able to oppose to long finger at this time.      Right Thumb   Opposition: WNL  Kapandji score: 10 degrees     strength Right - 52      strength Left - 42     Additional Active Range of Motion Details  Patient demonstrating difficulty with ulnar nerve distribution pain at this time.                           Daily Note     Today's date: 2024  Patient name: Maira Colby  : 1955  MRN: [id number]  Referring provider: Syeda Snell PA-C  Dx:   Encounter Diagnosis     ICD-10-CM    1. Closed Colles' fracture of left radius with routine healing, subsequent encounter  S52.532D                    Subjective: Patient offers no functional changes this date.         Objective: See treatment diary below       Auth Tracker  Auth Status Total   Visits  Expiration date Co-Insurance   approved     approved     approved                     Visit Tracker  Date 3/5 3/7 3/12 3/14 3/18 3/21 3/25 3/28    4/4 4/9  RE   RE 5/30    6/6 6/10 6/13 6/17 6/20 6/25 6/27 7/2    7/8   RE   x                                                         PMHx:   has a past medical history of Anxiety, Cellulitis, Chronic rhinitis, Colon polyp, GERD without esophagitis, Hyperlipidemia, Hypometabolism, and Vitamin D deficiency.    Precautions: DR TORO sx 3/1/24       Manuals HEP 2024                         Ther Ex     Education on HEP and dx  x5min   AROM tendon glides x x10   AROM table top extension x x10   AROM digit add/abduction x    AROM wrist flex/ext/RD/UD  2x10   Therapist assisted PROM  X3 min    Scar massage and cupping  X3  min    pronator  X15 2 lbs    Digit ext band   X15 green   PREs wrist flex/ext/RD  X15 4lbs         Prayer stretch   5 x 10 sec        Ther Act      Towel scrunch      Flexbar all planes  X15 green   Power web and twist  X10 red        WB theraball     Gripper with sponges  Level 3   Plynth walks on table  x5             Modalities     MHP  x5 min             Assessment:   Patient tolerated session well. Session focused on ROM and strengthening to improve deficits and functional performance with daily activities. Patient tolerated all TE/TA with no complaints. Patient progressing well towards goals. Patient benefiting from skilled hand therapy OT to reduce deficits to improve independence with daily activities.            Plan:   Focus on ROM and strengthening to improve ability to complete daily activites with ease.  POC 7/8/24 - 9/2/24

## 2024-07-11 ENCOUNTER — OFFICE VISIT (OUTPATIENT)
Dept: OCCUPATIONAL THERAPY | Facility: CLINIC | Age: 69
End: 2024-07-11
Payer: COMMERCIAL

## 2024-07-11 DIAGNOSIS — S52.532D CLOSED COLLES' FRACTURE OF LEFT RADIUS WITH ROUTINE HEALING, SUBSEQUENT ENCOUNTER: Primary | ICD-10-CM

## 2024-07-11 DIAGNOSIS — Z48.89 AFTERCARE FOLLOWING SURGERY: ICD-10-CM

## 2024-07-11 DIAGNOSIS — G56.02 LEFT CARPAL TUNNEL SYNDROME: ICD-10-CM

## 2024-07-11 DIAGNOSIS — S52.572D CLOSED INTRA-ARTICULAR DIE-PUNCH FRACTURE OF LEFT RADIUS WITH ROUTINE HEALING, SUBSEQUENT ENCOUNTER: ICD-10-CM

## 2024-07-11 PROCEDURE — 97530 THERAPEUTIC ACTIVITIES: CPT

## 2024-07-11 PROCEDURE — 97110 THERAPEUTIC EXERCISES: CPT

## 2024-07-11 NOTE — PROGRESS NOTES
Daily Note     Today's date: 2024  Patient name: Maira Colby  : 1955  MRN: [id number]  Referring provider: Syeda Snell PA-C  Dx:   Encounter Diagnosis     ICD-10-CM    1. Closed Colles' fracture of left radius with routine healing, subsequent encounter  S52.532D       2. Aftercare following surgery  Z48.89       3. Closed intra-articular die-punch fracture of left radius with routine healing, subsequent encounter  S52.572D       4. Left carpal tunnel syndrome  G56.02                    Subjective: Patient offers no functional changes this date.         Objective: See treatment diary below       Auth Tracker  Auth Status Total   Visits  Expiration date Co-Insurance   approved     approved     approved                     Visit Tracker  Date 3/5 3/7 3/12 3/14 3/18 3/21 3/25 3/28    4/4 4/9  RE   RE 5/30    6/6 6/10 6/13 6/17 6/20 6/25 6/27 7   RE   x                                                         PMHx:   has a past medical history of Anxiety, Cellulitis, Chronic rhinitis, Colon polyp, GERD without esophagitis, Hyperlipidemia, Hypometabolism, and Vitamin D deficiency.    Precautions: DR TORO sx 3/1/24       Manuals HEP 2024                         Ther Ex     Education on HEP and dx  x5min   AROM tendon glides x x10   AROM table top extension x x10   AROM digit add/abduction x    AROM wrist flex/ext/RD/UD  2x10   Therapist assisted PROM  X3 min    Scar massage and cupping  X3 min    pronator  X15 2 lbs    Digit ext band   X15 green   PREs wrist flex/ext/RD  X15 4lbs         Prayer stretch   5 x 10 sec        Ther Act      Towel scrunch      Flexbar all planes  X15 green   Power web and twist  X10 red        WB theraball     Gripper with sponges  Level 3   Plynth walks on table  x5             Modalities     MHP  x5 min             Assessment:   Patient tolerated session well. Session  focused on ROM and strengthening to improve deficits and functional performance with daily activities. Patient tolerated all TE/TA with no complaints. Patient progressing well towards goals. Patient benefiting from skilled hand therapy OT to reduce deficits to improve independence with daily activities.            Plan:   Focus on ROM and strengthening to improve ability to complete daily activites with ease.  POC 7/8/24 - 9/2/24

## 2024-07-25 ENCOUNTER — APPOINTMENT (OUTPATIENT)
Dept: OCCUPATIONAL THERAPY | Facility: CLINIC | Age: 69
End: 2024-07-25
Payer: COMMERCIAL

## 2024-08-09 ENCOUNTER — APPOINTMENT (OUTPATIENT)
Dept: RADIOLOGY | Facility: CLINIC | Age: 69
End: 2024-08-09
Payer: COMMERCIAL

## 2024-08-09 ENCOUNTER — OFFICE VISIT (OUTPATIENT)
Dept: FAMILY MEDICINE CLINIC | Facility: CLINIC | Age: 69
End: 2024-08-09
Payer: COMMERCIAL

## 2024-08-09 VITALS
TEMPERATURE: 96 F | BODY MASS INDEX: 30.73 KG/M2 | WEIGHT: 180 LBS | SYSTOLIC BLOOD PRESSURE: 124 MMHG | RESPIRATION RATE: 20 BRPM | HEIGHT: 64 IN | DIASTOLIC BLOOD PRESSURE: 70 MMHG | HEART RATE: 80 BPM

## 2024-08-09 DIAGNOSIS — M79.605 LEFT LEG PAIN: ICD-10-CM
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
## 2024-08-09 PROCEDURE — 99213 OFFICE O/P EST LOW 20 MIN: CPT | Performed by: NURSE PRACTITIONER

## 2024-08-09 PROCEDURE — 73590 X-RAY EXAM OF LOWER LEG: CPT

## 2024-08-09 NOTE — PROGRESS NOTES
"Ambulatory Visit  Name: Maira Colby      : 1955      MRN: [id number]  Encounter Provider: LEOPOLDO Clemons  Encounter Date: 2024   Encounter department: [facility]    Recommended knee compression splint, ice, elevating leg, and continue ibuprofen as needed.  Will follow-up with results of x-ray  Assessment & Plan   1. Fall, initial encounter  -     XR tibia fibula 2 vw left; Future; Expected date: 2024  2. Left leg pain  -     XR tibia fibula 2 vw left; Future; Expected date: 2024       History of Present Illness     Here today with complaints of left leg pain.  Reports that she tripped over her dog, falling back and hitting the portable air conditioner.  She landed on her bottom.  She had some low back discomfort after the fall, however has since developed left leg pain as well as some swelling near her knee.  It hurts when she bears weight on her left leg.  There is no bruising or hip pain.  She is taking ibuprofen OTC        Review of Systems   Constitutional: Negative.    Musculoskeletal:         See HPI   Neurological:  Negative for weakness and numbness.       Objective     /70   Pulse 80   Temp (!) 96 °F (35.6 °C)   Resp 20   Ht 5' 4\" (1.626 m)   Wt 81.6 kg (180 lb)   BMI 30.90 kg/m²     Physical Exam  Vitals and nursing note reviewed.   Constitutional:       General: She is not in acute distress.     Appearance: Normal appearance. She is well-developed. She is not diaphoretic.   Eyes:      Conjunctiva/sclera: Conjunctivae normal.   Pulmonary:      Effort: Pulmonary effort is normal. No respiratory distress.   Musculoskeletal:      Lumbar back: Normal.      Left hip: Normal.      Comments: Left knee mild prepatellar swelling, no bruising.  Knee nontender on palpation, however pain with flexion and weightbearing   Neurological:      Mental Status: She is alert.   Psychiatric:         Mood and Affect: Mood normal.         Behavior: Behavior normal. "       Administrative Statements

## 2024-08-19 DIAGNOSIS — D32.9 MENINGIOMA (HCC): ICD-10-CM

## 2024-08-19 DIAGNOSIS — R01.1 CARDIAC MURMUR: ICD-10-CM

## 2024-08-19 DIAGNOSIS — E78.2 MIXED HYPERLIPIDEMIA: ICD-10-CM

## 2024-08-19 DIAGNOSIS — I10 PRIMARY HYPERTENSION: ICD-10-CM

## 2024-08-19 DIAGNOSIS — Z82.49 FAMILY HISTORY OF HEART DISEASE: ICD-10-CM

## 2024-08-19 DIAGNOSIS — R94.39 ABNORMAL STRESS ECG WITH TREADMILL: ICD-10-CM

## 2024-08-19 RX ORDER — AMLODIPINE BESYLATE 2.5 MG/1
2.5 TABLET ORAL DAILY
Qty: 90 TABLET | Refills: 0 | Status: SHIPPED | OUTPATIENT
Start: 2024-08-19

## 2024-08-26 ENCOUNTER — TELEPHONE (OUTPATIENT)
Age: 69
End: 2024-08-26

## 2024-08-26 NOTE — TELEPHONE ENCOUNTER
Pt called to say she still has pain that is now in her left knee. Originally the pain was in her upper leg that moved down her leg. Her knee feels better when walking. When she is sitting down, her pain is a 3 out of 10. Pt was taking ibuprofen for the past few weeks, she is now taking Advil. Pt declined a nurse triage. Please advise.

## 2024-08-27 NOTE — TELEPHONE ENCOUNTER
She can continue with ibuprofen as needed.  I think it is reassuring that she is not having pain in her knee when she is walking, this will likely improve with additional time.  I can always put in a referral for her to have physical therapy if she does not feel like it is getting better. LEOPOLDO Clemons

## 2024-09-04 ENCOUNTER — OFFICE VISIT (OUTPATIENT)
Dept: OBGYN CLINIC | Facility: CLINIC | Age: 69
End: 2024-09-04
Payer: COMMERCIAL

## 2024-09-04 VITALS
SYSTOLIC BLOOD PRESSURE: 153 MMHG | BODY MASS INDEX: 30.73 KG/M2 | WEIGHT: 180 LBS | HEART RATE: 60 BPM | DIASTOLIC BLOOD PRESSURE: 85 MMHG | HEIGHT: 64 IN

## 2024-09-04 DIAGNOSIS — S52.532D CLOSED COLLES' FRACTURE OF LEFT RADIUS WITH ROUTINE HEALING, SUBSEQUENT ENCOUNTER: Primary | ICD-10-CM

## 2024-09-04 PROCEDURE — 1159F MED LIST DOCD IN RCRD: CPT | Performed by: SURGERY

## 2024-09-04 PROCEDURE — 1160F RVW MEDS BY RX/DR IN RCRD: CPT | Performed by: SURGERY

## 2024-09-04 PROCEDURE — 99213 OFFICE O/P EST LOW 20 MIN: CPT | Performed by: SURGERY

## 2024-09-04 NOTE — PATIENT INSTRUCTIONS
Reminders to come back been seen:   1) 'crunchie sensation with pain/aching over the volar wrist where the tendons glide over the plate can fray/rupture tendons

## 2024-09-04 NOTE — PROGRESS NOTES
ASSESSMENT/PLAN:      69-year-old female here for her ORIF left distal radius and carpal tunnel release, date of surgery 3/1/2024.  On exam patient has full functional range of motion of the wrist and feels that she is back to all of her activities as tolerated.  Discussed with the patient that if she ever has aching pain or a crunchy sensation over the volar aspect of the wrist with the tendons glide over the plate, this is of concern due to a potentially fraying or rupturing tendons.  Patient shows understanding and agrees with this plan of care.  At this time we will patient in March 1 year from her surgery.       The patient verbalized understanding of exam findings and treatment plan. We engaged in the shared decision-making process and treatment options were discussed at length with the patient. Surgical and conservative management discussed today along with risks and benefits.    Diagnoses and all orders for this visit:    Closed Colles' fracture of left radius with routine healing, subsequent encounter        Follow Up:  Return in about 6 months (around 3/4/2025) for left wrist.      To Do Next Visit:  Re-evaluation of current issue    ____________________________________________________________________________________________________________________________________________      CHIEF COMPLAINT:  Chief Complaint   Patient presents with    Follow-up     Patient is doing well she is not having any pain. OT completed        SUBJECTIVE:  Maira Colby is a 69 y.o. year old RHD female who presents today for a 3 month follow up for her ORIF left distal radius and carpal tunnel release, date of surgery 3/1/2024.        I have personally reviewed all the relevant PMH, PSH, SH, FH, Medications and allergies.     PAST MEDICAL HISTORY:  Past Medical History:   Diagnosis Date    Anxiety     Cellulitis     Chronic rhinitis     Last Assessed:6/6/2016    Colon polyp     GERD without esophagitis     Last Assessed:6/6/2016     Hyperlipidemia     Hypometabolism     Vitamin D deficiency        PAST SURGICAL HISTORY:  Past Surgical History:   Procedure Laterality Date    COLONOSCOPY      MO CRNEC TREPHINE BONE FLAP MENINGIOMA SUPRATENTOR Left 2023    Procedure: Left frontal CRANIOTOMY IMAGE-GUIDED FOR TUMOR;  Surgeon: Sigifredo Sagastume MD;  [facility];  Service: Neurosurgery    MO NEUROPLASTY &/TRANSPOS MEDIAN NRV CARPAL TUNNE Left 3/1/2024    Procedure: RELEASE CARPAL TUNNEL;  Surgeon: Man Durant MD;  Location: [facility];  Service: Orthopedics    MO OPTX DSTL RADL I-ARTIC FX/EPIPHYSL SEP 3 FRAG Left 3/1/2024    Procedure: OPEN REDUCTION W/ INTERNAL FIXATION (ORIF) RADIUS / ULNA (WRIST);  Surgeon: Man Durant MD;  Location: [facility];  Service: Orthopedics    TONSILLECTOMY         FAMILY HISTORY:  Family History   Problem Relation Age of Onset    Atrial fibrillation Mother     Coronary artery disease Mother     Atrial fibrillation Father     Hypertension Father     Breast cancer Maternal Grandmother 60       SOCIAL HISTORY:  Social History     Tobacco Use    Smoking status: Former     Current packs/day: 0.00     Average packs/day: 1.5 packs/day for 30.0 years (45.1 ttl pk-yrs)     Types: Cigarettes     Start date: 6/15/1987     Quit date: 2008     Years since quittin.6     Passive exposure: Past    Smokeless tobacco: Never   Vaping Use    Vaping status: Never Used   Substance Use Topics    Alcohol use: Yes     Comment: social/anxiety    Drug use: Not Currently     Types: Marijuana     Comment: Previously       MEDICATIONS:    Current Outpatient Medications:     acetaminophen (TYLENOL) 325 mg tablet, Take 3 tablets (975 mg total) by mouth every 8 (eight) hours as needed for mild pain, Disp: , Rfl: 0    amLODIPine (NORVASC) 2.5 mg tablet, TAKE 1 TABLET DAILY, Disp: 90 tablet, Rfl: 0    Ascorbic Acid (VITAMIN C) 100 MG tablet, Take 100 mg by mouth daily, Disp: , Rfl:     atorvastatin (LIPITOR)  10 mg tablet, TAKE 1 TABLET DAILY, Disp: 90 tablet, Rfl: 1    estradiol (ESTRACE) 0.1 mg/g vaginal cream, Insert into the vagina Takes weekly on Monday evenings, Disp: , Rfl:     fluticasone (FLONASE) 50 mcg/act nasal spray, 1 spray into each nostril daily, Disp: 48 g, Rfl: 1    polyethylene glycol (MIRALAX) 17 g packet, Take 17 g by mouth daily as needed (constipation), Disp: , Rfl: 0    ALLERGIES:  Allergies   Allergen Reactions    Sulfa Antibiotics Edema    Other Rash    Penicillins Rash    Red Yeast Rice Extract - Food Allergy Rash       REVIEW OF SYSTEMS:  Review of Systems   Constitutional:  Negative for chills, fever and unexpected weight change.   HENT:  Negative for hearing loss, nosebleeds and sore throat.    Eyes:  Negative for pain, redness and visual disturbance.   Respiratory:  Negative for cough, shortness of breath and wheezing.    Cardiovascular:  Negative for chest pain, palpitations and leg swelling.   Gastrointestinal:  Negative for abdominal pain and nausea.   Genitourinary:  Negative for dyspareunia, dysuria and frequency.   Skin:  Negative for rash and wound.   Neurological:  Negative for dizziness, numbness and headaches.   Psychiatric/Behavioral:  Negative for decreased concentration and suicidal ideas. The patient is not nervous/anxious.        VITALS:  Vitals:    09/04/24 0731   BP: 153/85   Pulse: 60         _____________________________________________________  PHYSICAL EXAMINATION:  General: well developed and well nourished, alert, oriented times 3, and appears comfortable  Psychiatric: Normal  HEENT: Normocephalic, Atraumatic Trachea Midline, No torticollis  Pulmonary: No audible wheezing or respiratory distress   Cardiovascular: No pitting edema, 2+ radial pulse   Abdominal/GI: abdomen non tender, non distended   Skin: No masses, erythema, lacerations, fluctation, ulcerations  Neurovascular: Sensation Intact to the Median, Ulnar, Radial Nerve, Motor Intact to the Median, Ulnar,  Radial Nerve, and Pulses Intact  Musculoskeletal: Normal, except as noted in detailed exam and in HPI.      MUSCULOSKELETAL EXAMINATION:    Left wrist:     Extension 70 degrees  Flexion 50 degrees  Lacking 5 degrees of supination  Full pronation   Full composite fist  Opposition intact  Sensation intact to light touch distally  Brisk capillary refill     ___________________________________________________    STUDIES REVIEWED:  I have personally reviewed and interpreted  AP lateral and oblique radiographs of xrays of the left wrist 4/17/2024 which demonstrate hardware is in appropriate position with no signs of loosening or failure.         LABS REVIEWED:    HgA1c:   Lab Results   Component Value Date    HGBA1C 5.7 (H) 10/18/2023     BMP:   Lab Results   Component Value Date    GLUCOSE 83 04/28/2017    CALCIUM 9.7 02/29/2024     04/28/2017    K 3.9 02/29/2024    CO2 27 02/29/2024     02/29/2024    BUN 10 02/29/2024    CREATININE 0.61 02/29/2024           PROCEDURES PERFORMED:  Procedures  No Procedures performed today    _____________________________________________________      Scribe Attestation      I,:  Linda Rao am acting as a scribe while in the presence of the attending physician.:       I,:  Man Durant MD personally performed the services described in this documentation    as scribed in my presence.:

## 2024-10-08 ENCOUNTER — OFFICE VISIT (OUTPATIENT)
Dept: FAMILY MEDICINE CLINIC | Facility: CLINIC | Age: 69
End: 2024-10-08
Payer: COMMERCIAL

## 2024-10-08 VITALS
WEIGHT: 178.4 LBS | HEART RATE: 52 BPM | DIASTOLIC BLOOD PRESSURE: 80 MMHG | HEIGHT: 64 IN | TEMPERATURE: 97.1 F | BODY MASS INDEX: 30.46 KG/M2 | RESPIRATION RATE: 18 BRPM | SYSTOLIC BLOOD PRESSURE: 148 MMHG

## 2024-10-08 DIAGNOSIS — S93.601A SPRAIN OF RIGHT FOOT, INITIAL ENCOUNTER: Primary | ICD-10-CM

## 2024-10-08 PROCEDURE — 99213 OFFICE O/P EST LOW 20 MIN: CPT | Performed by: FAMILY MEDICINE

## 2024-10-08 NOTE — PROGRESS NOTES
Assessment/Plan:    1. Sprain of right foot, initial encounter  -     oxaprozin (DAYPRO) 600 MG tablet; Take 1 tablet (600 mg total) by mouth 2 (two) times a day as needed (Pain)          There are no Patient Instructions on file for this visit.    No follow-ups on file.    Subjective:      Patient ID: Maira Colby is a 69 y.o. female.    Chief Complaint   Patient presents with    Foot Pain     Foot is swollen  Sugar Louie MA       Pt unknown to me sched for foot pain    Pt states she was here a month ago she hurt her leg after a fall - she was x rayed and all was good    PT states two weeks ago on flip flops - she slip states she felt a splitting pain in her rt foot.  Styates she hasd a pain some days are worse luis antonio others.          The following portions of the patient's history were reviewed and updated as appropriate: allergies, current medications, past family history, past medical history, past social history, past surgical history and problem list.    Review of Systems   Musculoskeletal:  Positive for arthralgias and joint swelling.         Current Outpatient Medications   Medication Sig Dispense Refill    acetaminophen (TYLENOL) 325 mg tablet Take 3 tablets (975 mg total) by mouth every 8 (eight) hours as needed for mild pain  0    amLODIPine (NORVASC) 2.5 mg tablet TAKE 1 TABLET DAILY 90 tablet 0    Ascorbic Acid (VITAMIN C) 100 MG tablet Take 100 mg by mouth daily      atorvastatin (LIPITOR) 10 mg tablet TAKE 1 TABLET DAILY 90 tablet 1    estradiol (ESTRACE) 0.1 mg/g vaginal cream Insert into the vagina Takes weekly on Monday evenings      fluticasone (FLONASE) 50 mcg/act nasal spray 1 spray into each nostril daily 48 g 1    oxaprozin (DAYPRO) 600 MG tablet Take 1 tablet (600 mg total) by mouth 2 (two) times a day as needed (Pain) 60 tablet 0    polyethylene glycol (MIRALAX) 17 g packet Take 17 g by mouth daily as needed (constipation)  0     No current facility-administered medications for this  "visit.       Objective:    /80   Pulse (!) 52   Temp (!) 97.1 °F (36.2 °C)   Resp 18   Ht 5' 4\" (1.626 m)   Wt 80.9 kg (178 lb 6.4 oz)   BMI 30.62 kg/m²        Physical Exam  Musculoskeletal:      Comments: Rt foot swollen                Frank Lombardi, DO  "

## 2024-10-25 ENCOUNTER — TELEPHONE (OUTPATIENT)
Dept: GASTROENTEROLOGY | Facility: CLINIC | Age: 69
End: 2024-10-25

## 2024-11-13 ENCOUNTER — TELEPHONE (OUTPATIENT)
Age: 69
End: 2024-11-13

## 2024-11-13 ENCOUNTER — PREP FOR PROCEDURE (OUTPATIENT)
Age: 69
End: 2024-11-13

## 2024-11-13 DIAGNOSIS — Z12.11 SCREENING FOR COLON CANCER: Primary | ICD-10-CM

## 2024-11-13 NOTE — TELEPHONE ENCOUNTER
24  Screened by: Stephanie Stiles MA    Referring Provider     Pre- Screenin'4  170 lbs  BMI 29.2  Has patient been referred for a routine screening Colonoscopy? yes  Is the patient between 45-75 years old? yes      Previous Colonoscopy yes   If yes:    Date: 14    Facility: [facility]    Reason: Screening      Does the patient want to see a Gastroenterologist prior to their procedure OR are they having any GI symptoms? no    Has the patient been hospitalized or had abdominal surgery in the past 6 months? no    Does the patient use supplemental oxygen? no    Does the patient take Coumadin, Lovenox, Plavix, Elliquis, Xarelto, or other blood thinning medication? no    Has the patient had a stroke, cardiac event, or stent placed in the past year? no      If patient is between 45yrs - 49yrs, please advise patient that we will have to confirm benefits & coverage with their insurance company for a routine screening colonoscopy.

## 2024-11-13 NOTE — TELEPHONE ENCOUNTER
Scheduled date of colonoscopy (as of today): 2/3/25  Physician performing colonoscopy:   Location of colonoscopy: [facility]  Bowel prep reviewed with patient: Miralax Dulcolax prep instructions reviewed and sent via "[employer]  Instructions reviewed with patient by: md  Clearances:

## 2024-11-13 NOTE — LETTER
November 13, 2024    Maira Colby  [address] [place] [id number]      Dear Ms. Colby:    Below are your prep instructions for your upcoming procedure on 2/3/25. If you have any questions or concerns please contact us at [phone].    Thank you,     [facility]            Medicine Instructions for Adults with Diabetes who Need a Bowel Prep       Follow these instructions when a BOWEL PREP is required for your procedure or surgery!    NOTE:   GLP-1 Agonists taken weekly: do not take in the 7 days before your procedure   SGLT-2 Inhibitors: do not take in the 4 days before your procedure     On the Day Before Surgery/Procedure  If you are having a procedure (e.g. Colonoscopy) or surgery that requires a bowel prep and you may have at least a clear liquid diet, follow the directions below based on the type of medicine you take for your diabetes.     Type of Medicine You Take Examples What to do   Pre-Mixed Insulin - Intermediate Acting Humalog® 75/25, Humulin® 70/30, Novolog® 70/30, Regular Insulin Take ½ your regular dose the evening before your procedure   Rapid/Fast Acting Insulin Humalog® U200, NovoLog®, Apidra®, Fiasp® Take ½ your regular dose the evening before your procedure.   Long-Acting Insulin Lantus®, Levemir®, Tresiba®, Toujeo®, Basaglar® Take your FULL regular dose the day before procedure   Oral Sulfonylurea Glipizide/Glimepiride/Glucotrol® Take ½ your regular dose the evening before your procedure   Other Oral Diabetes Medicines Metformin®, Glucophage®, Glucophage XR®, Riomet®, Glumetza®), Actose®, Avandia®, Glyset®, Prandin® Take your regular dose with dinner in the evening before your procedure   GLP-1 Agonists AdlyxinÒ, ByettaÒ, BydureonÒ, OzempicÒ, SoliquaÒ, TanzeumÒ, TrulicityÒ, VictozaÒ, Saxenda®, Rybelsus® If taken daily, take as normal    If taken weekly, do not take this medicine for 7 days before your procedure including the  day of the procedure (resume taking after the procedure)   SGLT-2 Inhibitors Jardiance®, Invokana®, Farxiga®,   Steglatro®, Brenzavvy®, Qtern®, Segluromet®, Glyxambi®, Synjardy®, Synjardy XR®, Invokamet®, Invokamet XR®, Trijary XR®, Xigduo XR®, Steglujan® Do not take for 4 days before your procedure including the day of the procedure (resume taking after the procedure)                More information continued on back                    Medicine Instructions for Adults with Diabetes who Need a Bowel Prep  Page 2      On the Day of Surgery/Procedure  Follow the directions below based on the type of medicine you take for your diabetes.     Type of Medicine You Take Examples What to do   Long-Acting Insulin Lantus®, Levemir®, Tresiba®, Toujeo®, Basaglar®, Semglee®   If you usually take your Long-Acting Insulin in the morning, take the full dose as scheduled.   GLP-1 Agonists AdlyxinÒ, ByettaÒ, BydureonÒ, OzempicÒ, SoliquaÒ, TanzeumÒ, TrulicityÒ, VictozaÒ, Saxenda®, Rybelsus® Do NOT take this medicine on the day of your procedure (resume taking after the procedure)       On the Day of Surgery/Procedure (continued)  Except for the morning Long-Acting Insulin, DO NOT take ANY diabetic medicine on the day of your procedure unless you were instructed by the doctor who manages your diabetes medicines.    Continue to check your blood sugars.  If you have an insulin pump, ask your endocrinologist for instructions at least 3 days before your procedure. NOTE: If you are not able to ask your endocrinologist in advance, on the day of the procedure set your insulin pump to your basal rate only. Bring your insulin pump supplies to the hospital.     If you have any questions about taking your diabetes medicines prior to your procedure, please contact the doctor who manages your diabetes medicines.

## 2024-11-19 DIAGNOSIS — D32.9 MENINGIOMA (HCC): ICD-10-CM

## 2024-11-19 DIAGNOSIS — Z82.49 FAMILY HISTORY OF HEART DISEASE: ICD-10-CM

## 2024-11-19 DIAGNOSIS — R94.39 ABNORMAL STRESS ECG WITH TREADMILL: ICD-10-CM

## 2024-11-19 DIAGNOSIS — I10 PRIMARY HYPERTENSION: ICD-10-CM

## 2024-11-19 DIAGNOSIS — R01.1 CARDIAC MURMUR: ICD-10-CM

## 2024-11-19 DIAGNOSIS — E78.2 MIXED HYPERLIPIDEMIA: ICD-10-CM

## 2024-11-20 RX ORDER — AMLODIPINE BESYLATE 2.5 MG/1
2.5 TABLET ORAL DAILY
Qty: 90 TABLET | Refills: 0 | Status: SHIPPED | OUTPATIENT
Start: 2024-11-20

## 2024-12-19 ENCOUNTER — OFFICE VISIT (OUTPATIENT)
Dept: CARDIOLOGY CLINIC | Facility: CLINIC | Age: 69
End: 2024-12-19
Payer: COMMERCIAL

## 2024-12-19 VITALS
HEIGHT: 64 IN | SYSTOLIC BLOOD PRESSURE: 124 MMHG | OXYGEN SATURATION: 98 % | WEIGHT: 166 LBS | BODY MASS INDEX: 28.34 KG/M2 | HEART RATE: 53 BPM | DIASTOLIC BLOOD PRESSURE: 80 MMHG

## 2024-12-19 DIAGNOSIS — R01.1 CARDIAC MURMUR: ICD-10-CM

## 2024-12-19 DIAGNOSIS — E78.2 MIXED HYPERLIPIDEMIA: ICD-10-CM

## 2024-12-19 DIAGNOSIS — D32.9 MENINGIOMA (HCC): ICD-10-CM

## 2024-12-19 DIAGNOSIS — Z82.49 FAMILY HISTORY OF HEART DISEASE: ICD-10-CM

## 2024-12-19 DIAGNOSIS — I10 PRIMARY HYPERTENSION: ICD-10-CM

## 2024-12-19 PROCEDURE — 93000 ELECTROCARDIOGRAM COMPLETE: CPT | Performed by: INTERNAL MEDICINE

## 2024-12-19 PROCEDURE — 99214 OFFICE O/P EST MOD 30 MIN: CPT | Performed by: INTERNAL MEDICINE

## 2024-12-19 NOTE — PROGRESS NOTES
Progress note.- Cardiology Office  [facility].    Maira Colby 69 y.o. female MRN: [id number]  : 1955  Unit/Bed#:  Encounter: [id number]      Assessment:     1. Primary hypertension    2. Mixed hyperlipidemia    3. Family history of heart disease    4. Cardiac murmur    5. Meningioma (HCC)        Discussion summary and Plan:     1.  Mixed hyperlipidemia.  Continue statins.  Blood test in 2022 are acceptable repeat blood test ordered.    2.  Strong family history of heart disease with mother having MI at the age of 44 and father had coronary artery disease.  Her EKG stress test was abnormal.  Her nuclear stress test shows no ischemia.  She has normal EF.  3.  Cardiac murmur echo Doppler shows no evidence of anything valvular disease.    4.  History of recently diagnosed meningioma thought to be benign follows with neurosurgery        Plan.  Patient is doing well from cardiac point of view.  Will follow her in 1 year.   routine labs ordered.      Patient  was advised and educated to call our office  immediately if  patient has any new symptoms of chest pain/shortness of breath, near-syncope, syncope, light headedness sustained palpitations or any other cardiovascular symptoms before their scheduled follow-up appointment.  Office number was provided #[phone].      Thank you for your consultation.  If you have any question please call me at [phone]    Counseling :  A description of the counseling.  Goals and Barriers.  Patient's ability to self care: Yes  Medication side effect reviewed with patient in detail and all their questions answered to their satisfaction.      Primary Care Physician: Adeline Gamboa MD          HPI :     Maira Colby is a 69 y.o. year old female who was referred by primary care doctor for due to cardiac risk factors of dyslipidemia and strong family history of heart disease.  Patient's mother passed away from heart disease at the age of 44  and her father had heart problems.  She has a medical history significant for dyslipidemia on statins, anxiety, chronic allergies was recently having issues with her eye vision when a CAT scan was read as abnormal.  She became very anxious later on she was diagnosed to have meningioma which are thought to be benign.  She had a strong family history of heart disease in view of her multiple risk factors and possible intervention she was sent to us.  She feels well now denies any chest pain any shortness of breath and she is pretty compliant with her medication.  Occasionally she has to take antiallergy and congestion medications and she has been compliant with her cholesterol therapy.  Her last blood test in February 2023 shows her cholesterol profiles, electrolytes and hemoglobin and vitals has been stable.  No recent surgery.    She is  she lives with her  and she has adult kids.    5/18/2023.    Reviewed.  Patient came for follow-up.  She has recently exercise stress test done which shows patient has hypertensive response to exercise as well as EKG changes of 1.5 to 2 mm inferior lateral.  EKG reviewed.  Patient has no symptoms of chest pain at the time but EKG changes were noted.  She had history of heart problem in the family, dyslipidemia and she gets anxious very easily.  She does have history of meningioma.  She feels well she came with her daughter.  She agreed with noninvasive approach.      12/19/2024.    Above reviewed.  Patient came for follow-up.  She has a medical history significant for hypertensive heart disease, equivocal stress EKG test, history of dyslipidemia, anxiety, meningioma who came for follow-up.  Due to her abnormal stress EKG test she underwent nuclear stress test in June 2023 which shows EKG changes but perfusion abnormality was not noted and her EF was 74%.  Her EKG today shows sinus rhythm heart rate 53 bpm.  She feels well no chest pain no shortness of breath no  dizziness no lightheadedness no other cardiovascular issues.  Her blood test from February 2024 acceptable she has not done cholesterol testing in 2024.    Review of Systems   Constitutional:  Negative for activity change, chills, diaphoresis, fever and unexpected weight change.   HENT:  Negative for congestion.    Eyes:  Negative for discharge and redness.   Respiratory:  Negative for cough, chest tightness, shortness of breath and wheezing.    Cardiovascular: Negative.  Negative for chest pain, palpitations and leg swelling.   Gastrointestinal:  Negative for abdominal pain, diarrhea and nausea.   Endocrine: Negative.    Genitourinary:  Negative for decreased urine volume and urgency.   Musculoskeletal: Negative.  Negative for arthralgias, back pain and gait problem.   Skin:  Negative for rash and wound.   Allergic/Immunologic: Negative.    Neurological:  Negative for dizziness, seizures, syncope, weakness, light-headedness and headaches.   Hematological: Negative.    Psychiatric/Behavioral:  Negative for agitation and confusion. The patient is not nervous/anxious.        Historical Information   Past Medical History:   Diagnosis Date   • Anxiety    • Cellulitis    • Chronic rhinitis     Last Assessed:6/6/2016   • Colon polyp    • GERD without esophagitis     Last Assessed:6/6/2016   • Hyperlipidemia    • Hypometabolism    • Vitamin D deficiency      Past Surgical History:   Procedure Laterality Date   • COLONOSCOPY     • IA CRNEC TREPHINE BONE FLAP MENINGIOMA SUPRATENTOR Left 11/1/2023    Procedure: Left frontal CRANIOTOMY IMAGE-GUIDED FOR TUMOR;  Surgeon: Sigifredo Sagastume MD;  [facility];  Service: Neurosurgery   • IA NEUROPLASTY &/TRANSPOS MEDIAN NRV CARPAL TUNNE Left 3/1/2024    Procedure: RELEASE CARPAL TUNNEL;  Surgeon: Man Durant MD;  Location:  MAIN OR;  Service: Orthopedics   • IA OPTX DSTL RADL I-ARTIC FX/EPIPHYSL SEP 3 FRAG Left 3/1/2024    Procedure: OPEN REDUCTION W/ INTERNAL  FIXATION (ORIF) RADIUS / ULNA (WRIST);  Surgeon: Man Durant MD;  [facility];  Service: Orthopedics   • TONSILLECTOMY       Social History     Substance and Sexual Activity   Alcohol Use Yes    Comment: social/anxiety     Social History     Substance and Sexual Activity   Drug Use Not Currently   • Types: Marijuana    Comment: Previously     Social History     Tobacco Use   Smoking Status Former   • Current packs/day: 0.00   • Average packs/day: 1.5 packs/day for 30.0 years (45.1 ttl pk-yrs)   • Types: Cigarettes   • Start date: 6/15/1987   • Quit date: 2008   • Years since quittin.9   • Passive exposure: Past   Smokeless Tobacco Never     Family History:   Family History   Problem Relation Age of Onset   • Atrial fibrillation Mother    • Coronary artery disease Mother    • Atrial fibrillation Father    • Hypertension Father    • Breast cancer Maternal Grandmother 60       Meds/Allergies     Allergies   Allergen Reactions   • Sulfa Antibiotics Edema   • Other Rash   • Penicillins Rash   • Red Yeast Rice Extract - Food Allergy Rash       Current Outpatient Medications:   •  acetaminophen (TYLENOL) 325 mg tablet, Take 3 tablets (975 mg total) by mouth every 8 (eight) hours as needed for mild pain, Disp: , Rfl: 0  •  amLODIPine (NORVASC) 2.5 mg tablet, TAKE 1 TABLET DAILY, Disp: 90 tablet, Rfl: 0  •  Ascorbic Acid (VITAMIN C) 100 MG tablet, Take 100 mg by mouth daily, Disp: , Rfl:   •  atorvastatin (LIPITOR) 10 mg tablet, TAKE 1 TABLET DAILY, Disp: 90 tablet, Rfl: 1  •  estradiol (ESTRACE) 0.1 mg/g vaginal cream, Insert into the vagina Takes weekly on Monday evenings, Disp: , Rfl:   •  fluticasone (FLONASE) 50 mcg/act nasal spray, 1 spray into each nostril daily, Disp: 48 g, Rfl: 1  •  polyethylene glycol (MIRALAX) 17 g packet, Take 17 g by mouth daily as needed (constipation), Disp: , Rfl: 0  •  oxaprozin (DAYPRO) 600 MG tablet, Take 1 tablet (600 mg total) by mouth 2 (two) times a day as  "needed (Pain), Disp: 60 tablet, Rfl: 0    Vitals: Blood pressure 124/80, pulse (!) 53, height 5' 4\" (1.626 m), weight 75.3 kg (166 lb), SpO2 98%, not currently breastfeeding.    Body mass index is 28.49 kg/m².  Wt Readings from Last 3 Encounters:   12/19/24 75.3 kg (166 lb)   10/08/24 80.9 kg (178 lb 6.4 oz)   09/04/24 81.6 kg (180 lb)     Vitals:    12/19/24 1627   Weight: 75.3 kg (166 lb)       BP Readings from Last 3 Encounters:   12/19/24 124/80   10/08/24 148/80   09/04/24 153/85         Physical Exam  Constitutional:       General: She is not in acute distress.     Appearance: She is well-developed. She is not diaphoretic.   Neck:      Thyroid: No thyromegaly.      Vascular: No JVD.      Trachea: No tracheal deviation.   Cardiovascular:      Rate and Rhythm: Normal rate and regular rhythm.      Heart sounds: S1 normal and S2 normal. Heart sounds not distant. Murmur heard.      Systolic (ejection) murmur is present with a grade of 2/6.      No friction rub. No gallop. No S3 or S4 sounds.   Pulmonary:      Effort: Pulmonary effort is normal. No respiratory distress.      Breath sounds: Normal breath sounds. No wheezing or rales.   Chest:      Chest wall: No tenderness.   Abdominal:      General: Bowel sounds are normal. There is no distension.      Palpations: Abdomen is soft.      Tenderness: There is no abdominal tenderness.   Musculoskeletal:         General: No deformity.      Cervical back: Neck supple.   Skin:     General: Skin is warm and dry.      Coloration: Skin is not pale.      Findings: No rash.   Neurological:      Mental Status: She is alert and oriented to person, place, and time.   Psychiatric:         Behavior: Behavior normal.         Judgment: Judgment normal.             Diagnostic Studies Review Cardio:    Echo Doppler.  Echo Doppler done on 5/17/2022 shows EF 60%, trace valvular disease.    Exercise stress test was abnormal there was 1.5 to 2 mm inferior lateral ST depression at peak " "stress with sluggish recovery.  Noted to have marked blood pressure response to exercise.  No symptoms    Nuclear stress test done in 2023 shows no significant perfusion abnormality EKG changes was noted as before.      EK EKG done on 2023 shows normal sinus rhythm heart rate 69 bpm.  No significant abnormality normal intervals    Twelve-lead EKG done 2024 shows    Lab Review   Lab Results   Component Value Date    WBC 10.20 (H) 2023    HGB 12.0 2023    HCT 34.2 (L) 2023    MCV 94 2023    RDW 12.3 2023     2023     BMP:  Lab Results   Component Value Date    SODIUM 140 2024    K 3.9 2024     2024    CO2 27 2024    BUN 10 2024    CREATININE 0.61 2024    GLUC 86 2024    CALCIUM 9.7 2024    EGFR 94 2024     Troponins:    LFT:  Lab Results   Component Value Date    AST 24 10/18/2023    ALT 24 10/18/2023    ALKPHOS 91 02/10/2023    TP 6.7 10/18/2023    ALB 4.3 10/18/2023      Lipid Profile:   Lab Results   Component Value Date    CHOLESTEROL 194 2023    HDL 74 2023    LDLCALC 103 (H) 2023    TRIG 97 2023     Lab Results   Component Value Date    CHOLESTEROL 194 2023    CHOLESTEROL 172 2022           Dr. Evelio Guadalupe MD [facility]      \"This note was completed in part utilizing m-modal fluency direct voice recognition software.   Grammatical errors, random word insertion, spelling mistakes, and incomplete sentences may be an occasional consequence of the system secondary to software limitations, ambient noise and hardware issues.    Please read the chart carefully and recognize, using context, where substitutions have occurred.  If you have any questions or concerns about the context, text or information contained within the body of this dictation, please contact myself, the provider, for further clarification.\"  "

## 2025-01-03 ENCOUNTER — TELEPHONE (OUTPATIENT)
Age: 70
End: 2025-01-03

## 2025-01-03 DIAGNOSIS — J32.9 CHRONIC SINUSITIS, UNSPECIFIED LOCATION: ICD-10-CM

## 2025-01-03 RX ORDER — FLUTICASONE PROPIONATE 50 MCG
1 SPRAY, SUSPENSION (ML) NASAL DAILY
Qty: 48 G | Refills: 0 | Status: SHIPPED | OUTPATIENT
Start: 2025-01-03

## 2025-01-03 NOTE — TELEPHONE ENCOUNTER
Pt. Called for an appt. And there are none aval. Today.  She would like to know if the doctor could send in a refill for her nasal spray.

## 2025-01-13 ENCOUNTER — TELEPHONE (OUTPATIENT)
Age: 70
End: 2025-01-13

## 2025-01-13 NOTE — TELEPHONE ENCOUNTER
Patient calling regarding prep / Able to help patient locate in MyChart/ Patient did receive expressing verbal understanding

## 2025-01-19 ENCOUNTER — ANESTHESIA (OUTPATIENT)
Dept: ANESTHESIOLOGY | Facility: HOSPITAL | Age: 70
End: 2025-01-19

## 2025-01-19 ENCOUNTER — ANESTHESIA EVENT (OUTPATIENT)
Dept: ANESTHESIOLOGY | Facility: HOSPITAL | Age: 70
End: 2025-01-19

## 2025-01-29 ENCOUNTER — TELEPHONE (OUTPATIENT)
Dept: GASTROENTEROLOGY | Facility: CLINIC | Age: 70
End: 2025-01-29

## 2025-01-29 DIAGNOSIS — E78.2 MIXED HYPERLIPIDEMIA: ICD-10-CM

## 2025-01-29 RX ORDER — ATORVASTATIN CALCIUM 10 MG/1
10 TABLET, FILM COATED ORAL DAILY
Qty: 90 TABLET | Refills: 0 | Status: SHIPPED | OUTPATIENT
Start: 2025-01-29

## 2025-01-29 NOTE — TELEPHONE ENCOUNTER
I spoke to pt and she has her instructions and a . She will await a call for her arrival time for her  procedure  on 2/3/25.  She is aware of the clear liquid diet and split dose prep. Sb

## 2025-02-01 RX ORDER — SODIUM CHLORIDE, SODIUM LACTATE, POTASSIUM CHLORIDE, CALCIUM CHLORIDE 600; 310; 30; 20 MG/100ML; MG/100ML; MG/100ML; MG/100ML
125 INJECTION, SOLUTION INTRAVENOUS CONTINUOUS
Status: CANCELLED | OUTPATIENT
Start: 2025-02-01

## 2025-02-03 ENCOUNTER — ANESTHESIA EVENT (OUTPATIENT)
Dept: GASTROENTEROLOGY | Facility: AMBULARY SURGERY CENTER | Age: 70
End: 2025-02-03
Payer: COMMERCIAL

## 2025-02-03 ENCOUNTER — HOSPITAL ENCOUNTER (OUTPATIENT)
Dept: GASTROENTEROLOGY | Facility: AMBULARY SURGERY CENTER | Age: 70
Setting detail: OUTPATIENT SURGERY
Discharge: HOME | End: 2025-02-03
Attending: INTERNAL MEDICINE
Payer: COMMERCIAL

## 2025-02-03 VITALS
HEIGHT: 64 IN | OXYGEN SATURATION: 100 % | DIASTOLIC BLOOD PRESSURE: 69 MMHG | HEART RATE: 59 BPM | WEIGHT: 159 LBS | BODY MASS INDEX: 27.14 KG/M2 | SYSTOLIC BLOOD PRESSURE: 127 MMHG | TEMPERATURE: 96.9 F | RESPIRATION RATE: 16 BRPM

## 2025-02-03 DIAGNOSIS — Z12.11 SCREENING FOR COLON CANCER: ICD-10-CM

## 2025-02-03 PROCEDURE — G0121 COLON CA SCRN NOT HI RSK IND: HCPCS | Performed by: INTERNAL MEDICINE

## 2025-02-03 RX ORDER — SODIUM CHLORIDE, SODIUM LACTATE, POTASSIUM CHLORIDE, CALCIUM CHLORIDE 600; 310; 30; 20 MG/100ML; MG/100ML; MG/100ML; MG/100ML
INJECTION, SOLUTION INTRAVENOUS CONTINUOUS PRN
Status: DISCONTINUED | OUTPATIENT
Start: 2025-02-03 | End: 2025-02-03

## 2025-02-03 RX ORDER — PROPOFOL 10 MG/ML
INJECTION, EMULSION INTRAVENOUS AS NEEDED
Status: DISCONTINUED | OUTPATIENT
Start: 2025-02-03 | End: 2025-02-03

## 2025-02-03 RX ORDER — ONDANSETRON 2 MG/ML
4 INJECTION INTRAMUSCULAR; INTRAVENOUS ONCE AS NEEDED
Status: CANCELLED | OUTPATIENT
Start: 2025-02-03

## 2025-02-03 RX ORDER — PROPOFOL 10 MG/ML
INJECTION, EMULSION INTRAVENOUS CONTINUOUS PRN
Status: DISCONTINUED | OUTPATIENT
Start: 2025-02-03 | End: 2025-02-03

## 2025-02-03 RX ORDER — LIDOCAINE HYDROCHLORIDE 10 MG/ML
INJECTION, SOLUTION EPIDURAL; INFILTRATION; INTRACAUDAL; PERINEURAL AS NEEDED
Status: DISCONTINUED | OUTPATIENT
Start: 2025-02-03 | End: 2025-02-03

## 2025-02-03 RX ORDER — SODIUM CHLORIDE, SODIUM LACTATE, POTASSIUM CHLORIDE, CALCIUM CHLORIDE 600; 310; 30; 20 MG/100ML; MG/100ML; MG/100ML; MG/100ML
125 INJECTION, SOLUTION INTRAVENOUS CONTINUOUS
Status: DISCONTINUED | OUTPATIENT
Start: 2025-02-03 | End: 2025-02-07 | Stop reason: HOSPADM

## 2025-02-03 RX ADMIN — SODIUM CHLORIDE, SODIUM LACTATE, POTASSIUM CHLORIDE, AND CALCIUM CHLORIDE: .6; .31; .03; .02 INJECTION, SOLUTION INTRAVENOUS at 07:31

## 2025-02-03 RX ADMIN — PROPOFOL 120 MCG/KG/MIN: 10 INJECTION, EMULSION INTRAVENOUS at 07:36

## 2025-02-03 RX ADMIN — SODIUM CHLORIDE, SODIUM LACTATE, POTASSIUM CHLORIDE, AND CALCIUM CHLORIDE 125 ML/HR: .6; .31; .03; .02 INJECTION, SOLUTION INTRAVENOUS at 07:15

## 2025-02-03 RX ADMIN — LIDOCAINE HYDROCHLORIDE 50 MG: 10 INJECTION, SOLUTION EPIDURAL; INFILTRATION; INTRACAUDAL; PERINEURAL at 07:36

## 2025-02-03 RX ADMIN — PROPOFOL 60 MG: 10 INJECTION, EMULSION INTRAVENOUS at 07:41

## 2025-02-03 RX ADMIN — PROPOFOL 100 MG: 10 INJECTION, EMULSION INTRAVENOUS at 07:36

## 2025-02-03 NOTE — PERIOPERATIVE NURSING NOTE
Patient brought from procedure to phase two. Sbar given at bedside to GI rn Katharina BLACKMAN . Patient resting in bed, bed locked in lowest position with side rails raise, call light in reach and environment cleared. Plan of care ongoing.

## 2025-02-03 NOTE — NURSING NOTE
Pt D/C to home. D/C instructions reviewed with TITI Gonzalez (paper copy provided). Per pt, no questions or concerns. Pt was able to ambulate to ride w/o difficulty

## 2025-02-03 NOTE — ANESTHESIA POSTPROCEDURE EVALUATION
Post-Op Assessment Note    CV Status:  Stable  Pain Score: 0    Pain management: adequate       Mental Status:  Sleepy   Hydration Status:  Euvolemic   PONV Controlled:  Controlled   Airway Patency:  Patent     Post Op Vitals Reviewed: Yes    No anethesia notable event occurred.    Staff: Anesthesiologist, CRNA           Last Filed PACU Vitals:  Vitals Value Taken Time   Temp     Pulse 62 02/03/25 0750   /66 02/03/25 0750   Resp 12 02/03/25 0750   SpO2 100 % 02/03/25 0750

## 2025-02-03 NOTE — ANESTHESIA PREPROCEDURE EVALUATION
Procedure:  COLONOSCOPY    Relevant Problems   ANESTHESIA (within normal limits)      CARDIO   (+) Mixed hyperlipidemia   (+) Primary hypertension      GI/HEPATIC   (+) GERD (gastroesophageal reflux disease)      MUSCULOSKELETAL   (+) Lateral epicondylitis of left elbow      PULMONARY (within normal limits)        Physical Exam    Airway    Mallampati score: II  TM Distance: >3 FB  Neck ROM: full     Dental   No notable dental hx     Cardiovascular  Cardiovascular exam normal    Pulmonary  Pulmonary exam normal     Other Findings  post-pubertal.      Anesthesia Plan  ASA Score- 2     Anesthesia Type- IV sedation with anesthesia with ASA Monitors.         Additional Monitors:     Airway Plan:            Plan Factors-Exercise tolerance (METS): >4 METS.    Chart reviewed. EKG reviewed.   Patient summary reviewed.                  Induction-     Postoperative Plan-         Informed Consent- Anesthetic plan and risks discussed with patient.  I personally reviewed this patient with the CRNA. Discussed and agreed on the Anesthesia Plan with the CRNA..      NPO Status:  Vitals Value Taken Time   Date of last liquid 02/03/25 02/03/25 0705   Time of last liquid 0445 02/03/25 0705   Date of last solid 02/01/25 02/03/25 0705   Time of last solid 1800 02/03/25 0705

## 2025-02-03 NOTE — H&P
History and Physical - [facility]  Maira Colby 69 y.o. female MRN: [id number]                  HPI: Maira Colby is a 69 y.o. year old female who presents for history of colon polyps      REVIEW OF SYSTEMS: Per the HPI, and otherwise unremarkable.    Historical Information   Past Medical History:   Diagnosis Date    Anxiety     Cellulitis     Chronic rhinitis     Last Assessed:2016    Colon polyp     GERD without esophagitis     Last Assessed:2016    Hyperlipidemia     Hypometabolism     Vitamin D deficiency      Past Surgical History:   Procedure Laterality Date    COLONOSCOPY      SD CRNEC TREPH BONE FLAP CRNOT EXC MENINGIOMA STTL Left 2023    Procedure: Left frontal CRANIOTOMY IMAGE-GUIDED FOR TUMOR;  Surgeon: Sigifredo Sagastume MD;  [facility];  Service: Neurosurgery    SD NEUROPLASTY &/TRANSPOS MEDIAN NRV CARPAL TUNNE Left 3/1/2024    Procedure: RELEASE CARPAL TUNNEL;  Surgeon: Man Durant MD;  [facility];  Service: Orthopedics    SD OPTX DSTL RADL I-ARTIC FX/EPIPHYSL SEP 3+ FRAG Left 3/1/2024    Procedure: OPEN REDUCTION W/ INTERNAL FIXATION (ORIF) RADIUS / ULNA (WRIST);  Surgeon: Man Durant MD;  [facility];  Service: Orthopedics    TONSILLECTOMY       Social History   Social History     Substance and Sexual Activity   Alcohol Use Yes    Comment: social/anxiety     Social History     Substance and Sexual Activity   Drug Use Not Currently    Types: Marijuana    Comment: Previously     Social History     Tobacco Use   Smoking Status Former    Current packs/day: 0.00    Average packs/day: 1.5 packs/day for 30.0 years (45.1 ttl pk-yrs)    Types: Cigarettes    Start date: 6/15/1987    Quit date: 2008    Years since quittin.1    Passive exposure: Past   Smokeless Tobacco Never     Family History   Problem Relation Age of Onset    Atrial fibrillation Mother     Coronary artery disease Mother     Atrial fibrillation  "Father     Hypertension Father     Breast cancer Maternal Grandmother 60       Meds/Allergies       Current Outpatient Medications:     amLODIPine (NORVASC) 2.5 mg tablet    Ascorbic Acid (VITAMIN C) 100 MG tablet    atorvastatin (LIPITOR) 10 mg tablet    calcium carbonate (OS-NEO) 600 MG tablet    cholecalciferol (VITAMIN D3) 400 units tablet    estradiol (ESTRACE) 0.1 mg/g vaginal cream    fluticasone (FLONASE) 50 mcg/act nasal spray    Multiple Vitamin (MULTIVITAMIN PO)    Omega-3 Fatty Acids (Fish Oil) 300 MG CAPS    polyethylene glycol (MIRALAX) 17 g packet    Current Facility-Administered Medications:     lactated ringers infusion, 125 mL/hr, Intravenous, Continuous, 125 mL/hr at 02/03/25 0715    Allergies   Allergen Reactions    Sulfa Antibiotics Edema    Other Rash    Penicillins Rash    Red Yeast Rice Extract - Food Allergy Rash       Objective     /67   Pulse 65   Temp (!) 96.9 °F (36.1 °C) (Temporal)   Resp 18   Ht 5' 4\" (1.626 m)   Wt 72.1 kg (159 lb)   SpO2 99%   BMI 27.29 kg/m²       PHYSICAL EXAM    Gen: NAD  Head: NCAT  CV: RRR  CHEST: Clear  ABD: soft, NT/ND  EXT: no edema      ASSESSMENT/PLAN:  This is a 69 y.o. year old female here for colonoscopy, and she is stable and optimized for her procedure.        "

## 2025-02-03 NOTE — NURSING NOTE
Pt in phase 2. Report received from TITI montero and AVI Lazo. Call bell within reach, bed in lowest position and locked, side rails up, this RN at bedside

## 2025-02-17 DIAGNOSIS — I10 PRIMARY HYPERTENSION: ICD-10-CM

## 2025-02-17 DIAGNOSIS — R94.39 ABNORMAL STRESS ECG WITH TREADMILL: ICD-10-CM

## 2025-02-17 DIAGNOSIS — R01.1 CARDIAC MURMUR: ICD-10-CM

## 2025-02-17 DIAGNOSIS — D32.9 MENINGIOMA (HCC): ICD-10-CM

## 2025-02-17 DIAGNOSIS — E78.2 MIXED HYPERLIPIDEMIA: ICD-10-CM

## 2025-02-17 DIAGNOSIS — Z82.49 FAMILY HISTORY OF HEART DISEASE: ICD-10-CM

## 2025-02-18 RX ORDER — AMLODIPINE BESYLATE 2.5 MG/1
2.5 TABLET ORAL DAILY
Qty: 90 TABLET | Refills: 1 | Status: SHIPPED | OUTPATIENT
Start: 2025-02-18

## 2025-02-20 ENCOUNTER — OFFICE VISIT (OUTPATIENT)
Dept: FAMILY MEDICINE CLINIC | Facility: CLINIC | Age: 70
End: 2025-02-20
Payer: COMMERCIAL

## 2025-02-20 VITALS
TEMPERATURE: 97.6 F | DIASTOLIC BLOOD PRESSURE: 80 MMHG | HEART RATE: 68 BPM | OXYGEN SATURATION: 98 % | BODY MASS INDEX: 28 KG/M2 | HEIGHT: 63 IN | WEIGHT: 158 LBS | RESPIRATION RATE: 18 BRPM | SYSTOLIC BLOOD PRESSURE: 126 MMHG

## 2025-02-20 DIAGNOSIS — E78.2 MIXED HYPERLIPIDEMIA: ICD-10-CM

## 2025-02-20 DIAGNOSIS — J32.9 CHRONIC SINUSITIS, UNSPECIFIED LOCATION: ICD-10-CM

## 2025-02-20 DIAGNOSIS — Z00.00 WELL ADULT EXAM: Primary | ICD-10-CM

## 2025-02-20 DIAGNOSIS — L30.9 DERMATITIS: ICD-10-CM

## 2025-02-20 PROBLEM — D32.9 MENINGIOMA (HCC): Status: RESOLVED | Noted: 2023-02-15 | Resolved: 2025-02-20

## 2025-02-20 PROCEDURE — 99397 PER PM REEVAL EST PAT 65+ YR: CPT | Performed by: INTERNAL MEDICINE

## 2025-02-20 RX ORDER — ATORVASTATIN CALCIUM 10 MG/1
10 TABLET, FILM COATED ORAL DAILY
Qty: 90 TABLET | Refills: 3 | Status: SHIPPED | OUTPATIENT
Start: 2025-02-20

## 2025-02-20 RX ORDER — CLOBETASOL PROPIONATE 0.5 MG/G
CREAM TOPICAL 2 TIMES DAILY
Qty: 45 G | Refills: 1 | Status: SHIPPED | OUTPATIENT
Start: 2025-02-20

## 2025-02-20 RX ORDER — FLUTICASONE PROPIONATE 50 MCG
1 SPRAY, SUSPENSION (ML) NASAL DAILY
Qty: 48 G | Refills: 3 | Status: SHIPPED | OUTPATIENT
Start: 2025-02-20

## 2025-02-20 NOTE — PROGRESS NOTES
FAMILY PRACTICE HEALTH MAINTENANCE OFFICE VISIT  [facility]    NAME: Maira Colby  AGE: 69 y.o. SEX: female  : 1955     DATE: 2025    Assessment and Plan     1. Well adult exam  2. Dermatitis  -     clobetasol (TEMOVATE) 0.05 % cream; Apply topically 2 (two) times a day  3. Mixed hyperlipidemia  -     atorvastatin (LIPITOR) 10 mg tablet; Take 1 tablet (10 mg total) by mouth daily  4. Chronic sinusitis, unspecified location  -     fluticasone (FLONASE) 50 mcg/act nasal spray; 1 spray into each nostril daily    Patient Counseling:   Nutrition: Stressed importance of a well balanced diet, moderation of sodium/saturated fat, caloric balance and sufficient intake of fiber  Exercise: Stressed the importance of regular exercise with a goal of 150 minutes per week  Dental Health: Discussed daily flossing and brushing and regular dental visits     Immunizations reviewed: Up To Date  Discussed benefits of:  Colon Cancer Screening, Mammogram , and Screening labs.  BMI Counseling: Body mass index is 27.99 kg/m². Discussed with patient's BMI with her. The BMI is above normal. Nutrition recommendations include reducing portion sizes and decreasing overall calorie intake. Exercise recommendations include moderate aerobic physical activity for 150 minutes/week.    Return in about 1 year (around 2026) for Annual physical.        Chief Complaint     Chief Complaint   Patient presents with   • Physical Exam     [id number]       History of Present Illness     Here for CPE.  She is doing weight watchers and has lost 22 pounds.          Well Adult Physical   Patient here for a comprehensive physical exam.      Diet and Physical Activity  Diet: well balanced diet  Exercise: frequently      Depression Screen  PHQ-2/9 Depression Screening    Little interest or pleasure in doing things: 0 - not at all  Feeling down, depressed, or hopeless: 0 - not at all  PHQ-2 Score: 0  PHQ-2  Interpretation: Negative depression screen          General Health  Hearing: Normal:  bilateral  Vision: no vision problems  Dental: regular dental visits    Reproductive Health  No issues  and Follows with gynecologist      The following portions of the patient's history were reviewed and updated as appropriate: allergies, current medications, past family history, past medical history, past social history, past surgical history and problem list.    Review of Systems     Review of Systems   Constitutional:  Negative for chills and fever.   HENT:  Negative for ear pain and sore throat.    Eyes:  Negative for pain and visual disturbance.   Respiratory:  Negative for cough and shortness of breath.    Cardiovascular:  Negative for chest pain and palpitations.   Gastrointestinal:  Negative for abdominal pain and vomiting.   Genitourinary:  Negative for dysuria and hematuria.   Musculoskeletal:  Negative for arthralgias and back pain.   Skin:  Negative for color change and rash.   Neurological:  Negative for seizures and syncope.   All other systems reviewed and are negative.      Past Medical History     Past Medical History:   Diagnosis Date   • Anxiety    • Cellulitis    • Chronic rhinitis     Last Assessed:6/6/2016   • Colon polyp    • GERD without esophagitis     Last Assessed:6/6/2016   • Hyperlipidemia    • Hypometabolism    • Vitamin D deficiency        Past Surgical History     Past Surgical History:   Procedure Laterality Date   • COLONOSCOPY     • [place] CRNEC TREPH BONE FLAP CRNOT EXC MENINGIOMA STTL Left 11/1/2023    Procedure: Left frontal CRANIOTOMY IMAGE-GUIDED FOR TUMOR;  Surgeon: Sigifredo Sagastume MD;  Location:  MAIN OR;  Service: Neurosurgery   • [place] NEUROPLASTY &/TRANSPOS MEDIAN NRV CARPAL TUNNE Left 3/1/2024    Procedure: RELEASE CARPAL TUNNEL;  Surgeon: Man Durant MD;  Location:  MAIN OR;  Service: Orthopedics   • [place] OPTX DSTL RADL I-ARTIC FX/EPIPHYSL SEP 3+ FRAG Left 3/1/2024     Procedure: OPEN REDUCTION W/ INTERNAL FIXATION (ORIF) RADIUS / ULNA (WRIST);  Surgeon: Man Durant MD;  [facility]: [facility];  Service: Orthopedics   • TONSILLECTOMY         Social History     Social History     Socioeconomic History   • Marital status: /Civil Union     Spouse name: None   • Number of children: None   • Years of education: None   • Highest education level: None   Occupational History   • None   Tobacco Use   • Smoking status: Former     Current packs/day: 0.00     Average packs/day: 1.5 packs/day for 30.0 years (45.1 ttl pk-yrs)     Types: Cigarettes     Start date: 6/15/1987     Quit date: 2008     Years since quittin.1     Passive exposure: Past   • Smokeless tobacco: Never   Vaping Use   • Vaping status: Never Used   Substance and Sexual Activity   • Alcohol use: Yes     Comment: social/anxiety   • Drug use: Not Currently     Types: Marijuana     Comment: Previously   • Sexual activity: None   Other Topics Concern   • None   Social History Narrative   • None     Social Drivers of Health     Financial Resource Strain: Not on file   Food Insecurity: Not on file   Transportation Needs: Not on file   Physical Activity: Not on file   Stress: Not on file   Social Connections: Not on file   Intimate Partner Violence: Not on file   Housing Stability: Not on file       Family History     Family History   Problem Relation Age of Onset   • Atrial fibrillation Mother    • Coronary artery disease Mother    • Atrial fibrillation Father    • Hypertension Father    • Breast cancer Maternal Grandmother 60       Current Medications       Current Outpatient Medications:   •  amLODIPine (NORVASC) 2.5 mg tablet, TAKE ONE TABLET DAILY, Disp: 90 tablet, Rfl: 1  •  Ascorbic Acid (VITAMIN C) 100 MG tablet, Take 100 mg by mouth daily, Disp: , Rfl:   •  atorvastatin (LIPITOR) 10 mg tablet, Take 1 tablet (10 mg total) by mouth daily, Disp: 90 tablet, Rfl: 3  •  calcium carbonate (OS-NEO) 600 MG  "tablet, Take 600 mg by mouth 2 (two) times a day with meals, Disp: , Rfl:   •  cholecalciferol (VITAMIN D3) 400 units tablet, Take 400 Units by mouth daily, Disp: , Rfl:   •  clobetasol (TEMOVATE) 0.05 % cream, Apply topically 2 (two) times a day, Disp: 45 g, Rfl: 1  •  estradiol (ESTRACE) 0.1 mg/g vaginal cream, Insert into the vagina Takes weekly on Monday evenings, Disp: , Rfl:   •  fluticasone (FLONASE) 50 mcg/act nasal spray, 1 spray into each nostril daily, Disp: 48 g, Rfl: 3  •  Multiple Vitamin (MULTIVITAMIN PO), Take by mouth, Disp: , Rfl:   •  Omega-3 Fatty Acids (Fish Oil) 300 MG CAPS, Take by mouth, Disp: , Rfl:   •  polyethylene glycol (MIRALAX) 17 g packet, Take 17 g by mouth daily as needed (constipation), Disp: , Rfl: 0     Allergies     Allergies   Allergen Reactions   • Sulfa Antibiotics Edema   • Other Rash   • Penicillins Rash   • Red Yeast Rice Extract - Food Allergy Rash       Objective     /80   Pulse 68   Temp 97.6 °F (36.4 °C)   Resp 18   Ht 5' 3\" (1.6 m)   Wt 71.7 kg (158 lb)   SpO2 98%   BMI 27.99 kg/m²      Physical Exam  Constitutional:       General: She is not in acute distress.     Appearance: She is well-developed. She is not diaphoretic.   HENT:      Head: Normocephalic and atraumatic.      Right Ear: External ear normal.      Left Ear: External ear normal.   Neck:      Thyroid: No thyromegaly.   Cardiovascular:      Rate and Rhythm: Normal rate and regular rhythm.      Heart sounds: Normal heart sounds. No murmur heard.     No friction rub. No gallop.   Pulmonary:      Effort: Pulmonary effort is normal.      Breath sounds: Normal breath sounds. No wheezing or rales.   Abdominal:      General: Bowel sounds are normal.      Palpations: Abdomen is soft. There is no mass.      Tenderness: There is no abdominal tenderness. There is no rebound.   Musculoskeletal:         General: No deformity. Normal range of motion.      Cervical back: Normal range of motion and neck " supple.   Lymphadenopathy:      Cervical: No cervical adenopathy.   Skin:     General: Skin is warm and dry.      Findings: No rash.   Neurological:      Mental Status: She is alert and oriented to person, place, and time.      Cranial Nerves: No cranial nerve deficit.      Motor: No abnormal muscle tone.      Coordination: Coordination normal.      Deep Tendon Reflexes: Reflexes are normal and symmetric. Reflexes normal.   Psychiatric:         Behavior: Behavior normal.         Thought Content: Thought content normal.         Judgment: Judgment normal.           Vision Screening    Right eye Left eye Both eyes   Without correction      With correction 20/40 20/50 20/40           Adeline Gamboa MD  [facility]

## 2025-03-05 ENCOUNTER — RESULTS FOLLOW-UP (OUTPATIENT)
Dept: CARDIOLOGY CLINIC | Facility: CLINIC | Age: 70
End: 2025-03-05

## 2025-03-05 ENCOUNTER — APPOINTMENT (OUTPATIENT)
Dept: LAB | Facility: AMBULARY SURGERY CENTER | Age: 70
End: 2025-03-05
Payer: COMMERCIAL

## 2025-03-05 ENCOUNTER — OFFICE VISIT (OUTPATIENT)
Dept: OBGYN CLINIC | Facility: CLINIC | Age: 70
End: 2025-03-05
Payer: COMMERCIAL

## 2025-03-05 ENCOUNTER — APPOINTMENT (OUTPATIENT)
Dept: RADIOLOGY | Facility: AMBULARY SURGERY CENTER | Age: 70
End: 2025-03-05
Attending: SURGERY
Payer: COMMERCIAL

## 2025-03-05 VITALS — BODY MASS INDEX: 26.8 KG/M2 | WEIGHT: 157 LBS | HEIGHT: 64 IN

## 2025-03-05 DIAGNOSIS — S52.532D CLOSED COLLES' FRACTURE OF LEFT RADIUS WITH ROUTINE HEALING, SUBSEQUENT ENCOUNTER: ICD-10-CM

## 2025-03-05 DIAGNOSIS — Z82.49 FAMILY HISTORY OF ISCHEMIC HEART DISEASE: ICD-10-CM

## 2025-03-05 DIAGNOSIS — E78.2 MIXED HYPERLIPIDEMIA: ICD-10-CM

## 2025-03-05 DIAGNOSIS — D32.9 BENIGN NEOPLASM OF MENINGES (HCC): ICD-10-CM

## 2025-03-05 DIAGNOSIS — M72.0 DUPUYTREN'S DISEASE OF PALM OF LEFT HAND: ICD-10-CM

## 2025-03-05 DIAGNOSIS — R01.1 UNDIAGNOSED CARDIAC MURMURS: ICD-10-CM

## 2025-03-05 DIAGNOSIS — I10 ESSENTIAL HYPERTENSION, MALIGNANT: Primary | ICD-10-CM

## 2025-03-05 DIAGNOSIS — S52.532D CLOSED COLLES' FRACTURE OF LEFT RADIUS WITH ROUTINE HEALING, SUBSEQUENT ENCOUNTER: Primary | ICD-10-CM

## 2025-03-05 LAB
ALBUMIN SERPL BCG-MCNC: 4 G/DL (ref 3.5–5)
ALP SERPL-CCNC: 88 U/L (ref 34–104)
ALT SERPL W P-5'-P-CCNC: 23 U/L (ref 7–52)
ANION GAP SERPL CALCULATED.3IONS-SCNC: 8 MMOL/L (ref 4–13)
AST SERPL W P-5'-P-CCNC: 24 U/L (ref 13–39)
BASOPHILS # BLD AUTO: 0.05 THOUSANDS/ÂΜL (ref 0–0.1)
BASOPHILS NFR BLD AUTO: 1 % (ref 0–1)
BILIRUB SERPL-MCNC: 0.8 MG/DL (ref 0.2–1)
BUN SERPL-MCNC: 14 MG/DL (ref 5–25)
CALCIUM SERPL-MCNC: 9.3 MG/DL (ref 8.4–10.2)
CHLORIDE SERPL-SCNC: 103 MMOL/L (ref 96–108)
CHOLEST SERPL-MCNC: 170 MG/DL (ref ?–200)
CO2 SERPL-SCNC: 28 MMOL/L (ref 21–32)
CREAT SERPL-MCNC: 0.68 MG/DL (ref 0.6–1.3)
EOSINOPHIL # BLD AUTO: 0.23 THOUSAND/ÂΜL (ref 0–0.61)
EOSINOPHIL NFR BLD AUTO: 6 % (ref 0–6)
ERYTHROCYTE [DISTWIDTH] IN BLOOD BY AUTOMATED COUNT: 12.4 % (ref 11.6–15.1)
GFR SERPL CREATININE-BSD FRML MDRD: 90 ML/MIN/1.73SQ M
GLUCOSE P FAST SERPL-MCNC: 89 MG/DL (ref 65–99)
HCT VFR BLD AUTO: 40.1 % (ref 34.8–46.1)
HDLC SERPL-MCNC: 60 MG/DL
HGB BLD-MCNC: 13.5 G/DL (ref 11.5–15.4)
IMM GRANULOCYTES # BLD AUTO: 0 THOUSAND/UL (ref 0–0.2)
IMM GRANULOCYTES NFR BLD AUTO: 0 % (ref 0–2)
LDLC SERPL CALC-MCNC: 95 MG/DL (ref 0–100)
LYMPHOCYTES # BLD AUTO: 1.16 THOUSANDS/ÂΜL (ref 0.6–4.47)
LYMPHOCYTES NFR BLD AUTO: 29 % (ref 14–44)
MCH RBC QN AUTO: 32.5 PG (ref 26.8–34.3)
MCHC RBC AUTO-ENTMCNC: 33.7 G/DL (ref 31.4–37.4)
MCV RBC AUTO: 97 FL (ref 82–98)
MONOCYTES # BLD AUTO: 0.34 THOUSAND/ÂΜL (ref 0.17–1.22)
MONOCYTES NFR BLD AUTO: 9 % (ref 4–12)
NEUTROPHILS # BLD AUTO: 2.22 THOUSANDS/ÂΜL (ref 1.85–7.62)
NEUTS SEG NFR BLD AUTO: 55 % (ref 43–75)
NONHDLC SERPL-MCNC: 110 MG/DL
NRBC BLD AUTO-RTO: 0 /100 WBCS
PLATELET # BLD AUTO: 210 THOUSANDS/UL (ref 149–390)
PMV BLD AUTO: 11.3 FL (ref 8.9–12.7)
POTASSIUM SERPL-SCNC: 4.2 MMOL/L (ref 3.5–5.3)
PROT SERPL-MCNC: 6.6 G/DL (ref 6.4–8.4)
RBC # BLD AUTO: 4.15 MILLION/UL (ref 3.81–5.12)
SODIUM SERPL-SCNC: 139 MMOL/L (ref 135–147)
TRIGL SERPL-MCNC: 75 MG/DL (ref ?–150)
TSH SERPL DL<=0.05 MIU/L-ACNC: 1.88 UIU/ML (ref 0.45–4.5)
WBC # BLD AUTO: 4 THOUSAND/UL (ref 4.31–10.16)

## 2025-03-05 PROCEDURE — 85025 COMPLETE CBC W/AUTO DIFF WBC: CPT

## 2025-03-05 PROCEDURE — 80053 COMPREHEN METABOLIC PANEL: CPT

## 2025-03-05 PROCEDURE — 80061 LIPID PANEL: CPT

## 2025-03-05 PROCEDURE — 73110 X-RAY EXAM OF WRIST: CPT

## 2025-03-05 PROCEDURE — 36415 COLL VENOUS BLD VENIPUNCTURE: CPT

## 2025-03-05 PROCEDURE — 84443 ASSAY THYROID STIM HORMONE: CPT

## 2025-03-05 PROCEDURE — 99213 OFFICE O/P EST LOW 20 MIN: CPT | Performed by: SURGERY

## 2025-03-05 NOTE — ASSESSMENT & PLAN NOTE
New xrays of the left wrist were obtained today and reviewed  On exam, she has predictable ROM for a wrist fracture, no pain over flexion of thumb. She has early Dupuytren's cords with no contracture.  Discussed with the patient that if she has aching, crunching with the thumb going into flexion, to come back in for evaluation to remove plate.   Patient shows understanding and agrees with this plan of care.  Will follow the patient as needed  Orders:    XR wrist 3+ vw left; Future

## 2025-03-05 NOTE — PROGRESS NOTES
ASSESSMENT/PLAN:      Assessment & Plan  Closed Colles' fracture of left radius with routine healing, subsequent encounter  New xrays of the left wrist were obtained today and reviewed  On exam, she has predictable ROM for a wrist fracture, no pain over flexion of thumb. She has early Dupuytren's cords with no contracture.  Discussed with the patient that if she has aching, crunching with the thumb going into flexion, to come back in for evaluation to remove plate.   Patient shows understanding and agrees with this plan of care.  Will follow the patient as needed  Orders:    XR wrist 3+ vw left; Future    Dupuytren's disease of palm of left hand  On exam, noted mild cords with no contracture of the left palm.              The patient verbalized understanding of exam findings and treatment plan. We engaged in the shared decision-making process and treatment options were discussed at length with the patient. Surgical and conservative management discussed today along with risks and benefits.    Diagnoses and all orders for this visit:    Closed Colles' fracture of left radius with routine healing, subsequent encounter  -     XR wrist 3+ vw left; Future    Dupuytren's disease of palm of left hand      Follow Up:  Return if symptoms worsen or fail to improve.      To Do Next Visit:  Re-evaluation of current issue    ____________________________________________________________________________________________________________________________________________      CHIEF COMPLAINT:  Chief Complaint   Patient presents with    Follow-up     Patient is doing well no pain at this time       SUBJECTIVE:  Maira Colby is a 69 y.o. year old RHD female who presents today for a follow up for her left wrist. Since her last visit on 9/4/2024, she has been doing well. She has had no issues.   She is 1 year s/p ORIF left distal radius and carpal tunnel release, date of surgery 3/1/2024.     I have personally reviewed all the relevant  PMH, PSH, SH, FH, Medications and allergies.     PAST MEDICAL HISTORY:  Past Medical History:   Diagnosis Date    Anxiety     Cellulitis     Chronic rhinitis     Last Assessed:2016    Colon polyp     GERD without esophagitis     Last Assessed:2016    Hyperlipidemia     Hypometabolism     Vitamin D deficiency        PAST SURGICAL HISTORY:  Past Surgical History:   Procedure Laterality Date    COLONOSCOPY      [place] CRNEC TREPH BONE FLAP CRNOT EXC MENINGIOMA STTL Left 2023    Procedure: Left frontal CRANIOTOMY IMAGE-GUIDED FOR TUMOR;  Surgeon: Sigifredo Sagastume MD;  [facility];  Service: Neurosurgery    [place] NEUROPLASTY &/TRANSPOS MEDIAN NRV CARPAL TUNNE Left 3/1/2024    Procedure: RELEASE CARPAL TUNNEL;  Surgeon: Man Durant MD;  [facility];  Service: Orthopedics    [place] OPTX DSTL RADL I-ARTIC FX/EPIPHYSL SEP 3+ FRAG Left 3/1/2024    Procedure: OPEN REDUCTION W/ INTERNAL FIXATION (ORIF) RADIUS / ULNA (WRIST);  Surgeon: Man Durant MD;  [facility];  Service: Orthopedics    TONSILLECTOMY         FAMILY HISTORY:  Family History   Problem Relation Age of Onset    Atrial fibrillation Mother     Coronary artery disease Mother     Atrial fibrillation Father     Hypertension Father     Breast cancer Maternal Grandmother 60       SOCIAL HISTORY:  Social History     Tobacco Use    Smoking status: Former     Current packs/day: 0.00     Average packs/day: 1.5 packs/day for 30.0 years (45.1 ttl pk-yrs)     Types: Cigarettes     Start date: 6/15/1987     Quit date: 2008     Years since quittin.1     Passive exposure: Past    Smokeless tobacco: Never   Vaping Use    Vaping status: Never Used   Substance Use Topics    Alcohol use: Yes     Comment: social/anxiety    Drug use: Not Currently     Types: Marijuana     Comment: Previously       MEDICATIONS:    Current Outpatient Medications:     amLODIPine (NORVASC) 2.5 mg tablet, TAKE ONE TABLET DAILY, Disp: 90 tablet,  Rfl: 1    Ascorbic Acid (VITAMIN C) 100 MG tablet, Take 100 mg by mouth daily, Disp: , Rfl:     atorvastatin (LIPITOR) 10 mg tablet, Take 1 tablet (10 mg total) by mouth daily, Disp: 90 tablet, Rfl: 3    calcium carbonate (OS-NEO) 600 MG tablet, Take 600 mg by mouth 2 (two) times a day with meals, Disp: , Rfl:     cholecalciferol (VITAMIN D3) 400 units tablet, Take 400 Units by mouth daily, Disp: , Rfl:     clobetasol (TEMOVATE) 0.05 % cream, Apply topically 2 (two) times a day, Disp: 45 g, Rfl: 1    estradiol (ESTRACE) 0.1 mg/g vaginal cream, Insert into the vagina Takes weekly on Monday evenings, Disp: , Rfl:     fluticasone (FLONASE) 50 mcg/act nasal spray, 1 spray into each nostril daily, Disp: 48 g, Rfl: 3    Multiple Vitamin (MULTIVITAMIN PO), Take by mouth, Disp: , Rfl:     Omega-3 Fatty Acids (Fish Oil) 300 MG CAPS, Take by mouth, Disp: , Rfl:     polyethylene glycol (MIRALAX) 17 g packet, Take 17 g by mouth daily as needed (constipation), Disp: , Rfl: 0    ALLERGIES:  Allergies   Allergen Reactions    Sulfa Antibiotics Edema    Other Rash    Penicillins Rash    Red Yeast Rice Extract - Food Allergy Rash       REVIEW OF SYSTEMS:  Review of Systems   Constitutional:  Negative for chills, fever and unexpected weight change.   HENT:  Negative for hearing loss, nosebleeds and sore throat.    Eyes:  Negative for pain, redness and visual disturbance.   Respiratory:  Negative for cough, shortness of breath and wheezing.    Cardiovascular:  Negative for chest pain, palpitations and leg swelling.   Gastrointestinal:  Negative for abdominal pain and nausea.   Genitourinary:  Negative for dyspareunia, dysuria and frequency.   Skin:  Negative for rash and wound.   Neurological:  Negative for dizziness, numbness and headaches.   Psychiatric/Behavioral:  Negative for decreased concentration and suicidal ideas. The patient is not nervous/anxious.        VITALS:  There were no vitals filed for this  visit.      _____________________________________________________  PHYSICAL EXAMINATION:  General: well developed and well nourished, alert, oriented times 3, and appears comfortable  Psychiatric: Normal  HEENT: Normocephalic, Atraumatic Trachea Midline, No torticollis  Pulmonary: No audible wheezing or respiratory distress   Cardiovascular: No pitting edema, 2+ radial pulse   Abdominal/GI: abdomen non tender, non distended   Skin: No masses, erythema, lacerations, fluctation, ulcerations  Neurovascular: Sensation Intact to the Median, Ulnar, Radial Nerve, Motor Intact to the Median, Ulnar, Radial Nerve, and Pulses Intact  Musculoskeletal: Normal, except as noted in detailed exam and in HPI.      MUSCULOSKELETAL EXAMINATION:    Left wrist:   Extension 70 degrees  Flexion 50 degrees  Lacking 5 degrees of supination  Full pronation   Full composite fist  Opposition intact  Sensation intact to light touch distally  Brisk capillary refill      Dupuytren's cords with no contractures over the palm  ___________________________________________________    STUDIES REVIEWED:  I have personally reviewed and interpreted  AP lateral and oblique radiographs of xrays of the left wrist 3 views which demonstrate healed distal radius with stable plate and screw fixation      LABS REVIEWED:    HgA1c:   Lab Results   Component Value Date    HGBA1C 5.7 (H) 10/18/2023     BMP:   Lab Results   Component Value Date    GLUCOSE 83 04/28/2017    CALCIUM 9.7 02/29/2024     04/28/2017    K 3.9 02/29/2024    CO2 27 02/29/2024     02/29/2024    BUN 10 02/29/2024    CREATININE 0.61 02/29/2024         PROCEDURES PERFORMED:  Procedures  No Procedures performed today    _____________________________________________________      Scribe Attestation      I,:  Linda Rao am acting as a scribe while in the presence of the attending physician.:       I,:  Man Durant MD personally performed the services described in this  documentation    as scribed in my presence.:

## 2025-04-11 DIAGNOSIS — F40.240 CLAUSTROPHOBIA: Primary | ICD-10-CM

## 2025-04-11 RX ORDER — ALPRAZOLAM 0.5 MG
0.5 TABLET ORAL
Qty: 2 TABLET | Refills: 0 | Status: SHIPPED | OUTPATIENT
Start: 2025-04-11

## 2025-04-25 DIAGNOSIS — E78.2 MIXED HYPERLIPIDEMIA: ICD-10-CM

## 2025-04-25 RX ORDER — ATORVASTATIN CALCIUM 10 MG/1
10 TABLET, FILM COATED ORAL DAILY
Qty: 90 TABLET | Refills: 1 | Status: SHIPPED | OUTPATIENT
Start: 2025-04-25

## 2025-05-12 ENCOUNTER — HOSPITAL ENCOUNTER (OUTPATIENT)
Dept: RADIOLOGY | Facility: HOSPITAL | Age: 70
Discharge: HOME | End: 2025-05-12
Payer: COMMERCIAL

## 2025-05-12 DIAGNOSIS — Z12.31 ENCOUNTER FOR SCREENING MAMMOGRAM FOR MALIGNANT NEOPLASM OF BREAST: ICD-10-CM

## 2025-05-12 PROCEDURE — 77067 SCR MAMMO BI INCL CAD: CPT

## 2025-05-12 PROCEDURE — 77063 BREAST TOMOSYNTHESIS BI: CPT

## 2025-05-14 ENCOUNTER — HOSPITAL ENCOUNTER (OUTPATIENT)
Dept: MRI IMAGING | Facility: HOSPITAL | Age: 70
Discharge: HOME | End: 2025-05-14
Attending: NEUROLOGICAL SURGERY
Payer: COMMERCIAL

## 2025-05-14 DIAGNOSIS — D32.9 MENINGIOMA (HCC): ICD-10-CM

## 2025-05-14 PROCEDURE — 70553 MRI BRAIN STEM W/O & W/DYE: CPT

## 2025-05-14 PROCEDURE — A9585 GADOBUTROL INJECTION: HCPCS | Performed by: NEUROLOGICAL SURGERY

## 2025-05-14 RX ORDER — GADOBUTROL 604.72 MG/ML
7 INJECTION INTRAVENOUS
Status: COMPLETED | OUTPATIENT
Start: 2025-05-14 | End: 2025-05-14

## 2025-05-14 RX ADMIN — GADOBUTROL 7 ML: 604.72 INJECTION INTRAVENOUS at 13:51

## 2025-05-19 DIAGNOSIS — E78.2 MIXED HYPERLIPIDEMIA: ICD-10-CM

## 2025-05-19 DIAGNOSIS — I10 PRIMARY HYPERTENSION: ICD-10-CM

## 2025-05-19 DIAGNOSIS — R01.1 CARDIAC MURMUR: ICD-10-CM

## 2025-05-19 DIAGNOSIS — D32.9 MENINGIOMA (HCC): ICD-10-CM

## 2025-05-19 DIAGNOSIS — Z82.49 FAMILY HISTORY OF HEART DISEASE: ICD-10-CM

## 2025-05-19 DIAGNOSIS — R94.39 ABNORMAL STRESS ECG WITH TREADMILL: ICD-10-CM

## 2025-05-19 RX ORDER — AMLODIPINE BESYLATE 2.5 MG/1
2.5 TABLET ORAL DAILY
Qty: 90 TABLET | Refills: 2 | OUTPATIENT
Start: 2025-05-19

## 2025-05-20 ENCOUNTER — OFFICE VISIT (OUTPATIENT)
Dept: NEUROSURGERY | Facility: CLINIC | Age: 70
End: 2025-05-20
Payer: COMMERCIAL

## 2025-05-20 VITALS
HEIGHT: 64 IN | DIASTOLIC BLOOD PRESSURE: 70 MMHG | HEART RATE: 58 BPM | BODY MASS INDEX: 26.12 KG/M2 | WEIGHT: 153 LBS | SYSTOLIC BLOOD PRESSURE: 120 MMHG | TEMPERATURE: 97.9 F | OXYGEN SATURATION: 98 %

## 2025-05-20 DIAGNOSIS — Z48.89 AFTERCARE FOLLOWING SURGERY: ICD-10-CM

## 2025-05-20 DIAGNOSIS — D32.9 MENINGIOMA (HCC): Primary | ICD-10-CM

## 2025-05-20 PROCEDURE — 99213 OFFICE O/P EST LOW 20 MIN: CPT | Performed by: NEUROLOGICAL SURGERY

## 2025-05-20 NOTE — PROGRESS NOTES
Name: Maira Colby      : 1955      MRN: [id number]  Encounter Provider: Sigifredo Sagastume MD  Encounter Date: 2025   Encounter department: [facility]  :  Assessment & Plan  Meningioma (HCC)  No sign of recurrence.  Small contrast-enhancing irregularities may be enhancing foci although it is unclear and these are stable.  I have recommended continuing to follow with yearly MRIs  Orders:    MRI brain with and without contrast; Future    Aftercare following surgery    Orders:    MRI brain with and without contrast; Future        History of Present Illness     Maira Colby is a 70 y.o. female who presents 1 yr f/u brain    2025 - c/o: Negative for all symptoms  Imaging: MRI Brain 2025  PT: No recent PT  STEF: No recent STEF  SX: Left frontal CRANIOTOMY IMAGE-GUIDED FOR TUMOR (Left: Head) x DKO 2023  AntiCoag: No AC  Smoker: Former (Cigarettes)    0/10 pain  Negative for symptoms    No headaches  No seizures  She is doing well             Review of Systems   HENT:  Negative for hearing loss and tinnitus.    Eyes:  Negative for visual disturbance.   Gastrointestinal:  Negative for nausea and vomiting.   Musculoskeletal:  Negative for gait problem.   Neurological:  Negative for dizziness, speech difficulty, weakness, numbness and headaches.        2025 - c/o: Negative for all symptoms  Imaging: MRI Brain 2025  PT: No recent PT  STEF: No recent STEF  SX: Left frontal CRANIOTOMY IMAGE-GUIDED FOR TUMOR (Left: Head) x DKO 2023  AntiCoag: No AC  Smoker: Former (Cigarettes)   Psychiatric/Behavioral:  Negative for confusion and decreased concentration.    All other systems reviewed and are negative.    I have personally reviewed the MA's review of systems and made changes as necessary.    Past Medical History   Past Medical History:   Diagnosis Date    Anxiety     Cellulitis     Chronic rhinitis     Last Assessed:2016    Colon polyp      GERD without esophagitis     Last Assessed:6/6/2016    Hyperlipidemia     Hypometabolism     Vitamin D deficiency      Past Surgical History:   Procedure Laterality Date    COLONOSCOPY      [place] CRNEC TREPH BONE FLAP CRNOT EXC MENINGIOMA STTL Left 11/1/2023    Procedure: Left frontal CRANIOTOMY IMAGE-GUIDED FOR TUMOR;  Surgeon: Sigifredo Sagastume MD;  [facility];  Service: Neurosurgery    [place] NEUROPLASTY &/TRANSPOS MEDIAN NRV CARPAL TUNNE Left 3/1/2024    Procedure: RELEASE CARPAL TUNNEL;  Surgeon: Man Durant MD;  [facility];  Service: Orthopedics    [place] OPTX DSTL RADL I-ARTIC FX/EPIPHYSL SEP 3+ FRAG Left 3/1/2024    Procedure: OPEN REDUCTION W/ INTERNAL FIXATION (ORIF) RADIUS / ULNA (WRIST);  Surgeon: Man Durant MD;  [facility];  Service: Orthopedics    TONSILLECTOMY       Family History   Problem Relation Age of Onset    Atrial fibrillation Mother     Coronary artery disease Mother     Atrial fibrillation Father     Hypertension Father     Breast cancer Maternal Grandmother 60     she reports that she quit smoking about 17 years ago. Her smoking use included cigarettes. She started smoking about 37 years ago. She has a 45.1 pack-year smoking history. She has been exposed to tobacco smoke. She has never used smokeless tobacco. She reports current alcohol use. She reports that she does not currently use drugs after having used the following drugs: Marijuana.  Current Outpatient Medications   Medication Instructions    ALPRAZolam (XANAX) 0.5 mg, Oral, 60 min pre-procedure, May repeat x1 30 minutes prior if needed.    amLODIPine (NORVASC) 2.5 mg, Oral, Daily    atorvastatin (LIPITOR) 10 mg, Oral, Daily    calcium carbonate (OS-NEO) 600 mg, 2 times daily with meals    cholecalciferol (VITAMIN D3) 400 Units, Daily    clobetasol (TEMOVATE) 0.05 % cream Topical, 2 times daily    estradiol (ESTRACE) 0.1 mg/g vaginal cream Insert into the vagina Takes weekly on Monday  "evenings    fluticasone (FLONASE) 50 mcg/act nasal spray 1 spray, Nasal, Daily    Multiple Vitamin (MULTIVITAMIN PO) Take by mouth    Omega-3 Fatty Acids (Fish Oil) 300 MG CAPS Take by mouth    polyethylene glycol (MIRALAX) 17 g, Oral, Daily PRN    vitamin C 100 mg, Daily   Allergies[1]   Objective   /70 (BP Location: Right arm, Patient Position: Sitting, Cuff Size: Adult)   Pulse 58   Temp 97.9 °F (36.6 °C) (Temporal)   Ht 5' 4\" (1.626 m)   Wt 69.4 kg (153 lb)   SpO2 98%   BMI 26.26 kg/m²     Physical Exam  Vitals and nursing note reviewed.   Constitutional:       General: She is not in acute distress.     Appearance: Normal appearance. She is not ill-appearing, toxic-appearing or diaphoretic.   HENT:      Head: Normocephalic.      Nose: Nose normal.     Eyes:      Extraocular Movements: Extraocular movements intact.      Pupils: Pupils are equal, round, and reactive to light.       Musculoskeletal:         General: No swelling, tenderness, deformity or signs of injury. Normal range of motion.      Cervical back: Normal range of motion and neck supple. No rigidity.      Right lower leg: No edema.      Left lower leg: No edema.   Lymphadenopathy:      Cervical: No cervical adenopathy.     Skin:     General: Skin is warm and dry.      Coloration: Skin is not jaundiced.      Findings: No erythema or rash.     Neurological:      General: No focal deficit present.      Mental Status: She is alert and oriented to person, place, and time.      Cranial Nerves: No cranial nerve deficit.      Sensory: No sensory deficit.      Motor: No weakness.      Coordination: Coordination normal.      Gait: Gait normal.      Deep Tendon Reflexes: Reflexes normal.     Psychiatric:         Mood and Affect: Mood normal.         Behavior: Behavior normal.         Thought Content: Thought content normal.         Judgment: Judgment normal.       Neurological Exam  Mental Status  Alert. Oriented to person, place, and " time.    Cranial Nerves  CN III, IV, VI: Extraocular movements intact bilaterally. Pupils equal round and reactive to light bilaterally.    Gait   Normal gait.      Radiology Results Review: I personally reviewed the following image studies in PACS and associated radiology reports: MRI brain. My interpretation of the radiology images/reports is: MRI of the brain is carefully reviewed.  This demonstrates small contrast-enhancing nodular regions.  These are away from the surgical site.  As far as the surgical site there are no signs of recurrence.  The small nodular areas may or may not be meningiomas.  Time will tell by continued follow-up.  In comparison to study from 23 there is no significant change..               [1]   Allergies  Allergen Reactions    Sulfa Antibiotics Edema    Other Rash    Penicillins Rash    Red Yeast Rice Extract - Food Allergy Rash

## (undated) DEVICE — CURITY STRETCH BANDAGE: Brand: CURITY

## (undated) DEVICE — BETADINE OINTMENT FOIL PACK

## (undated) DEVICE — ARM SLING: Brand: DEROYAL

## (undated) DEVICE — GOWN,SLEEVE,STERILE,W/CSR WRAP,1/P: Brand: MEDLINE

## (undated) DEVICE — 3M™ STERI-STRIP™ REINFORCED ADHESIVE SKIN CLOSURES, R1547, 1/2 IN X 4 IN (12 MM X 100 MM), 6 STRIPS/ENVELOPE: Brand: 3M™ STERI-STRIP™

## (undated) DEVICE — GLOVE INDICATOR PI UNDERGLOVE SZ 7 BLUE

## (undated) DEVICE — SUT MONOCRYL 3-0 PS-2 18 IN Y497G

## (undated) DEVICE — BLADE MINI RND TIP ONE SIDE SHARP

## (undated) DEVICE — DRILL 1.8 X 90MM AKA

## (undated) DEVICE — DRILL TWIST 2.3MM

## (undated) DEVICE — GLOVE SRG BIOGEL 8

## (undated) DEVICE — PAD GROUNDING ADULT

## (undated) DEVICE — GAUZE SPONGES,16 PLY: Brand: CURITY

## (undated) DEVICE — TELFA 1/2" X 3": Brand: TELFA

## (undated) DEVICE — MARKER REFLECTIVE RADIOPAQUE SPHERE

## (undated) DEVICE — TOOL MR8-9MH30 MR8 9CM MATCH 3MM: Brand: MIDAS REX MR8

## (undated) DEVICE — SPECIMEN CONTAINER STERILE PEEL PACK

## (undated) DEVICE — SURGICEL 4 X 8

## (undated) DEVICE — ACE WRAP 4 IN STERILE

## (undated) DEVICE — OCCLUSIVE GAUZE STRIP,3% BISMUTH TRIBROMOPHENATE IN PETROLATUM BLEND: Brand: XEROFORM

## (undated) DEVICE — MAYFIELD® DISPOSABLE ADULT SKULL PIN (PLASTIC BASE): Brand: MAYFIELD®

## (undated) DEVICE — PREP SURGICAL PURPREP 26ML

## (undated) DEVICE — PROXIMATE PLUS MD MULTI-DIRECTIONAL RELEASE SKIN STAPLERS CONTAINS 35 STAINLESS STEEL STAPLES APPROXIMATE CLOSED DIMENSIONS: 6.9MM X 3.9MM WIDE: Brand: PROXIMATE

## (undated) DEVICE — TOOL MR8-F2/7TA23 MR8 F2/7CM TAPER 2.3MM: Brand: MIDAS REX MR8

## (undated) DEVICE — SCREW CORTICAL 3.2 X 16MM: Type: IMPLANTABLE DEVICE | Site: WRIST | Status: NON-FUNCTIONAL

## (undated) DEVICE — DRAPE SHEET THREE QUARTER

## (undated) DEVICE — SCREW CORTICAL 2.3 X 24MM: Type: IMPLANTABLE DEVICE | Site: WRIST | Status: NON-FUNCTIONAL

## (undated) DEVICE — 3M™ IOBAN™ 2 ANTIMICROBIAL INCISE DRAPE 6650EZ: Brand: IOBAN™ 2

## (undated) DEVICE — INTENDED FOR TISSUE SEPARATION, AND OTHER PROCEDURES THAT REQUIRE A SHARP SURGICAL BLADE TO PUNCTURE OR CUT.: Brand: BARD-PARKER ® CARBON RIB-BACK BLADES

## (undated) DEVICE — GLOVE INDICATOR PI UNDERGLOVE SZ 6.5 BLUE

## (undated) DEVICE — SKN PREP SPNG STKS PVP PNT STR: Brand: MEDLINE INDUSTRIES, INC.

## (undated) DEVICE — ADHESIVE SKIN HIGH VISCOSITY EXOFIN 1ML

## (undated) DEVICE — WET SKIN PREP TRAY: Brand: MEDLINE INDUSTRIES, INC.

## (undated) DEVICE — DRAPE C-ARM X-RAY

## (undated) DEVICE — PADDING CAST 4 IN  COTTON STRL

## (undated) DEVICE — Device: Brand: IQ SYSTEM

## (undated) DEVICE — GLOVE SRG BIOGEL 6.5

## (undated) DEVICE — PAD CAST 4 IN COTTON NON STERILE

## (undated) DEVICE — HEMOSTATIC MATRIX SURGIFLO 8ML W/THROMBIN

## (undated) DEVICE — ANTIBACTERIAL VIOLET BRAIDED (POLYGLACTIN 910), SYNTHETIC ABSORBABLE SUTURE: Brand: COATED VICRYL

## (undated) DEVICE — SUT MONOCRYL 4-0 PS-2 18 IN Y496G

## (undated) DEVICE — SUT VICRYL 3-0 SH 27 IN J416H

## (undated) DEVICE — GLOVE INDICATOR PI UNDERGLOVE SZ 8.5 BLUE

## (undated) DEVICE — SUT MONOCRYL PLUS 4-0 PS-2 18 IN MCP496G

## (undated) DEVICE — Device

## (undated) DEVICE — TRAY FOLEY 16FR URIMETER SURESTEP

## (undated) DEVICE — TIBURON SPLIT SHEET: Brand: CONVERTORS

## (undated) DEVICE — SUT NUROLON 4-0 TF CR/8 18 IN C584D

## (undated) DEVICE — PREP PAD BNS: Brand: CONVERTORS

## (undated) DEVICE — SUT MONOCRYL 2-0 SH 27 IN Y417H

## (undated) DEVICE — LIGHT HANDLE COVER SLEEVE DISP BLUE STELLAR

## (undated) DEVICE — NEURO PATTIES 1/4 X 1/4

## (undated) DEVICE — SUT SILK 2-0 SH CR/8 18 IN C012D

## (undated) DEVICE — GLOVE SRG BIOGEL 7

## (undated) DEVICE — CUFF TOURNIQUET 18 X 4 IN QUICK CONNECT DISP 1 BLADDER

## (undated) DEVICE — DRAPE MICROSCOPE OPMI PENTERO

## (undated) DEVICE — DISPOSABLE SLIM BIPOLAR FORCEPS, NON-STICK,: Brand: SPETZLER-MALIS

## (undated) DEVICE — PACK CRANIOTOMY PBDS RF

## (undated) DEVICE — DRAPE INTESTINAL ISOLATION BAG

## (undated) DEVICE — PETRI DISH STERILE

## (undated) DEVICE — SUT PROLENE 4-0 PS-2 18 IN 8682G

## (undated) DEVICE — STERILE BETHLEHEM PLASTIC HAND: Brand: CARDINAL HEALTH

## (undated) DEVICE — DRAPE CAMERA/LASER

## (undated) DEVICE — K-WIRE 1.1 X 100MM SS
Type: IMPLANTABLE DEVICE | Site: WRIST | Status: NON-FUNCTIONAL
Removed: 2024-03-01

## (undated) DEVICE — SUT PROLENE 4-0 PS-2 18 IN 8682H

## (undated) DEVICE — INTENDED FOR TISSUE SEPARATION, AND OTHER PROCEDURES THAT REQUIRE A SHARP SURGICAL BLADE TO PUNCTURE OR CUT.: Brand: BARD-PARKER SAFETY BLADES SIZE 15, STERILE